# Patient Record
Sex: MALE | Race: WHITE | NOT HISPANIC OR LATINO | Employment: OTHER | URBAN - METROPOLITAN AREA
[De-identification: names, ages, dates, MRNs, and addresses within clinical notes are randomized per-mention and may not be internally consistent; named-entity substitution may affect disease eponyms.]

---

## 2018-06-28 ENCOUNTER — APPOINTMENT (EMERGENCY)
Dept: CT IMAGING | Facility: HOSPITAL | Age: 83
End: 2018-06-28
Payer: MEDICARE

## 2018-06-28 ENCOUNTER — HOSPITAL ENCOUNTER (EMERGENCY)
Facility: HOSPITAL | Age: 83
Discharge: HOME | End: 2018-06-28
Attending: EMERGENCY MEDICINE
Payer: MEDICARE

## 2018-06-28 VITALS
HEART RATE: 79 BPM | BODY MASS INDEX: 31.33 KG/M2 | OXYGEN SATURATION: 98 % | RESPIRATION RATE: 18 BRPM | DIASTOLIC BLOOD PRESSURE: 90 MMHG | SYSTOLIC BLOOD PRESSURE: 183 MMHG | WEIGHT: 182.54 LBS | TEMPERATURE: 97.5 F

## 2018-06-28 DIAGNOSIS — S01.91XA LACERATION OF HEAD: Primary | ICD-10-CM

## 2018-06-28 DIAGNOSIS — S09.90XA INJURY OF HEAD, INITIAL ENCOUNTER: ICD-10-CM

## 2018-06-28 DIAGNOSIS — R00.1 BRADYCARDIA: ICD-10-CM
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
## 2018-06-28 LAB
ANION GAP SERPL CALCULATED.3IONS-SCNC: 10 MMOL/L (ref 4–13)
BASOPHILS # BLD AUTO: 0.04 THOUSANDS/ΜL (ref 0–0.1)
BASOPHILS NFR BLD AUTO: 1 % (ref 0–1)
BUN SERPL-MCNC: 22 MG/DL (ref 5–25)
CALCIUM SERPL-MCNC: 9 MG/DL (ref 8.3–10.1)
CHLORIDE SERPL-SCNC: 102 MMOL/L (ref 100–108)
CO2 SERPL-SCNC: 24 MMOL/L (ref 21–32)
CREAT SERPL-MCNC: 1.73 MG/DL (ref 0.6–1.3)
EOSINOPHIL # BLD AUTO: 0.43 THOUSAND/ΜL (ref 0–0.61)
EOSINOPHIL NFR BLD AUTO: 8 % (ref 0–6)
ERYTHROCYTE [DISTWIDTH] IN BLOOD BY AUTOMATED COUNT: 13.4 % (ref 11.6–15.1)
GFR SERPL CREATININE-BSD FRML MDRD: 34 ML/MIN/1.73SQ M
GLUCOSE SERPL-MCNC: 120 MG/DL (ref 65–140)
HCT VFR BLD AUTO: 35.5 % (ref 36.5–49.3)
HGB BLD-MCNC: 12.1 G/DL (ref 12–17)
IMM GRANULOCYTES # BLD AUTO: 0.02 THOUSAND/UL (ref 0–0.2)
IMM GRANULOCYTES NFR BLD AUTO: 0 % (ref 0–2)
LYMPHOCYTES # BLD AUTO: 1.14 THOUSANDS/ΜL (ref 0.6–4.47)
LYMPHOCYTES NFR BLD AUTO: 21 % (ref 14–44)
MCH RBC QN AUTO: 32.6 PG (ref 26.8–34.3)
MCHC RBC AUTO-ENTMCNC: 34.1 G/DL (ref 31.4–37.4)
MCV RBC AUTO: 96 FL (ref 82–98)
MONOCYTES # BLD AUTO: 0.48 THOUSAND/ΜL (ref 0.17–1.22)
MONOCYTES NFR BLD AUTO: 9 % (ref 4–12)
NEUTROPHILS # BLD AUTO: 3.4 THOUSANDS/ΜL (ref 1.85–7.62)
NEUTS SEG NFR BLD AUTO: 61 % (ref 43–75)
NRBC BLD AUTO-RTO: 0 /100 WBCS
PLATELET # BLD AUTO: 197 THOUSANDS/UL (ref 149–390)
PMV BLD AUTO: 9.2 FL (ref 8.9–12.7)
POTASSIUM SERPL-SCNC: 4.8 MMOL/L (ref 3.5–5.3)
RBC # BLD AUTO: 3.71 MILLION/UL (ref 3.88–5.62)
SODIUM SERPL-SCNC: 136 MMOL/L (ref 136–145)
TROPONIN I SERPL-MCNC: <0.02 NG/ML
WBC # BLD AUTO: 5.51 THOUSAND/UL (ref 4.31–10.16)

## 2018-06-28 PROCEDURE — 85025 COMPLETE CBC W/AUTO DIFF WBC: CPT | Performed by: EMERGENCY MEDICINE

## 2018-06-28 PROCEDURE — 93005 ELECTROCARDIOGRAM TRACING: CPT

## 2018-06-28 PROCEDURE — 36415 COLL VENOUS BLD VENIPUNCTURE: CPT | Performed by: EMERGENCY MEDICINE

## 2018-06-28 PROCEDURE — 80048 BASIC METABOLIC PNL TOTAL CA: CPT | Performed by: EMERGENCY MEDICINE

## 2018-06-28 PROCEDURE — 70450 CT HEAD/BRAIN W/O DYE: CPT

## 2018-06-28 PROCEDURE — 99284 EMERGENCY DEPT VISIT MOD MDM: CPT

## 2018-06-28 PROCEDURE — 90715 TDAP VACCINE 7 YRS/> IM: CPT | Performed by: EMERGENCY MEDICINE

## 2018-06-28 PROCEDURE — 90471 IMMUNIZATION ADMIN: CPT

## 2018-06-28 PROCEDURE — 72125 CT NECK SPINE W/O DYE: CPT

## 2018-06-28 PROCEDURE — 84484 ASSAY OF TROPONIN QUANT: CPT | Performed by: EMERGENCY MEDICINE

## 2018-06-28 RX ORDER — LISINOPRIL 20 MG/1
10 TABLET ORAL DAILY
COMMUNITY
End: 2018-07-05

## 2018-06-28 RX ORDER — PANTOPRAZOLE SODIUM 40 MG/1
40 TABLET, DELAYED RELEASE ORAL DAILY
COMMUNITY
End: 2022-07-26 | Stop reason: SDUPTHER

## 2018-06-28 RX ORDER — SIMVASTATIN 40 MG
40 TABLET ORAL DAILY
COMMUNITY
End: 2022-07-26 | Stop reason: SDUPTHER

## 2018-06-28 RX ORDER — LIDOCAINE HYDROCHLORIDE AND EPINEPHRINE 10; 10 MG/ML; UG/ML
5 INJECTION, SOLUTION INFILTRATION; PERINEURAL ONCE
Status: COMPLETED | OUTPATIENT
Start: 2018-06-28 | End: 2018-06-28

## 2018-06-28 RX ORDER — GINSENG 100 MG
1 CAPSULE ORAL ONCE
Status: COMPLETED | OUTPATIENT
Start: 2018-06-28 | End: 2018-06-28

## 2018-06-28 RX ORDER — TAMSULOSIN HYDROCHLORIDE 0.4 MG/1
0.4 CAPSULE ORAL DAILY
COMMUNITY

## 2018-06-28 RX ADMIN — Medication 1 APPLICATION: at 04:53

## 2018-06-28 RX ADMIN — TETANUS TOXOID, REDUCED DIPHTHERIA TOXOID AND ACELLULAR PERTUSSIS VACCINE, ADSORBED 0.5 ML: 5; 2.5; 8; 8; 2.5 SUSPENSION INTRAMUSCULAR at 06:27

## 2018-06-28 RX ADMIN — LIDOCAINE HYDROCHLORIDE,EPINEPHRINE BITARTRATE 5 ML: 10; .01 INJECTION, SOLUTION INFILTRATION; PERINEURAL at 06:27

## 2018-06-28 RX ADMIN — BACITRACIN ZINC 1 LARGE APPLICATION: 500 OINTMENT TOPICAL at 06:28

## 2018-06-28 NOTE — ED NOTES
Patient roomed but unable to move in the computer due to registration     Betito Masterson RN  06/28/18 4990

## 2018-06-28 NOTE — ED PROVIDER NOTES
History  Chief Complaint   Patient presents with    Head Laceration     states "fell in bathroom and hit head on the sink   denies LOC takes 325 ASA daily   states "tripped trying to find the light switch while using the walker"     History of Present Illness   80 y o  male presents to the ED after fall at home  Patient affirms striking his head and denies any loss of consciousness  Patient states the fall occurred when going to the bathroom, he tripped on his walker when turning around to blank on the light  Patient currently complains of nothing other than bleeding from a laceration on his head  ROS: Patient denies any headache, abdominal pain, chest pain, extremity pain, fever/chills, nausea/vomiting, visual changes, diarrhea, dyspnea, cough, dysuria, hematuria  All other systems reviewed and are negative  PHYSICAL EXAM:   Primary Exam   A: Patent   B: Bilateral equal breath sounds   C: Pulses intact in all extremities, no active bleeding   D: No signs of gross motor or cognitive neurologic impairment     Secondary Exam   Constitutional: No acute distress  HENT: Normocephalic with right frontal laceration  Normal pharyngeal exam  No hemotympanum, raccoon eyes or Frazier sign  Eyes: No hyphema  EOMI  PERRL  Neck: No midline tenderness, supple  CV: Regular rate and rhythm, no murmur  Peripheral pulses intact  Respiratory: No traumatic findings  Lungs clear to auscultation bilaterally  Chest nontender  Abdomen: No traumatic findings  Soft, Non-tender, non-distended  Back: No vertebral tenderness, step-offs or crepitus  Skin: Normal color, warm and dry   Extremities: Non-tender, no deformities  Neuro: Awake, alert, no gross sensory or motor deficits     Medical Decision Making   66-year-old male presenting status post fall at home  Patient insists his fall is mechanical   Patient is on aspirin, no other anticoagulation or antiplatelet medications    Considering patient's age in history, will obtain CT imaging of patient's head as I cannot rule out intracranial pathology considering his obvious head trauma and his age  Considering patient's mechanism, will evaluate patient's cervical spine with CT imaging the patient has normal neurologic exam and does not have midline tenderness  As recent evidence is demonstrated nexus criteria is not a specific for elderly patients, CT imaging is appropriate  While in the room during initial assessment, patient had significant bradycardia into the upper 30s  Patient was not symptomatic during this episode  Patient was not on cardiac monitoring the time however was established shortly thereafter however patient had return to his normal baseline rate  As such in considering patient's history of prior CABG, will evaluate with cardiac evaluation  I suggested observation in the hospital considering patient was at home alone and I cannot rule out potential bradycardia was underlying cause of his potential fall, which patient has persistently declined  I discussed risks and benefits of this with the patient and will continue to monitor patient on the emergency room  Patient appears clear and competent of making medical decisions  Patient has skin flap from fall  Mild oozing from the area  Will place LET on wound, update patient's tetanus and surgically close  I have discussed that the edges appear devitalized and will likely lose some portion of this tissue  I cannot debride the edges sufficiently to allow appropiate wound closure so will use flap as biologic dressing for eventual healing by secondary intention  I have explained this to the patient, discussed continued follow up for suture removal and the management of the scar  Reassessment: no events while patient on cardiac monitoring  Again discussed my concerns with the patient  Workup otherwise unremarkable    Patient continue to decline admission to the hospital for continued cardiac monitoring  Insistent that fall was mechanical, no lightheadedness or syncope  Patient appears clear and competent of making medical decisions  Describes fall in detail  Offered alternatives and patient agreeable to return to the ER with any symptoms that we discussed of bradycardia  Ambulated without difficulty  Again went over wound care and follow up regarding laceration including suture removal   Discussed follow up regarding falls  Prior to Admission Medications   Prescriptions Last Dose Informant Patient Reported? Taking?   aspirin (ECOTRIN) 325 mg EC tablet   Yes Yes   Sig: Take 1 tablet by mouth daily   lisinopril (ZESTRIL) 20 mg tablet   Yes Yes   Sig: Take 10 mg by mouth daily   metoprolol tartrate (LOPRESSOR) 25 mg tablet  Self Yes Yes   Sig: Take 25 mg by mouth daily   pantoprazole (PROTONIX) 40 mg tablet   Yes Yes   Sig: Take 40 mg by mouth daily   simvastatin (ZOCOR) 40 mg tablet   Yes Yes   Sig: Take 40 mg by mouth daily   tamsulosin (FLOMAX) 0 4 mg   Yes Yes   Sig: Take 0 4 mg by mouth daily      Facility-Administered Medications: None       Past Medical History:   Diagnosis Date    Hyperlipidemia     Hypertension        Past Surgical History:   Procedure Laterality Date    BACK SURGERY      CARDIAC SURGERY      COLON SURGERY         History reviewed  No pertinent family history  I have reviewed and agree with the history as documented      Social History   Substance Use Topics    Smoking status: Current Some Day Smoker     Types: Cigars    Smokeless tobacco: Never Used    Alcohol use 6 0 oz/week     6 Glasses of wine, 4 Standard drinks or equivalent per week        Review of Systems    Physical Exam  Physical Exam    Vital Signs  ED Triage Vitals [06/28/18 0409]   Temperature Pulse Respirations Blood Pressure SpO2   97 5 °F (36 4 °C) 79 18 (!) 183/90 98 %      Temp Source Heart Rate Source Patient Position - Orthostatic VS BP Location FiO2 (%)   Oral Monitor Sitting Right arm --      Pain Score       4           Vitals:    06/28/18 0409   BP: (!) 183/90   Pulse: 79   Patient Position - Orthostatic VS: Sitting       Visual Acuity      ED Medications  Medications   LET gel 1 application (1 application Topical Given 6/28/18 0453)   lidocaine-epinephrine (XYLOCAINE/EPINEPHRINE) 1 %-1:100,000 injection 5 mL (5 mL Infiltration Given 6/28/18 1104)   tetanus-diphtheria-acellular pertussis (BOOSTRIX) IM injection 0 5 mL (0 5 mL Intramuscular Given 6/28/18 4047)   bacitracin topical ointment 1 large application (1 large application Topical Given 6/28/18 0628)       Diagnostic Studies  Results Reviewed     Procedure Component Value Units Date/Time    Troponin I [[id number]]  (Normal) Collected:  06/28/18 0453    Lab Status:  Final result Specimen:  Blood from Arm, Right Updated:  06/28/18 0518     Troponin I <0 02 ng/mL     Basic metabolic panel [[id number]]  (Abnormal) Collected:  06/28/18 0453    Lab Status:  Final result Specimen:  Blood from Arm, Right Updated:  06/28/18 0510     Sodium 136 mmol/L      Potassium 4 8 mmol/L      Chloride 102 mmol/L      CO2 24 mmol/L      Anion Gap 10 mmol/L      BUN 22 mg/dL      Creatinine 1 73 (H) mg/dL      Glucose 120 mg/dL      Calcium 9 0 mg/dL      eGFR 34 ml/min/1 73sq m     Narrative:         National Kidney Disease Education Program recommendations are as follows:  GFR calculation is accurate only with a steady state creatinine  Chronic Kidney disease less than 60 ml/min/1 73 sq  meters  Kidney failure less than 15 ml/min/1 73 sq  meters      CBC and differential [[id number]]  (Abnormal) Collected:  06/28/18 0453    Lab Status:  Final result Specimen:  Blood from Arm, Right Updated:  06/28/18 0459     WBC 5 51 Thousand/uL      RBC 3 71 (L) Million/uL      Hemoglobin 12 1 g/dL      Hematocrit 35 5 (L) %      MCV 96 fL      MCH 32 6 pg      MCHC 34 1 g/dL      RDW 13 4 %      MPV 9 2 fL      Platelets 631 Thousands/uL      nRBC 0 /100 WBCs Neutrophils Relative 61 %      Immat GRANS % 0 %      Lymphocytes Relative 21 %      Monocytes Relative 9 %      Eosinophils Relative 8 (H) %      Basophils Relative 1 %      Neutrophils Absolute 3 40 Thousands/µL      Immature Grans Absolute 0 02 Thousand/uL      Lymphocytes Absolute 1 14 Thousands/µL      Monocytes Absolute 0 48 Thousand/µL      Eosinophils Absolute 0 43 Thousand/µL      Basophils Absolute 0 04 Thousands/µL                  CT spine cervical without contrast   Final Result by Micki Zafar MD (06/28 5721)      No cervical spine fracture  Straightening of the normal cervical lordosis  Mild to moderate multilevel cervical degenerative changes and grade 1 anterolisthesis C3 on C4  Workstation performed: [id number]         CT head without contrast   Final Result by Micki Zafar MD (06/28 9725)      No acute intracranial process  No skull fracture  Chronic microangiopathy  Workstation performed: [id number]                    Procedures  Lac Repair  Date/Time: 7/9/2018 5:56 AM  Performed by: Camila Chaudhry  Authorized by: Camila Chaudhry   Consent: Verbal consent obtained  Risks and benefits: risks, benefits and alternatives were discussed  Consent given by: patient  Patient understanding: patient states understanding of the procedure being performed  Patient consent: the patient's understanding of the procedure matches consent given  Required items: required blood products, implants, devices, and special equipment available  Patient identity confirmed: verbally with patient  Time out: Immediately prior to procedure a "time out" was called to verify the correct patient, procedure, equipment, support staff and site/side marked as required    Body area: head/neck  Location details: scalp  Laceration length: 3 cm  Anesthesia: local infiltration    Anesthesia:  Local Anesthetic: lidocaine 1% with epinephrine and LET (lido,epi,tetracaine)  Anesthetic total: 3 mL    Wound Dehiscence:  Superficial Wound Dehiscence: simple closure      Procedure Details:  Preparation: Patient was prepped and draped in the usual sterile fashion  Irrigation solution: saline  Amount of cleaning: standard  Skin closure: 5-0 nylon  Number of sutures: 4  Technique: simple  Approximation: loose  Approximation difficulty: simple  Dressing: antibiotic ointment  Patient tolerance: Patient tolerated the procedure well with no immediate complications             Phone Contacts  ED Phone Contact    ED Course  ED Course as of Jun 28 1623   Thu Jun 28, 2018   0515 EKG demonstrates sinus rhythm with left bundle branch block  There is no old to compare against   Negative Sgarbossa's  Patient not having active chest pain  MDM  CritCare Time    Disposition  Final diagnoses:   Laceration of head   Injury of head, initial encounter   Fall, initial encounter   Bradycardia     Time reflects when diagnosis was documented in both MDM as applicable and the Disposition within this note     Time User Action Codes Description Comment    6/28/2018  6:29 AM Eugene Lemon Tricia Julian Laceration of head     6/28/2018  6:29 AM Eugene Alvarez Add [U80 43MK] Injury of head, initial encounter     6/28/2018  6:30 AM Eugene Alvarez Add [F63  KXOY] Fall, initial encounter     6/28/2018  6:30 AM Eugene Alvarez Add [R00 1] Bradycardia       ED Disposition     ED Disposition Condition Comment    Discharge  Karley Heritage discharge to home/self care      Condition at discharge: Stable        Follow-up Information     Follow up With Specialties Details Why Contact Info Additional [facility] Emergency Department Emergency Medicine In 1 week For suture removal or follow with PCP for removal  [address] [place] [id number]  [phone] [place] ED, [address] [place], [place],  Arjun Game[facility], DO Internal Medicine  Alternative follow-up for suture removal  [address] [facility] 7344 Emergency Department Emergency Medicine Go to If symptoms worsen or any lightheadedness or other symptoms  [facility] 7481 ED, [address] [place], [place], [address]          Discharge Medication List as of 6/28/2018  6:31 AM      CONTINUE these medications which have NOT CHANGED    Details   aspirin (ECOTRIN) 325 mg EC tablet Take 1 tablet by mouth daily, Historical Med      lisinopril (ZESTRIL) 20 mg tablet Take 10 mg by mouth daily, Historical Med      metoprolol tartrate (LOPRESSOR) 25 mg tablet Take 25 mg by mouth daily, Historical Med      pantoprazole (PROTONIX) 40 mg tablet Take 40 mg by mouth daily, Historical Med      simvastatin (ZOCOR) 40 mg tablet Take 40 mg by mouth daily, Historical Med      tamsulosin (FLOMAX) 0 4 mg Take 0 4 mg by mouth daily, Historical Med           No discharge procedures on file      ED Provider  Electronically Signed by           Keke Devlin MD  06/28/18 [address] [facility] Nesha Arellano MD  07/09/18 1484

## 2018-06-28 NOTE — DISCHARGE INSTRUCTIONS
Head Injury   WHAT YOU NEED TO KNOW:   A head injury is most often caused by a blow to the head  This may occur from a fall, bicycle injury, sports injury, being struck in the head, or a motor vehicle accident  DISCHARGE INSTRUCTIONS:   Call 911 or have someone else call for any of the following:   · You cannot be woken  · You have a seizure  · You stop responding to others or you faint  · You have blurry or double vision  · Your speech becomes slurred or confused  · You have arm or leg weakness, loss of feeling, or new problems with coordination  · Your pupils are larger than usual or one pupil is a different size than the other  · You have blood or clear fluid coming out of your ears or nose  Return to the emergency department if:   · You have repeated or forceful vomiting  · You feel confused  · Your headache gets worse or becomes severe  · You or someone caring for you notices that you are harder to wake than usual   Contact your healthcare provider if:   · Your symptoms last longer than 6 weeks after the injury  · You have questions or concerns about your condition or care  Medicines:   · Acetaminophen  decreases pain  Acetaminophen is available without a doctor's order  Ask how much to take and how often to take it  Follow directions  Acetaminophen can cause liver damage if not taken correctly  · Take your medicine as directed  Contact your healthcare provider if you think your medicine is not helping or if you have side effects  Tell him or her if you are allergic to any medicine  Keep a list of the medicines, vitamins, and herbs you take  Include the amounts, and when and why you take them  Bring the list or the pill bottles to follow-up visits  Carry your medicine list with you in case of an emergency  Self-care:   · Rest  or do quiet activities for 24 to 48 hours  Limit your time watching TV, using the computer, or doing tasks that require a lot of thinking  Slowly return to your normal activities as directed  Do not play sports or do activities that may cause you to get hit in the head  Ask your healthcare provider when you can return to sports  · Apply ice  on your head for 15 to 20 minutes every hour or as directed  Use an ice pack, or put crushed ice in a plastic bag  Cover it with a towel before you apply it to your skin  Ice helps prevent tissue damage and decreases swelling and pain  · Have someone stay with you for 24 hours  or as directed  This person can monitor you for complications and call [phone]  When you are awake the person should ask you a few questions to see if you are thinking clearly  An example would be to ask your name or your address  Prevent another head injury:   · Wear a helmet that fits properly  Do this when you play sports, or ride a bike, scooter, or skateboard  Helmets help decrease your risk of a serious head injury  Talk to your healthcare provider about other ways you can protect yourself if you play sports  · Wear your seat belt every time you are in a car  This helps to decrease your risk for a head injury if you are in a car accident  Follow up with your healthcare provider as directed:  Write down your questions so you remember to ask them during your visits  © 2017 [address] [employer] Information is for End User's use only and may not be sold, redistributed or otherwise used for commercial purposes  All illustrations and images included in CareNotes® are the copyrighted property of [employer] M [employer]  or [employer]  The above information is an  only  It is not intended as medical advice for individual conditions or treatments  Talk to your doctor, nurse or pharmacist before following any medical regimen to see if it is safe and effective for you  Laceration   WHAT YOU NEED TO KNOW:   A laceration is an injury to the skin and the soft tissue underneath it   Lacerations happen when you are cut or hit by something  They can happen anywhere on the body  DISCHARGE INSTRUCTIONS:   Return to the emergency department if:   · You have heavy bleeding or bleeding that does not stop after 10 minutes of holding firm, direct pressure over the wound  · Your wound opens up  Contact your healthcare provider if:   · You have a fever or chills  · Your laceration is red, warm, or swollen  · You have red streaks on your skin coming from your wound  · You have white or yellow drainage from the wound that smells bad  · You have pain that gets worse, even after treatment  · You have questions or concerns about your condition or care  Medicines:   · Prescription pain medicine  may be given  Ask how to take this medicine safely  · Antibiotics  help treat or prevent a bacterial infection  · Take your medicine as directed  Contact your healthcare provider if you think your medicine is not helping or if you have side effects  Tell him or her if you are allergic to any medicine  Keep a list of the medicines, vitamins, and herbs you take  Include the amounts, and when and why you take them  Bring the list or the pill bottles to follow-up visits  Carry your medicine list with you in case of an emergency  Care for your wound as directed:   · Do not get your wound wet  until your healthcare provider says it is okay  Do not soak your wound in water  Do not go swimming until your healthcare provider says it is okay  Carefully wash the wound with soap and water  Gently pat the area dry or allow it to air dry  · Change your bandages  when they get wet, dirty, or after washing  Apply new, clean bandages as directed  Do not apply elastic bandages or tape too tight  Do not put powders or lotions over your incision  · Apply antibiotic ointment as directed  Your healthcare provider may give you antibiotic ointment to put over your wound if you have stitches   If you have strips of tape over your incision, let them dry up and fall off on their own  If they do not fall off within 14 days, gently remove them  If you have glue over your wound, do not remove or pick at it  If your glue comes off, do not replace it with glue that you have at home  · Check your wound every day for signs of infection such as swelling, redness, or pus  Self-care:   · Apply ice  on your wound for 15 to 20 minutes every hour or as directed  Use an ice pack, or put crushed ice in a plastic bag  Cover it with a towel  Ice helps prevent tissue damage and decreases swelling and pain  · Use a splint as directed  A splint will decrease movement and stress on your wound  It may help it heal faster  A splint may be used for lacerations over joints or areas of your body that bend  Ask your healthcare provider how to apply and remove a splint  · Decrease scarring of your wound  by applying ointments as directed  Do not apply ointments until your healthcare provider says it is okay  You may need to wait until your wound is healed  Ask which ointment to buy and how often to use it  After your wound is healed, use sunscreen over the area when you are out in the sun  You should do this for at least 6 months to 1 year after your injury  Follow up with your healthcare provider as directed: You may need to follow up in 24 to 48 hours to have your wound checked for infection  You will need to return in 3 to 14 days if you have stitches or staples so they can be removed  Care for your wound as directed to prevent infection and help it heal  Write down your questions so you remember to ask them during your visits  © 2017 [employer] Information is for End User's use only and may not be sold, redistributed or otherwise used for commercial purposes  All illustrations and images included in CareNotes® are the copyrighted property of [employer]  or [employer]  The above information is an  only  It is not intended as medical advice for individual conditions or treatments  Talk to your doctor, nurse or pharmacist before following any medical regimen to see if it is safe and effective for you

## 2018-06-29 LAB
ATRIAL RATE: 60 BPM
P AXIS: -35 DEGREES
PR INTERVAL: 174 MS
QRS AXIS: -4 DEGREES
QRSD INTERVAL: 162 MS
QT INTERVAL: 440 MS
QTC INTERVAL: 464 MS
T WAVE AXIS: 101 DEGREES
VENTRICULAR RATE: 67 BPM

## 2018-06-29 PROCEDURE — 93010 ELECTROCARDIOGRAM REPORT: CPT | Performed by: INTERNAL MEDICINE

## 2018-07-03 RX ORDER — FLUOROURACIL 50 MG/G
CREAM TOPICAL 2 TIMES DAILY
COMMUNITY
Start: 2016-12-12 | End: 2020-01-06 | Stop reason: CLARIF

## 2018-07-03 RX ORDER — RIBOFLAVIN (VITAMIN B2) 100 MG
TABLET ORAL AS NEEDED
COMMUNITY
End: 2020-01-06 | Stop reason: CLARIF

## 2018-07-05 ENCOUNTER — OFFICE VISIT (OUTPATIENT)
Dept: INTERNAL MEDICINE CLINIC | Facility: CLINIC | Age: 83
End: 2018-07-05
Payer: MEDICARE

## 2018-07-05 VITALS
DIASTOLIC BLOOD PRESSURE: 80 MMHG | OXYGEN SATURATION: 96 % | BODY MASS INDEX: 31 KG/M2 | HEART RATE: 76 BPM | HEIGHT: 64 IN | WEIGHT: 181.6 LBS | SYSTOLIC BLOOD PRESSURE: 130 MMHG

## 2018-07-05 DIAGNOSIS — S01.01XD LACERATION OF SCALP, SUBSEQUENT ENCOUNTER: Primary | ICD-10-CM

## 2018-07-05 DIAGNOSIS — Z13.31 DEPRESSION SCREENING NEGATIVE: ICD-10-CM

## 2018-07-05 PROCEDURE — 99213 OFFICE O/P EST LOW 20 MIN: CPT | Performed by: INTERNAL MEDICINE

## 2018-07-05 RX ORDER — LISINOPRIL 10 MG/1
10 TABLET ORAL DAILY
COMMUNITY
Start: 2018-05-30 | End: 2021-06-24 | Stop reason: SINTOL

## 2018-07-05 NOTE — PROGRESS NOTES
INTERNAL MEDICINE FOLLOW-UP OFFICE VISIT  [facility]    NAME: Oneta Boas  AGE: 80 y o  SEX: male  : 1927     DATE: 2018     Assessment and Plan:     1  Laceration of scalp, subsequent encounter    Wound is healing well  Continue use of antibiotic ointment  Sutures were removed without complication in the office today  Discussed with patient to follow-up if develops any new or worsening symptoms  Chief Complaint:     Chief Complaint   Patient presents with    Suture / Staple Removal     right forehead      History of Present Illness:     Patient presents for ER follow-up for suture removal  Sustained right scalp laceration after mechanical fall  He had 4 sutures placed and was ultimately discharged from the ER and told to follow-up for suture removal  He still follows mainly with PCP in [address] [place] [address]  He may eventually switch to our practice fully  No LOC  No headaches, nausea, vomiting, difficulty sleeping  Feels embarrassed that he fell  The following portions of the patient's history were reviewed and updated as appropriate: allergies, current medications, past family history, past medical history, past social history, past surgical history and problem list      Review of Systems:     Review of Systems   Constitutional: Negative for diaphoresis, fatigue and fever  Respiratory: Negative for shortness of breath  Cardiovascular: Negative for chest pain, palpitations and leg swelling  Gastrointestinal: Negative for abdominal distention, abdominal pain, constipation, diarrhea and nausea  Skin: Positive for wound  Negative for rash  Neurological: Negative for dizziness, seizures, weakness, light-headedness and headaches  Psychiatric/Behavioral: Negative for agitation, sleep disturbance and suicidal ideas  The patient is not nervous/anxious         Problem List:     Patient Active Problem List   Diagnosis    Hypertension    Hyperlipidemia  CAD (coronary artery disease)    Benign prostatic hyperplasia    Actinic keratosis      Objective:     /80   Pulse 76   Ht 5' 3 75" (1 619 m)   Wt 82 4 kg (181 lb 9 6 oz)   SpO2 96%   BMI 31 42 kg/m²       Physical Exam   Constitutional: He appears well-developed and well-nourished  No distress  Cardiovascular: Normal rate and regular rhythm  Pulmonary/Chest: Effort normal and breath sounds normal    Abdominal: Soft  Bowel sounds are normal  There is no tenderness  Skin: Skin is warm and dry  No rash noted  He is not diaphoretic  No erythema  Right scalp healing wound without any surrounding erythema or purulent drainage     Suture removal  Date/Time: 7/5/2018 2:18 PM  Performed by: Domenica Gibson  Authorized by: Domenica Gibson     Consent:     Consent obtained:  Verbal    Consent given by:  Patient    Risks discussed:  Pain and wound separation    Alternatives discussed:  No treatment  Universal protocol:     Procedure explained and questions answered to patient or proxy's satisfaction: yes      Patient identity confirmed:  Verbally with patient  Location:     Laterality:  Right    Location: Scalp  Procedure details: Tools used:  Suture removal kit    Wound appearance:  No sign(s) of infection, good wound healing and clean    Number of sutures removed:  4  Post-procedure details:     Post-removal:  Antibiotic ointment applied    Patient tolerance of procedure: Tolerated well, no immediate complications      Pertinent Laboratory/Diagnostic Studies:    Laboratory Results: I have personally reviewed the pertinent laboratory results/reports     Radiology/Other Diagnostic Testing Results: I have personally reviewed pertinent reports  PHQ-9  Negative for depression with PHQ2 score of 0  Fall Risk  The patient has a history of falls  A risk assessment for falls was completed  A plan of care for falls was documented       Current Medications:     Outpatient Medications Prior to Visit   Medication Sig Dispense Refill    aspirin (ECOTRIN) 325 mg EC tablet Take 1 tablet by mouth daily      metoprolol tartrate (LOPRESSOR) 25 mg tablet Take 25 mg by mouth daily      pantoprazole (PROTONIX) 40 mg tablet Take 40 mg by mouth daily      simvastatin (ZOCOR) 40 mg tablet Take 40 mg by mouth daily      tamsulosin (FLOMAX) 0 4 mg Take 0 4 mg by mouth daily      lisinopril (ZESTRIL) 20 mg tablet Take 10 mg by mouth daily       No facility-administered medications prior to visit          Adelia Winston DO  [facility]

## 2018-07-05 NOTE — PATIENT INSTRUCTIONS
Fall Prevention   AMBULATORY CARE:   Fall prevention  includes ways to make your home and other areas safer  It also includes ways you can move more carefully to prevent a fall  Health conditions that cause changes in your blood pressure, vision, or muscle strength and coordination may increase your risk for falls  Medicines may also increase your risk for falls if they make you dizzy, weak, or sleepy  Call 911 or have someone else call if:   · You have fallen and are unconscious  · You have fallen and cannot move part of your body  Contact your healthcare provider if:   · You have fallen and have pain or a headache  · You have questions or concerns about your condition or care  Fall prevention tips:   · Stand or sit up slowly  This may help you keep your balance and prevent falls  · Use assistive devices as directed  Your healthcare provider may suggest that you use a cane or walker to help you keep your balance  You may need to have grab bars put in your bathroom near the toilet or in the shower  · Wear shoes that fit well and have soles that   Wear shoes both inside and outside  Use slippers with good   Do not wear shoes with high heels  · Wear a personal alarm  This is a device that allows you to call 911 if you fall and need help  Ask your healthcare provider for more information  · Stay active  Exercise can help strengthen your muscles and improve your balance  Your healthcare provider may recommend water aerobics or walking  He or she may also recommend physical therapy to improve your coordination  Never start an exercise program without talking to your healthcare provider first      · Manage your medical conditions  Keep all appointments with your healthcare providers  Visit your eye doctor as directed  Home safety tips:   · Add items to prevent falls in the bathroom  Put nonslip strips on your bath or shower floor to prevent you from slipping   Use a bath mat if you do not have carpet in the bathroom  This will prevent you from falling when you step out of the bath or shower  Use a shower seat so you do not need to stand while you shower  Sit on the toilet or a chair in your bathroom to dry yourself and put on clothing  This will prevent you from losing your balance from drying or dressing yourself while you are standing  · Keep paths clear  Remove books, shoes, and other objects from walkways and stairs  Place cords for telephones and lamps out of the way so that you do not need to walk over them  Tape them down if you cannot move them  Remove small rugs  If you cannot remove a rug, secure it with double-sided tape  This will prevent you from tripping  · Install bright lights in your home  Use night lights to help light paths to the bathroom or kitchen  Always turn on the light before you start walking  · Keep items you use often on shelves within reach  Do not use a step stool to help you reach an item  · Paint or place reflective tape on the edges of your stairs  This will help you see the stairs better  Follow up with your healthcare provider as directed:  Write down your questions so you remember to ask them during your visits  © 2017 [employer] Information is for End User's use only and may not be sold, redistributed or otherwise used for commercial purposes  All illustrations and images included in CareNotes® are the copyrighted property of [employer]  or [employer]  The above information is an  only  It is not intended as medical advice for individual conditions or treatments  Talk to your doctor, nurse or pharmacist before following any medical regimen to see if it is safe and effective for you

## 2018-10-18 RX ORDER — CIPROFLOXACIN 250 MG/1
250 TABLET, FILM COATED ORAL 2 TIMES DAILY
Refills: 0 | COMMUNITY
Start: 2018-09-28 | End: 2018-10-19 | Stop reason: CLARIF

## 2018-10-18 RX ORDER — NEOMYCIN SULFATE, POLYMYXIN B SULFATE AND HYDROCORTISONE 10; 3.5; 1 MG/ML; MG/ML; [USP'U]/ML
SUSPENSION/ DROPS AURICULAR (OTIC)
Refills: 2 | COMMUNITY
Start: 2018-09-28 | End: 2019-10-17 | Stop reason: CLARIF

## 2018-10-19 ENCOUNTER — OFFICE VISIT (OUTPATIENT)
Dept: INTERNAL MEDICINE CLINIC | Facility: CLINIC | Age: 83
End: 2018-10-19
Payer: MEDICARE

## 2018-10-19 VITALS
BODY MASS INDEX: 30.97 KG/M2 | WEIGHT: 181.4 LBS | DIASTOLIC BLOOD PRESSURE: 78 MMHG | HEIGHT: 64 IN | SYSTOLIC BLOOD PRESSURE: 138 MMHG | HEART RATE: 77 BPM

## 2018-10-19 DIAGNOSIS — M19.032 PRIMARY OSTEOARTHRITIS OF LEFT WRIST: Primary | ICD-10-CM

## 2018-10-19 DIAGNOSIS — Z23 ENCOUNTER FOR IMMUNIZATION: ICD-10-CM

## 2018-10-19 PROCEDURE — G0009 ADMIN PNEUMOCOCCAL VACCINE: HCPCS | Performed by: INTERNAL MEDICINE

## 2018-10-19 PROCEDURE — 90662 IIV NO PRSV INCREASED AG IM: CPT | Performed by: INTERNAL MEDICINE

## 2018-10-19 PROCEDURE — 99213 OFFICE O/P EST LOW 20 MIN: CPT | Performed by: INTERNAL MEDICINE

## 2018-10-19 PROCEDURE — 90670 PCV13 VACCINE IM: CPT | Performed by: INTERNAL MEDICINE

## 2018-10-19 PROCEDURE — G0008 ADMIN INFLUENZA VIRUS VAC: HCPCS | Performed by: INTERNAL MEDICINE

## 2018-10-19 RX ORDER — METOPROLOL SUCCINATE 25 MG
25 TABLET, EXTENDED RELEASE 24 HR ORAL DAILY
COMMUNITY
Start: 2018-09-01 | End: 2022-04-19 | Stop reason: SDUPTHER

## 2018-10-19 NOTE — PATIENT INSTRUCTIONS
Carpal Tunnel Surgery   WHAT YOU NEED TO KNOW:   What do I need to know about carpal tunnel surgery? Carpal tunnel surgery, or decompression, is used to take pressure off the median nerve in your wrist  The median nerve controls muscles and feeling in the hand  Surgery may be done through an opening on your palm  This is called open surgery  Your surgeon may instead put a scope and tools into 1 or 2 small incisions on your wrist or palm  This is called endoscopic surgery  How do I prepare for surgery? Your healthcare provider will tell you how to prepare for surgery  He may tell you not to eat or drink anything after midnight on the day of your surgery  He will tell you which medicines to take or not take on the day of surgery  Arrange to have someone drive you home after surgery  You may get antibiotics before surgery to prevent an infection  Tell your healthcare provider if you have ever had an allergic reaction to antibiotics  What will happen during surgery? · You may be given local or regional anesthesia to help prevent pain during surgery  Local anesthesia will make only your wrist numb  Regional anesthesia will make your wrist, hand, and arm numb  You may instead be given general anesthesia to keep you asleep and free from pain  You may need this anesthesia if your surgeon thinks surgery will take a long time or involve a large part of your wrist      · For open surgery, your surgeon will make an incision on the palm of your hand  The incision may extend to your wrist  A ligament will be cut to release the pressure on the nerve  This ligament is a band of tissue that connects joints in your wrist  Your surgeon may also remove scar tissue or anything else that may be pressing on the nerve  · For endoscopic surgery, your surgeon will make 1 or 2 incisions on your wrist or palm  He will insert the endoscope with the camera through an incision to help guide him during surgery   Tools may be put in your wrist to help protect the nerves  He will then cut the ligament that is pressing on the nerve  · The incision will be closed with stitches and covered with bandages  What will happen after surgery? A splint may be placed on your wrist to keep it from moving  You will be taken to a room where you will rest until you are fully awake and gain feeling in your arm  Your healthcare provider may ask you to move your fingers soon after your surgery  Do not try to get out of bed until your healthcare provider says it is okay  What are the risks of carpal tunnel surgery? You may bleed more than expected or get an infection  Your skin may bruise  A thick, painful scar may form where you had surgery  You may develop trigger finger (fingers locked in a bent position)  Surgery may cause long-term numbness or weakness in your fingers, hand, or wrist  Your symptoms may not go away, and you may need surgery again  CARE AGREEMENT:   You have the right to help plan your care  Learn about your health condition and how it may be treated  Discuss treatment options with your caregivers to decide what care you want to receive  You always have the right to refuse treatment  The above information is an  only  It is not intended as medical advice for individual conditions or treatments  Talk to your doctor, nurse or pharmacist before following any medical regimen to see if it is safe and effective for you  © 2017 [employer] Information is for End User's use only and may not be sold, redistributed or otherwise used for commercial purposes  All illustrations and images included in CareNotes® are the copyrighted property of [employer]  or [employer]

## 2018-10-19 NOTE — PROGRESS NOTES
INTERNAL MEDICINE FOLLOW-UP OFFICE VISIT  [facility]    NAME: Alissa Reyes  AGE: 80 y o  SEX: male  : 1927     DATE: 10/19/2018     Assessment and Plan:     1  Primary osteoarthritis of left wrist    Patient was reassured that this is likely arthritis pain he is experiencing  He states the pain is mild and that he can tolerate it  He will work on positioning himself better when he goes to sleep  No further testing indicated  2  Encounter for immunization  - PNEUMOCOCCAL CONJUGATE VACCINE 13-VALENT GREATER THAN 6 MONTHS  - influenza vaccine, [id number], high-dose, PF 0 5 mL, for patients 65 yr+ (FLUZONE HIGH-DOSE)    Return in about 4 months (around 2019) for AWV, Follow-up  Chief Complaint:     Chief Complaint   Patient presents with    Hand Pain     at night      History of Present Illness:     Gets intermittent pain in hands at times  Only bothers him at night though  He is a side sleeper  He will get pain more around digits 1-3  He denies any chest pain, shortness of breath, palpitations, diaphoresis  He denies any loss of  strength  Doesn't feel any numbness  More of a dull pain with some sharp moments  Has some arthritis topical ointment he uses at that takes it away within 30 minutes  The following portions of the patient's history were reviewed and updated as appropriate: allergies, current medications, past family history, past medical history, past social history, past surgical history and problem list      Review of Systems:     Review of Systems   Respiratory: Negative  Cardiovascular: Negative  Musculoskeletal: Positive for arthralgias  Neurological: Negative for weakness and numbness        Problem List:     Patient Active Problem List   Diagnosis    Hypertension    Hyperlipidemia    CAD (coronary artery disease)    Benign prostatic hyperplasia    Actinic keratosis      Objective:     /78 (BP Location: Left arm, Patient Position: Sitting, Cuff Size: Standard)   Pulse 77   Ht 5' 3 75" (1 619 m)   Wt 82 3 kg (181 lb 6 4 oz)   BMI 31 38 kg/m²     Physical Exam   Constitutional: He appears well-developed and well-nourished  No distress  Cardiovascular: Normal rate and regular rhythm  No murmur heard  Pulmonary/Chest: Effort normal and breath sounds normal  No respiratory distress  Musculoskeletal: He exhibits deformity (Arthritic changes of left hand; reduced ROM; negative Tinel sign; negative Phalen's )  Skin: He is not diaphoretic         Susie DO Issa  [facility]

## 2018-11-29 ENCOUNTER — TELEPHONE (OUTPATIENT)
Dept: INTERNAL MEDICINE CLINIC | Facility: CLINIC | Age: 83
End: 2018-11-29

## 2019-10-16 ENCOUNTER — OFFICE VISIT (OUTPATIENT)
Dept: DERMATOLOGY | Facility: CLINIC | Age: 84
End: 2019-10-16
Payer: MEDICARE

## 2019-10-16 DIAGNOSIS — L82.1 SEBORRHEIC KERATOSIS: ICD-10-CM

## 2019-10-16 DIAGNOSIS — L57.0 ACTINIC KERATOSIS: Primary | ICD-10-CM

## 2019-10-16 DIAGNOSIS — Z13.89 SCREENING FOR SKIN CONDITION: ICD-10-CM

## 2019-10-16 PROCEDURE — 99203 OFFICE O/P NEW LOW 30 MIN: CPT | Performed by: DERMATOLOGY

## 2019-10-16 NOTE — PROGRESS NOTES
[address]  [address] [facility] [address]  [phone]  [phone]     MRN: [id number] : 1927  Encounter: [id number]  Patient Information: Patrick Lowe  Chief complaint:  Concerned regarding rash on arms    History of present illness:  80-year-old male presents secondary to concerns with a rash on his arms that he states has only been present recently  Patient has listed in his chart use of Efudex which he received in  but he does not recall using that medication  Patient does not know if he saw a dermatologist previously  Patient last seen by Dr Ernesto Graves a year ago   Patient was seen by cardiologist last in May and has not been seen by any other Dr   Past Medical History:   Diagnosis Date    Esophageal reflux     Hyperlipidemia     Hypertension      Past Surgical History:   Procedure Laterality Date    BACK SURGERY      CARDIAC SURGERY      COLON SURGERY      CORONARY ARTERY BYPASS GRAFT      TONSILLECTOMY      Last Assessed: 2016     Social History   Social History     Substance and Sexual Activity   Alcohol Use Yes    Alcohol/week: 10 0 standard drinks    Types: 6 Glasses of wine, 4 Standard drinks or equivalent per week    Frequency: Monthly or less    Drinks per session: 1 or 2    Binge frequency: Never    Comment: occasionally     Social History     Substance and Sexual Activity   Drug Use No     Social History     Tobacco Use   Smoking Status Current Some Day Smoker    Packs/day: 0 00    Types: Cigars   Smokeless Tobacco Never Used     Family History   Problem Relation Age of Onset    Cancer Mother     Stroke Father      Meds/Allergies   Allergies   Allergen Reactions    Penicillins Rash     Rash on hands    Morphine Other (See Comments)     Low blood pressure       Meds:  Prior to Admission medications    Medication Sig Start Date End Date Taking?  Authorizing Provider   Ascorbic Acid (VITAMIN C) 100 MG tablet Take by mouth as needed     Yes Historical Provider, MD   aspirin (ECOTRIN) 325 mg EC tablet Take 1 tablet by mouth daily   Yes Historical Provider, MD   lisinopril (ZESTRIL) 10 mg tablet Take 10 mg by mouth daily 5/30/18  Yes Historical Provider, MD   pantoprazole (PROTONIX) 40 mg tablet Take 40 mg by mouth daily   Yes Historical Provider, MD   simvastatin (ZOCOR) 40 mg tablet Take 40 mg by mouth daily   Yes Historical Provider, MD   TOPROL XL 25 MG 24 hr tablet  9/1/18  Yes Historical Provider, MD   fluorouracil (EFUDEX) 5 % cream Apply topically 2 (two) times a day 12/12/16   Historical Provider, MD   neomycin-polymyxin-hydrocortisone (CORTISPORIN) 0 35%-10,000 units/mL-1% otic suspension PUT 5 DROPS IN LEFT EAR TWICE A DAY AS NEEDED 9/28/18   Historical Provider, MD   Polyethylene Glycol 3350 (MIRALAX PO) Take by mouth as needed      Historical Provider, MD   tamsulosin (FLOMAX) 0 4 mg Take 0 4 mg by mouth daily    Historical Provider, MD       Subjective:     Review of Systems:    General: negative for - chills, fatigue, fever,  weight gain or weight loss  Psychological: negative for - anxiety, behavioral disorder, concentration difficulties, decreased libido, depression, irritability, memory difficulties, mood swings, sleep disturbances or suicidal ideation  ENT: negative for - hearing difficulties , nasal congestion, nasal discharge, oral lesions, sinus pain, sneezing, sore throat  Allergy and Immunology: negative for - hives, insect bite sensitivity,  Hematological and Lymphatic: negative for - bleeding problems, blood clots,bruising, swollen lymph nodes  Endocrine: negative for - hair pattern changes, hot flashes, malaise/lethargy, mood swings, palpitations, polydipsia/polyuria, skin changes, temperature intolerance or unexpected weight change  Respiratory: negative for - cough, hemoptysis, orthopnea, shortness of breath, or wheezing  Cardiovascular: negative for - chest pain, dyspnea on exertion, edema,  Gastrointestinal: negative for - abdominal pain, nausea/vomiting  Genito-Urinary: negative for - dysuria, incontinence, irregular/heavy menses or urinary frequency/urgency  Musculoskeletal: negative for - gait disturbance, joint pain, joint stiffness, joint swelling, muscle pain, muscular weakness  Dermatological:  As in HPI  Neurological: negative for confusion, dizziness, headaches, impaired coordination/balance, memory loss, numbness/tingling, seizures, speech problems, tremors or weakness       Objective: There were no vitals taken for this visit  Physical Exam:    General Appearance:    Alert, cooperative, no distress   Head:    Normocephalic, without obvious abnormality, atraumatic           Skin:   A full skin exam was performed including scalp, head scalp, eyes, ears, nose, lips, neck, chest, axilla, abdomen, back, buttocks, bilateral upper extremities, bilateral lower extremities, hands, feet, fingers, toes, fingernails, and toenails wide areas of scaling erythema on the dorsum of the arms also on the scalp     Assessment:     1  Actinic keratosis     2  Seborrheic keratosis     3  Screening for skin condition           Plan:   Actinic keratosis quite extensive patient however really seems somewhat confused and would difficult to delineate if what he has treated with this process previously at present would hold off on any intervention until he gets further evaluated will treat with topical steroid for now just a control irritation and re-evaluate again in a month  Seborrheic Keratosis  Patient reasurred these are normal growths we acquire with age no treatment needed    Screening for Dermatologic Disorders: Nothing else of concern noted on complete exam follow up in 1 month      Meet Goss MD  10/16/2019,10:47 AM    Portions of the record may have been created with voice recognition software   Occasional wrong word or "sound a like" substitutions may have occurred due to the inherent limitations of voice recognition software   Read the chart carefully and recognize, using context, where substitutions have occurred

## 2019-10-16 NOTE — PATIENT INSTRUCTIONS
Actinic keratosis quite extensive patient however really seems somewhat confused and would difficult to delineate if what he has treated with this process previously at present would hold off on any intervention until he gets further evaluated will treat with topical steroid for now just a control irritation and re-evaluate again in a month  Seborrheic Keratosis  Patient reasurred these are normal growths we acquire with age no treatment needed    Screening for Dermatologic Disorders: Nothing else of concern noted on complete exam follow up in 1 month

## 2019-10-17 ENCOUNTER — OFFICE VISIT (OUTPATIENT)
Dept: INTERNAL MEDICINE CLINIC | Facility: CLINIC | Age: 84
End: 2019-10-17
Payer: MEDICARE

## 2019-10-17 VITALS
WEIGHT: 181.4 LBS | OXYGEN SATURATION: 98 % | DIASTOLIC BLOOD PRESSURE: 70 MMHG | HEART RATE: 82 BPM | BODY MASS INDEX: 30.97 KG/M2 | HEIGHT: 64 IN | SYSTOLIC BLOOD PRESSURE: 130 MMHG

## 2019-10-17 DIAGNOSIS — M19.042 PRIMARY OSTEOARTHRITIS OF BOTH HANDS: Primary | ICD-10-CM

## 2019-10-17 DIAGNOSIS — M19.041 PRIMARY OSTEOARTHRITIS OF BOTH HANDS: Primary | ICD-10-CM

## 2019-10-17 DIAGNOSIS — I25.10 CORONARY ARTERY DISEASE INVOLVING NATIVE CORONARY ARTERY OF NATIVE HEART WITHOUT ANGINA PECTORIS: ICD-10-CM

## 2019-10-17 DIAGNOSIS — I10 ESSENTIAL HYPERTENSION: ICD-10-CM

## 2019-10-17 DIAGNOSIS — G56.01 CARPAL TUNNEL SYNDROME OF RIGHT WRIST: ICD-10-CM

## 2019-10-17 DIAGNOSIS — E78.2 MIXED HYPERLIPIDEMIA: ICD-10-CM

## 2019-10-17 DIAGNOSIS — Z00.00 MEDICARE ANNUAL WELLNESS VISIT, SUBSEQUENT: ICD-10-CM

## 2019-10-17 PROCEDURE — 99214 OFFICE O/P EST MOD 30 MIN: CPT | Performed by: INTERNAL MEDICINE

## 2019-10-17 PROCEDURE — G0439 PPPS, SUBSEQ VISIT: HCPCS | Performed by: INTERNAL MEDICINE

## 2019-10-17 NOTE — PROGRESS NOTES
INTERNAL MEDICINE FOLLOW-UP OFFICE VISIT  [facility] - [facility]    NAME: Lisa Patton  AGE: 80 y o  SEX: male  : 1927     DATE: 10/17/2019     Assessment and Plan:     1  Primary osteoarthritis of both hands  2  Carpal tunnel syndrome of right wrist  3  Essential hypertension  4  Mixed hyperlipidemia  5  Coronary artery disease involving native coronary artery of native heart without angina pectoris    Patient's health has been stable  Has chronic arthritis of bilateral hands  Likely has some element of carpal tunnel in right wrist but he does not want any treatment at this time  Will check UTD lab work  Continue current medications as prescribed  He doesn't smoke too much anymore  No recent cardiac symptoms  Orders Placed This Encounter   Procedures    CBC    Comprehensive metabolic panel    Lipid panel       Chief Complaint:     Chief Complaint   Patient presents with    Follow-up     pt states he has very litttle feeling in his hands  History of Present Illness:     Patient presents for follow-up  Hasn't been seen in about a year  No real problems with his health  Saw Dr Mary Isabel yesterday for some skin problems and was prescribed medication  He has a lot of arthritis in his hands and he deals with it  Previously had carpal tunnel surgery on left wrist and now he thinks he has some degree in right wrist  Doesn't want to do anything about it  Notices numbness/tingling sensations randomly, but not enough that it bothers him  No recent hospitalizations or ER visit  No chest pain, SOB, abd pain, nausea, vomiting  Review of Systems:     Review of Systems   Constitutional: Negative for activity change, appetite change and fatigue  Respiratory: Negative for apnea, cough, chest tightness, shortness of breath and wheezing  Cardiovascular: Negative for chest pain, palpitations and leg swelling     Gastrointestinal: Negative for abdominal distention, abdominal pain, blood in stool, constipation, diarrhea, nausea and vomiting  Musculoskeletal: Positive for arthralgias and gait problem  Negative for back pain, joint swelling and myalgias  Skin: Negative for rash and wound  Neurological: Positive for numbness  Negative for dizziness, weakness, light-headedness and headaches  Psychiatric/Behavioral: Negative for behavioral problems, confusion, hallucinations, sleep disturbance and suicidal ideas  The patient is not nervous/anxious  Problem List:     Patient Active Problem List   Diagnosis    Hypertension    Hyperlipidemia    CAD (coronary artery disease)    Benign prostatic hyperplasia    Actinic keratosis        Objective:     /70 (BP Location: Left arm, Patient Position: Sitting, Cuff Size: Standard)   Pulse 82   Ht 5' 3 75" (1 619 m)   Wt 82 3 kg (181 lb 6 4 oz)   SpO2 98%   BMI 31 38 kg/m²     Physical Exam   Constitutional: He is oriented to person, place, and time  He appears well-developed and well-nourished  No distress  Eyes: Conjunctivae are normal  Right eye exhibits no discharge  Left eye exhibits no discharge  No scleral icterus  Neck: Neck supple  No JVD present  No thyromegaly present  Cardiovascular: Normal rate, regular rhythm and normal heart sounds  No murmur heard  Pulmonary/Chest: Effort normal and breath sounds normal  No respiratory distress  He has no wheezes  He has no rales  He exhibits no tenderness  Abdominal: Soft  Bowel sounds are normal  He exhibits no distension  There is no tenderness  Musculoskeletal: He exhibits deformity (chronic arthritis deformities of hands and loss of ROM of fingers and wrist)  He exhibits no edema  Lymphadenopathy:     He has no cervical adenopathy  Neurological: He is alert and oriented to person, place, and time  Skin: Skin is warm and dry  Rash noted  He is not diaphoretic  Psychiatric: He has a normal mood and affect   His behavior is normal  Stan Sharma DO  [facility]

## 2019-10-17 NOTE — PATIENT INSTRUCTIONS
Medicare Preventive Visit Patient Instructions  Thank you for completing your Welcome to Medicare Visit or Medicare Annual Wellness Visit today  Your next wellness visit will be due in one year (10/17/2020)  The screening/preventive services that you may require over the next 5-10 years are detailed below  Some tests may not apply to you based off risk factors and/or age  Screening tests ordered at today's visit but not completed yet may show as past due  Also, please note that scanned in results may not display below  Preventive Screenings:  Service Recommendations Previous Testing/Comments   Colorectal Cancer Screening  · Colonoscopy    · Fecal Occult Blood Test (FOBT)/Fecal Immunochemical Test (FIT)  · Fecal DNA/Cologuard Test  · Flexible Sigmoidoscopy Age: 54-65 years old   Colonoscopy: every 10 years (May be performed more frequently if at higher risk)  OR  FOBT/FIT: every 1 year  OR  Cologuard: every 3 years  OR  Sigmoidoscopy: every 5 years  Screening may be recommended earlier than age 48 if at higher risk for colorectal cancer  Also, an individualized decision between you and your healthcare provider will decide whether screening between the ages of 74-80 would be appropriate   Colonoscopy: 08/01/2009  FOBT/FIT: Not on file  Cologuard: Not on file  Sigmoidoscopy: Not on file    Screening Not Indicated     Prostate Cancer Screening Individualized decision between patient and health care provider in men between ages of 53-78   Medicare will cover every 12 months beginning on the day after your 50th birthday PSA: No results in last 5 years     Screening Not Indicated     Hepatitis C Screening Once for adults born between [birth year] and 1965  More frequently in patients at high risk for Hepatitis C Hep C Antibody: Not on file    Screening Not Indicated   Diabetes Screening 1-2 times per year if you're at risk for diabetes or have pre-diabetes Fasting glucose: No results in last 5 years   A1C: No results in last 5 years       Cholesterol Screening Once every 5 years if you don't have a lipid disorder  May order more often based on risk factors  Lipid panel: Not on file    Screening Not Indicated  History Lipid Disorder      Other Preventive Screenings Covered by Medicare:  1  Abdominal Aortic Aneurysm (AAA) Screening: covered once if your at risk  You're considered to be at risk if you have a family history of AAA or a male between the age of 73-68 who smoking at least 100 cigarettes in your lifetime  2  Lung Cancer Screening: covers low dose CT scan once per year if you meet all of the following conditions: (1) Age 50-69; (2) No signs or symptoms of lung cancer; (3) Current smoker or have quit smoking within the last 15 years; (4) You have a tobacco smoking history of at least 30 pack years (packs per day x number of years you smoked); (5) You get a written order from a healthcare provider  3  Glaucoma Screening: covered annually if you're considered high risk: (1) You have diabetes OR (2) Family history of glaucoma OR (3)  aged 48 and older OR (3)  American aged 72 and older  3  Osteoporosis Screening: covered every 2 years if you meet one of the following conditions: (1) Have a vertebral abnormality; (2) On glucocorticoid therapy for more than 3 months; (3) Have primary hyperparathyroidism; (4) On osteoporosis medications and need to assess response to drug therapy  5  HIV Screening: covered annually if you're between the age of 12-76  Also covered annually if you are younger than 13 and older than 72 with risk factors for HIV infection  For pregnant patients, it is covered up to 3 times per pregnancy      Immunizations:  Immunization Recommendations   Influenza Vaccine Annual influenza vaccination during flu season is recommended for all persons aged >= 6 months who do not have contraindications   Pneumococcal Vaccine (Prevnar and Pneumovax)  * Prevnar = PCV13  * Pneumovax = PPSV23 Adults 19-64 years old: 1-3 doses may be recommended based on certain risk factors  Adults 72 years old: Prevnar (PCV13) vaccine recommended followed by Pneumovax (PPSV23) vaccine  If already received PPSV23 since turning 65, then PCV13 recommended at least one year after PPSV23 dose  Hepatitis B Vaccine 3 dose series if at intermediate or high risk (ex: diabetes, end stage renal disease, liver disease)   Tetanus (Td) Vaccine - COST NOT COVERED BY MEDICARE PART B Following completion of primary series, a booster dose should be given every 10 years to maintain immunity against tetanus  Td may also be given as tetanus wound prophylaxis  Tdap Vaccine - COST NOT COVERED BY MEDICARE PART B Recommended at least once for all adults  For pregnant patients, recommended with each pregnancy  Shingles Vaccine (Shingrix) - COST NOT COVERED BY MEDICARE PART B  2 shot series recommended in those aged 48 and above     Health Maintenance Due:  There are no preventive care reminders to display for this patient  Immunizations Due:      Topic Date Due    HEPATITIS B VACCINES (1 of 3 - Risk 3-dose series) 11/25/1946    INFLUENZA VACCINE  07/01/2019     Advance Directives   What are advance directives? Advance directives are legal documents that state your wishes and plans for medical care  These plans are made ahead of time in case you lose your ability to make decisions for yourself  Advance directives can apply to any medical decision, such as the treatments you want, and if you want to donate organs  What are the types of advance directives? There are many types of advance directives, and each state has rules about how to use them  You may choose a combination of any of the following:  · Living will: This is a written record of the treatment you want  You can also choose which treatments you do not want, which to limit, and which to stop at a certain time  This includes surgery, medicine, IV fluid, and tube feedings     · Durable power of  for healthcare [facility]): This is a written record that states who you want to make healthcare choices for you when you are unable to make them for yourself  This person, called a proxy, is usually a family member or a friend  You may choose more than 1 proxy  · Do not resuscitate (DNR) order:  A DNR order is used in case your heart stops beating or you stop breathing  It is a request not to have certain forms of treatment, such as CPR  A DNR order may be included in other types of advance directives  · Medical directive: This covers the care that you want if you are in a coma, near death, or unable to make decisions for yourself  You can list the treatments you want for each condition  Treatment may include pain medicine, surgery, blood transfusions, dialysis, IV or tube feedings, and a ventilator (breathing machine)  · Values history: This document has questions about your views, beliefs, and how you feel and think about life  This information can help others choose the care that you would choose  Why are advance directives important? An advance directive helps you control your care  Although spoken wishes may be used, it is better to have your wishes written down  Spoken wishes can be misunderstood, or not followed  Treatments may be given even if you do not want them  An advance directive may make it easier for your family to make difficult choices about your care  Fall Prevention    Fall prevention  includes ways to make your home and other areas safer  It also includes ways you can move more carefully to prevent a fall  Health conditions that cause changes in your blood pressure, vision, or muscle strength and coordination may increase your risk for falls  Medicines may also increase your risk for falls if they make you dizzy, weak, or sleepy  Fall prevention tips:   · Stand or sit up slowly  · Use assistive devices as directed      · Wear shoes that fit well and have soles that      · Wear a personal alarm  · Stay active  · Manage your medical conditions  Home Safety Tips:  · Add items to prevent falls in the bathroom  · Keep paths clear  · Install bright lights in your home  · Keep items you use often on shelves within reach  · Paint or place reflective tape on the edges of your stairs  Cigarette Smoking and Your Health   Risks to your health if you smoke:  Nicotine and other chemicals found in tobacco damage every cell in your body  Even if you are a light smoker, you have an increased risk for cancer, heart disease, and lung disease  If you are pregnant or have diabetes, smoking increases your risk for complications  Benefits to your health if you stop smoking:   · You decrease respiratory symptoms such as coughing, wheezing, and shortness of breath  · You reduce your risk for cancers of the lung, mouth, throat, kidney, bladder, pancreas, stomach, and cervix  If you already have cancer, you increase the benefits of chemotherapy  You also reduce your risk for cancer returning or a second cancer from developing  · You reduce your risk for heart disease, blood clots, heart attack, and stroke  · You reduce your risk for lung infections, and diseases such as pneumonia, asthma, chronic bronchitis, and emphysema  · Your circulation improves  More oxygen can be delivered to your body  If you have diabetes, you lower your risk for complications, such as kidney, artery, and eye diseases  You also lower your risk for nerve damage  Nerve damage can lead to amputations, poor vision, and blindness  · You improve your body's ability to heal and to fight infections  For more information and support to stop smoking:   · [employer]  gov  Phone: 3- 390 - 187-6129  Web Address: www [employer]  Weight Management   Why it is important to manage your weight:  Being overweight increases your risk of health conditions such as heart disease, high blood pressure, type 2 diabetes, and certain types of cancer  It can also increase your risk for osteoarthritis, sleep apnea, and other respiratory problems  Aim for a slow, steady weight loss  Even a small amount of weight loss can lower your risk of health problems  How to lose weight safely:  A safe and healthy way to lose weight is to eat fewer calories and get regular exercise  You can lose up about 1 pound a week by decreasing the number of calories you eat by 500 calories each day  Healthy meal plan for weight management:  A healthy meal plan includes a variety of foods, contains fewer calories, and helps you stay healthy  A healthy meal plan includes the following:  · Eat whole-grain foods more often  A healthy meal plan should contain fiber  Fiber is the part of grains, fruits, and vegetables that is not broken down by your body  Whole-grain foods are healthy and provide extra fiber in your diet  Some examples of whole-grain foods are whole-wheat breads and pastas, oatmeal, brown rice, and bulgur  · Eat a variety of vegetables every day  Include dark, leafy greens such as spinach, kale, michale greens, and mustard greens  Eat yellow and orange vegetables such as carrots, sweet potatoes, and winter squash  · Eat a variety of fruits every day  Choose fresh or canned fruit (canned in its own juice or light syrup) instead of juice  Fruit juice has very little or no fiber  · Eat low-fat dairy foods  Drink fat-free (skim) milk or 1% milk  Eat fat-free yogurt and low-fat cottage cheese  Try low-fat cheeses such as mozzarella and other reduced-fat cheeses  · Choose meat and other protein foods that are low in fat  Choose beans or other legumes such as split peas or lentils  Choose fish, skinless poultry (chicken or turkey), or lean cuts of red meat (beef or pork)  Before you cook meat or poultry, cut off any visible fat  · Use less fat and oil  Try baking foods instead of frying them   Add less fat, such as margarine, sour cream, regular salad dressing and mayonnaise to foods  Eat fewer high-fat foods  Some examples of high-fat foods include french fries, doughnuts, ice cream, and cakes  · Eat fewer sweets  Limit foods and drinks that are high in sugar  This includes candy, cookies, regular soda, and sweetened drinks  Exercise:  Exercise at least 30 minutes per day on most days of the week  Some examples of exercise include walking, biking, dancing, and swimming  You can also fit in more physical activity by taking the stairs instead of the elevator or parking farther away from stores  Ask your healthcare provider about the best exercise plan for you  © Copyright [employer] 2018 Information is for End User's use only and may not be sold, redistributed or otherwise used for commercial purposes   All illustrations and images included in CareNotes® are the copyrighted property of [employer]  or [address] [place] [address]

## 2019-10-17 NOTE — PROGRESS NOTES
Assessment and Plan:     1  Medicare annual wellness visit, subsequent    BMI Counseling: Body mass index is 31 38 kg/m²  The BMI is above normal  Nutrition recommendations include decreasing portion sizes, encouraging healthy choices of fruits and vegetables, moderation in carbohydrate intake and increasing intake of lean protein  Falls Plan of Care: balance, strength, and gait training instructions were provided  Recommended assistive device to help with gait and balance  Medications that increase falls were reviewed  Vitamin D supplementation was recommended  Fall prevention instructions were printed on patient's after visit summary  Tobacco Cessation Counseling: Tobacco cessation counseling was provided  The patient is sincerely urged to quit consumption of tobacco  He is not ready to quit tobacco  Medication options not discussed  Preventive health issues were discussed with patient, and age appropriate screening tests were ordered as noted in patient's After Visit Summary  Personalized health advice and appropriate referrals for health education or preventive services given if needed, as noted in patient's After Visit Summary       History of Present Illness:     Patient presents for Medicare Annual Wellness visit    Patient Care Team:  Clint Flores DO as PCP - General  Nida Tobin MD (Dermatology)     Problem List:     Patient Active Problem List   Diagnosis    Hypertension    Hyperlipidemia    CAD (coronary artery disease)    Benign prostatic hyperplasia    Actinic keratosis      Past Medical and Surgical History:     Past Medical History:   Diagnosis Date    Esophageal reflux     Hyperlipidemia     Hypertension      Past Surgical History:   Procedure Laterality Date    BACK SURGERY      CARDIAC SURGERY      COLON SURGERY      CORONARY ARTERY BYPASS GRAFT      TONSILLECTOMY      Last Assessed: 12/5/2016      Family History:     Family History   Problem Relation Age of Onset  Cancer Mother     Stroke Father       Social History:     Social History     Socioeconomic History    Marital status:      Spouse name: None    Number of children: None    Years of education: None    Highest education level: None   Occupational History    None   Social Needs    Financial resource strain: None    Food insecurity:     Worry: None     Inability: None    Transportation needs:     Medical: None     Non-medical: None   Tobacco Use    Smoking status: Current Some Day Smoker     Packs/day: 0 00     Types: Cigars    Smokeless tobacco: Never Used   Substance and Sexual Activity    Alcohol use:  Yes     Alcohol/week: 10 0 standard drinks     Types: 6 Glasses of wine, 4 Standard drinks or equivalent per week     Frequency: Monthly or less     Drinks per session: 1 or 2     Binge frequency: Never     Comment: occasionally    Drug use: No    Sexual activity: Not Currently   Lifestyle    Physical activity:     Days per week: 4 days     Minutes per session: 60 min    Stress: Not at all   Relationships    Social connections:     Talks on phone: None     Gets together: None     Attends [employer] service: None     Active member of club or organization: None     Attends meetings of clubs or organizations: None     Relationship status: None    Intimate partner violence:     Fear of current or ex partner: None     Emotionally abused: None     Physically abused: None     Forced sexual activity: None   Other Topics Concern    None   Social History Narrative    None       Medications and Allergies:     Current Outpatient Medications   Medication Sig Dispense Refill    aspirin (ECOTRIN) 325 mg EC tablet Take 1 tablet by mouth daily      lisinopril (ZESTRIL) 10 mg tablet Take 10 mg by mouth daily      pantoprazole (PROTONIX) 40 mg tablet Take 40 mg by mouth daily      simvastatin (ZOCOR) 40 mg tablet Take 40 mg by mouth daily      tamsulosin (FLOMAX) 0 4 mg Take 0 4 mg by mouth daily      TOPROL XL 25 MG 24 hr tablet       triamcinolone (KENALOG) 0 1 % ointment Apply topically 2 (two) times a day To arms till rash clears 80 g 1    Ascorbic Acid (VITAMIN C) 100 MG tablet Take by mouth as needed        fluorouracil (EFUDEX) 5 % cream Apply topically 2 (two) times a day      neomycin-polymyxin-hydrocortisone (CORTISPORIN) 0 35%-10,000 units/mL-1% otic suspension PUT 5 DROPS IN LEFT EAR TWICE A DAY AS NEEDED  2    Polyethylene Glycol 3350 (MIRALAX PO) Take by mouth as needed         No current facility-administered medications for this visit  Allergies   Allergen Reactions    Penicillins Rash     Rash on hands    Morphine Other (See Comments)     Low blood pressure      Immunizations:     Immunization History   Administered Date(s) Administered    H1N1, All Formulations 01/08/2010    Influenza, high dose seasonal 0 5 mL 10/19/2018    Pneumococcal Conjugate 13-Valent 10/19/2018    Tdap 06/28/2018      Health Maintenance: There are no preventive care reminders to display for this patient  Topic Date Due    HEPATITIS B VACCINES (1 of 3 - Risk 3-dose series) 11/25/1946    INFLUENZA VACCINE  07/01/2019      Medicare Health Risk Assessment:     /78 (BP Location: Left arm, Patient Position: Sitting, Cuff Size: Standard)   Pulse 82   Ht 5' 3 75" (1 619 m)   Wt 82 3 kg (181 lb 6 4 oz)   SpO2 98%   BMI 31 38 kg/m²      Ulysses Avina is here for his Subsequent Wellness visit  Health Risk Assessment:   Patient rates overall health as good  Patient feels that their physical health rating is same  Eyesight was rated as same  Hearing was rated as same  Patient feels that their emotional and mental health rating is same  Pain experienced in the last 7 days has been some  Patient's pain rating has been 1/10  Patient states that he has experienced no weight loss or gain in last 6 months  Depression Screening:   PHQ-2 Score: 0      Fall Risk Screening:    In the past year, patient has experienced: history of falling in past year    Number of falls: 1  Injured during fall?: Yes    Feels unsteady when standing or walking?: No    Worried about falling?: Yes      Home Safety:  Patient does not have trouble with stairs inside or outside of their home  Patient has working smoke alarms and has working carbon monoxide detector  Home safety hazards include: none  Nutrition:   Current diet is Regular  Medications:   Patient is currently taking over-the-counter supplements  OTC medications include: aspirin, vitamin C, miralax  Patient is able to manage medications  Activities of Daily Living (ADLs)/Instrumental Activities of Daily Living (IADLs):   Walk and transfer into and out of bed and chair?: Yes  Dress and groom yourself?: Yes    Bathe or shower yourself?: Yes    Feed yourself? Yes  Do your laundry/housekeeping?: Yes  Manage your money, pay your bills and track your expenses?: Yes  Make your own meals?: Yes    Do your own shopping?: Yes    Durable Medical Equipment Suppliers  None    Previous Hospitalizations:   Any hospitalizations or ED visits within the last 12 months?: No      Advance Care Planning:   Living will: Yes    Durable POA for healthcare:  Yes    Advanced directive: Yes    Five wishes given: No      Cognitive Screening:   Provider or family/friend/caregiver concerned regarding cognition?: No    PREVENTIVE SCREENINGS      Cardiovascular Screening:    General: Screening Not Indicated and History Lipid Disorder    Due for: Lipid Panel      Diabetes Screening:       Due for: Blood Glucose      Colorectal Cancer Screening:     General: Screening Not Indicated      Prostate Cancer Screening:    General: Screening Not Indicated      Osteoporosis Screening:    General: Screening Not Indicated      Abdominal Aortic Aneurysm (AAA) Screening:    Risk factors include: tobacco use        General: Screening Not Indicated      Lung Cancer Screening:     General: Screening Not Indicated Hepatitis C Screening:    General: Screening Not Indicated    Other Counseling Topics:   Car/seat belt/driving safety, skin self-exam, sunscreen and calcium and vitamin D intake and regular weightbearing exercise         Peggy Schwartz DO

## 2019-12-11 ENCOUNTER — OFFICE VISIT (OUTPATIENT)
Dept: DERMATOLOGY | Facility: CLINIC | Age: 84
End: 2019-12-11
Payer: MEDICARE

## 2019-12-11 DIAGNOSIS — L57.0 ACTINIC KERATOSIS: Primary | ICD-10-CM

## 2019-12-11 PROCEDURE — 99212 OFFICE O/P EST SF 10 MIN: CPT | Performed by: DERMATOLOGY

## 2019-12-11 PROCEDURE — 17000 DESTRUCT PREMALG LESION: CPT | Performed by: DERMATOLOGY

## 2019-12-11 PROCEDURE — 17003 DESTRUCT PREMALG LES 2-14: CPT | Performed by: DERMATOLOGY

## 2019-12-11 NOTE — PATIENT INSTRUCTIONS
Actinic Keratosis:  Patient advised lesions are precancers  These should resolve with cryosurgery if not to let us know sun block recommended  Irritant reaction has resolved no further treatment for with the topical steroid at this point  Treatment with Cryotherapy    The doctor has treated your skin with nitrogen, which is 320 degrees Fahrenheit below zero  He has given the treated area "frostbite "    Stinging should subside within a few hours  You can take Tylenol for pain, if needed  Over the next few days, it is normal if the area becomes reddened, a blood blister, or swollen with fluid  If the lesion treated was near the eye - you could get a swollen eye over the next few days  Do not panic! This is all temporary, and will resolve with time  There is no special treatment - just keep the area clean  Makeup and BandAids can be used, if preferred  When the area starts to dry up and peel off, using Vaseline can help healing  It usually takes up to a month for it to heal   Some lesions are recurrent and may require repeat treatments  If a lesion has not healed in one month, please don't hesitate to contact us  If you have any further questions that are not answered here, please call us  [phone]    Thank you for allowing us to care for you

## 2019-12-11 NOTE — PROGRESS NOTES
[facility] DERMATOLOGY  [address] [place] [id number]  [phone]  [phone]     MRN: [id number] : 1927  Encounter: [id number]  Patient Information: Marlene Organ    Subjective:     26-year-old male presents for follow-up secondary to the previous noted numerous actinic keratosis on his forearms and scalp patient stop using the topical therapy previously on has been using the cortisone ointment since last visit he notes marked improvement of the areas     Objective: There were no vitals taken for this visit  Physical Exam:    General Appearance:    Alert, cooperative, no distress   Skin:   Scaling erythematous keratotic areas noted below nothing else remarkable on exam the most of the inflammation noted previously has resolved  Physical Exam   Constitutional:       HENT:   Head:            Cryotherapy Procedure Note    Pre-operative Diagnosis: actinic keratosis    Plan:  1  Instructed to keep the area dry and clean thereafter  Apply petrolatum if area gets crusty  2  Recommended that the patient use acetaminophen  as needed for pain  Locations:  Dorsum of the arms and scalp    Indications: Destruction of actinic keratosis times 9    Patient informed of risks (permanent scarring, infection, light or dark discoloration, bleeding, infection, weakness, numbness and recurrence of the lesion) and benefits of the procedure and verbal informed consent obtained  The areas are treated with liquid nitrogen therapy, frozen until ice ball extended 2 mm beyond lesion, allowed to thaw, and treated again  The patient tolerated procedure well  The patient was instructed on post-op care, warned that there may be blister formation, redness and pain  Recommend OTC analgesia as needed for pain  Condition:  Stable    Complications:  none  Assessment:     1   Actinic keratosis           Plan:   Actinic Keratosis:  Patient advised lesions are precancers  These should resolve with cryosurgery if not to let us know sun block recommended  Irritant reaction has resolved no further treatment for with the topical steroid at this point      Prior to Admission medications    Medication Sig Start Date End Date Taking? Authorizing Provider   Ascorbic Acid (VITAMIN C) 100 MG tablet Take by mouth as needed     Yes Historical Provider, MD   aspirin (ECOTRIN) 325 mg EC tablet Take 1 tablet by mouth daily   Yes Historical Provider, MD   fluorouracil (EFUDEX) 5 % cream Apply topically 2 (two) times a day 12/12/16  Yes Historical Provider, MD   lisinopril (ZESTRIL) 10 mg tablet Take 10 mg by mouth daily 5/30/18  Yes Historical Provider, MD   pantoprazole (PROTONIX) 40 mg tablet Take 40 mg by mouth daily   Yes Historical Provider, MD   Polyethylene Glycol 3350 (MIRALAX PO) Take by mouth as needed     Yes Historical Provider, MD   simvastatin (ZOCOR) 40 mg tablet Take 40 mg by mouth daily   Yes Historical Provider, MD   tamsulosin (FLOMAX) 0 4 mg Take 0 4 mg by mouth daily   Yes Historical Provider, MD   TOPROL XL 25 MG 24 hr tablet  9/1/18  Yes Historical Provider, MD   triamcinolone (KENALOG) 0 1 % ointment Apply topically 2 (two) times a day To arms till rash clears 10/16/19  Yes Mauri Osman MD     Allergies   Allergen Reactions    Penicillins Rash     Rash on hands    Morphine Other (See Comments)     Low blood pressure       Mauri Osman MD  12/11/2019,1:02 PM    Portions of the record may have been created with voice recognition software   Occasional wrong word or "sound a like" substitutions may have occurred due to the inherent limitations of voice recognition software   Read the chart carefully and recognize, using context, where substitutions have occurred

## 2020-01-06 ENCOUNTER — OFFICE VISIT (OUTPATIENT)
Dept: INTERNAL MEDICINE CLINIC | Facility: CLINIC | Age: 85
End: 2020-01-06
Payer: MEDICARE

## 2020-01-06 VITALS
HEART RATE: 81 BPM | OXYGEN SATURATION: 95 % | DIASTOLIC BLOOD PRESSURE: 78 MMHG | BODY MASS INDEX: 30.1 KG/M2 | TEMPERATURE: 97.7 F | SYSTOLIC BLOOD PRESSURE: 118 MMHG | WEIGHT: 174 LBS

## 2020-01-06 DIAGNOSIS — K59.00 CONSTIPATION, UNSPECIFIED CONSTIPATION TYPE: Primary | ICD-10-CM

## 2020-01-06 PROBLEM — I13.0 HYPERTENSIVE HEART AND CHRONIC KIDNEY DISEASE WITH HEART FAILURE AND STAGE 1 THROUGH STAGE 4 CHRONIC KIDNEY DISEASE, OR CHRONIC KIDNEY DISEASE (HCC): Status: ACTIVE | Noted: 2020-01-06

## 2020-01-06 PROCEDURE — 99213 OFFICE O/P EST LOW 20 MIN: CPT | Performed by: INTERNAL MEDICINE

## 2020-01-06 RX ORDER — LACTULOSE 10 G/10G
10 SOLUTION ORAL 3 TIMES DAILY
Qty: 30 EACH | Refills: 0 | Status: SHIPPED | OUTPATIENT
Start: 2020-01-06 | End: 2020-10-15

## 2020-01-06 NOTE — PROGRESS NOTES
INTERNAL MEDICINE FOLLOW-UP OFFICE VISIT  [facility] [address] [place] [address]    NAME: Renato Jefferson  AGE: 80 y o  SEX: male  : 1927     DATE: 2020     Assessment and Plan:     1  Constipation, unspecified constipation type    Increase water intake  Physical activity encouraged  Discussed use of stool softeners, prune juice, miralax, and stimulant laxatives  Will prescribe lactulose as needed  - lactulose (CEPHULAC) 10 g packet; Take 1 packet (10 g total) by mouth 3 (three) times a day  Dispense: 30 each; Refill: 0    BMI Counseling: Body mass index is 30 1 kg/m²  The BMI is above normal  Nutrition recommendations include moderation in carbohydrate intake and increasing intake of lean protein  Exercise recommendations include exercising 3-5 times per week  Chief Complaint:     Chief Complaint   Patient presents with    Nausea     for a month    Constipation      History of Present Illness:     Patient presents with nausea and constipation for 1 month  Last good BM was 1 5 weeks ago  Has been passing flatus  Started taking stool softeners and fiber supplement  No increased abdominal distention or pain  No vomiting  No current nausea  No anorexia  Review of Systems:     Review of Systems   Constitutional: Negative  Respiratory: Negative  Cardiovascular: Negative  Gastrointestinal: Positive for constipation and nausea  Negative for abdominal distention, abdominal pain, anal bleeding and blood in stool        Problem List:     Patient Active Problem List   Diagnosis    Hypertension    Hyperlipidemia    CAD (coronary artery disease)    Benign prostatic hyperplasia    Actinic keratosis      Objective:     /78 (BP Location: Left arm, Patient Position: Sitting, Cuff Size: Standard)   Pulse 81   Temp 97 7 °F (36 5 °C) (Tympanic) Comment: NO NSAIDS  Wt 78 9 kg (174 lb) Comment: w/ shoes denied off  SpO2 95%   BMI 30 10 kg/m²     Physical Exam   Constitutional: He appears well-developed and well-nourished  No distress  Abdominal: Soft  He exhibits no distension and no mass  Bowel sounds are decreased  There is no tenderness  There is no guarding  Skin: He is not diaphoretic        Jr Delgado DO  [facility]

## 2020-01-06 NOTE — PATIENT INSTRUCTIONS
Constipation   WHAT YOU NEED TO KNOW:   Constipation is when you have hard, dry bowel movements, or you go longer than usual between bowel movements  DISCHARGE INSTRUCTIONS:   Return to the emergency department if:   · You have blood in your bowel movements  · You have a fever and abdominal pain with the constipation  Contact your healthcare provider if:   · Your constipation gets worse  · You start to vomit  · You have questions or concerns about your condition or care  Medicines:   · Medicine or a fiber supplement  may help make your bowel movement softer  A laxative may help relax and loosen your intestines to help you have a bowel movement  You may also be given medicine to increase fluid in your intestines  The fluid may help move bowel movements through your intestines  · Take your medicine as directed  Contact your healthcare provider if you think your medicine is not helping or if you have side effects  Tell him of her if you are allergic to any medicine  Keep a list of the medicines, vitamins, and herbs you take  Include the amounts, and when and why you take them  Bring the list or the pill bottles to follow-up visits  Carry your medicine list with you in case of an emergency  Manage your constipation:   · Drink liquids as directed  You may need to drink extra liquids to help soften and move your bowels  Ask how much liquid to drink each day and which liquids are best for you  · Eat high-fiber foods  This may help decrease constipation by adding bulk to your bowel movements  High-fiber foods include fruit, vegetables, whole-grain breads and cereals, and beans  Your healthcare provider or dietitian can help you create a high-fiber meal plan  · Exercise regularly  Regular physical activity can help stimulate your intestines  Ask which exercises are best for you  · Schedule a time each day to have a bowel movement    This may help train your body to have regular bowel movements  Bend forward while you are on the toilet to help move the bowel movement out  Sit on the toilet for at least 10 minutes, even if you do not have a bowel movement  Follow up with your healthcare provider as directed:  Write down your questions so you remember to ask them during your visits  © 2017 [employer] Information is for End User's use only and may not be sold, redistributed or otherwise used for commercial purposes  All illustrations and images included in CareNotes® are the copyrighted property of [employer]  or [employer]  The above information is an  only  It is not intended as medical advice for individual conditions or treatments  Talk to your doctor, nurse or pharmacist before following any medical regimen to see if it is safe and effective for you

## 2020-01-20 ENCOUNTER — OFFICE VISIT (OUTPATIENT)
Dept: INTERNAL MEDICINE CLINIC | Facility: CLINIC | Age: 85
End: 2020-01-20
Payer: MEDICARE

## 2020-01-20 VITALS
OXYGEN SATURATION: 96 % | WEIGHT: 172.6 LBS | DIASTOLIC BLOOD PRESSURE: 80 MMHG | HEART RATE: 88 BPM | HEIGHT: 64 IN | BODY MASS INDEX: 29.47 KG/M2 | TEMPERATURE: 97.6 F | SYSTOLIC BLOOD PRESSURE: 140 MMHG

## 2020-01-20 DIAGNOSIS — K59.00 CONSTIPATION, UNSPECIFIED CONSTIPATION TYPE: Primary | ICD-10-CM

## 2020-01-20 DIAGNOSIS — I10 ESSENTIAL HYPERTENSION: ICD-10-CM

## 2020-01-20 DIAGNOSIS — Z13.31 DEPRESSION SCREENING NEGATIVE: ICD-10-CM

## 2020-01-20 DIAGNOSIS — I13.0 HYPERTENSIVE HEART AND CHRONIC KIDNEY DISEASE WITH HEART FAILURE AND STAGE 1 THROUGH STAGE 4 CHRONIC KIDNEY DISEASE, OR CHRONIC KIDNEY DISEASE (HCC): ICD-10-CM

## 2020-01-20 DIAGNOSIS — E78.2 MIXED HYPERLIPIDEMIA: ICD-10-CM

## 2020-01-20 PROCEDURE — 99214 OFFICE O/P EST MOD 30 MIN: CPT | Performed by: PHYSICIAN ASSISTANT

## 2020-01-20 RX ORDER — LACTULOSE 10 G/10G
10 SOLUTION ORAL 3 TIMES DAILY
Qty: 30 EACH | Refills: 0 | Status: SHIPPED | OUTPATIENT
Start: 2020-01-20 | End: 2020-01-28 | Stop reason: SDUPTHER

## 2020-01-20 RX ORDER — LACTULOSE 10 G/10G
10 SOLUTION ORAL 3 TIMES DAILY
Qty: 30 EACH | Refills: 0 | Status: SHIPPED | OUTPATIENT
Start: 2020-01-20 | End: 2020-01-20 | Stop reason: SDUPTHER

## 2020-01-21 ENCOUNTER — APPOINTMENT (OUTPATIENT)
Dept: LAB | Facility: HOSPITAL | Age: 85
End: 2020-01-21
Payer: MEDICARE

## 2020-01-21 ENCOUNTER — HOSPITAL ENCOUNTER (OUTPATIENT)
Dept: RADIOLOGY | Facility: HOSPITAL | Age: 85
Discharge: HOME | End: 2020-01-21
Payer: MEDICARE

## 2020-01-21 ENCOUNTER — TELEPHONE (OUTPATIENT)
Dept: INTERNAL MEDICINE CLINIC | Facility: CLINIC | Age: 85
End: 2020-01-21

## 2020-01-21 DIAGNOSIS — Z13.31 DEPRESSION SCREENING NEGATIVE: ICD-10-CM

## 2020-01-21 DIAGNOSIS — I10 ESSENTIAL HYPERTENSION: ICD-10-CM

## 2020-01-21 DIAGNOSIS — K59.00 CONSTIPATION, UNSPECIFIED CONSTIPATION TYPE: ICD-10-CM

## 2020-01-21 DIAGNOSIS — I13.0 HYPERTENSIVE HEART AND CHRONIC KIDNEY DISEASE WITH HEART FAILURE AND STAGE 1 THROUGH STAGE 4 CHRONIC KIDNEY DISEASE, OR CHRONIC KIDNEY DISEASE (HCC): ICD-10-CM

## 2020-01-21 LAB
ANION GAP SERPL CALCULATED.3IONS-SCNC: 9 MMOL/L (ref 4–13)
BASOPHILS # BLD AUTO: 0.04 THOUSANDS/ΜL (ref 0–0.1)
BASOPHILS NFR BLD AUTO: 1 % (ref 0–1)
BILIRUB UR QL STRIP: NEGATIVE
BUN SERPL-MCNC: 20 MG/DL (ref 5–25)
CALCIUM SERPL-MCNC: 8.9 MG/DL (ref 8.3–10.1)
CHLORIDE SERPL-SCNC: 91 MMOL/L (ref 100–108)
CLARITY UR: CLEAR
CO2 SERPL-SCNC: 23 MMOL/L (ref 21–32)
COLOR UR: YELLOW
CREAT SERPL-MCNC: 1.63 MG/DL (ref 0.6–1.3)
EOSINOPHIL # BLD AUTO: 0.15 THOUSAND/ΜL (ref 0–0.61)
EOSINOPHIL NFR BLD AUTO: 3 % (ref 0–6)
ERYTHROCYTE [DISTWIDTH] IN BLOOD BY AUTOMATED COUNT: 13 % (ref 11.6–15.1)
GFR SERPL CREATININE-BSD FRML MDRD: 36 ML/MIN/1.73SQ M
GLUCOSE P FAST SERPL-MCNC: 109 MG/DL (ref 65–99)
GLUCOSE UR STRIP-MCNC: NEGATIVE MG/DL
HCT VFR BLD AUTO: 37.1 % (ref 36.5–49.3)
HGB BLD-MCNC: 12.6 G/DL (ref 12–17)
HGB UR QL STRIP.AUTO: NEGATIVE
IMM GRANULOCYTES # BLD AUTO: 0.02 THOUSAND/UL (ref 0–0.2)
IMM GRANULOCYTES NFR BLD AUTO: 0 % (ref 0–2)
KETONES UR STRIP-MCNC: NEGATIVE MG/DL
LEUKOCYTE ESTERASE UR QL STRIP: NEGATIVE
LYMPHOCYTES # BLD AUTO: 0.89 THOUSANDS/ΜL (ref 0.6–4.47)
LYMPHOCYTES NFR BLD AUTO: 15 % (ref 14–44)
MCH RBC QN AUTO: 32.9 PG (ref 26.8–34.3)
MCHC RBC AUTO-ENTMCNC: 34 G/DL (ref 31.4–37.4)
MCV RBC AUTO: 97 FL (ref 82–98)
MONOCYTES # BLD AUTO: 0.59 THOUSAND/ΜL (ref 0.17–1.22)
MONOCYTES NFR BLD AUTO: 10 % (ref 4–12)
NEUTROPHILS # BLD AUTO: 4.43 THOUSANDS/ΜL (ref 1.85–7.62)
NEUTS SEG NFR BLD AUTO: 71 % (ref 43–75)
NITRITE UR QL STRIP: NEGATIVE
NRBC BLD AUTO-RTO: 0 /100 WBCS
PH UR STRIP.AUTO: 5.5 [PH]
PLATELET # BLD AUTO: 243 THOUSANDS/UL (ref 149–390)
PMV BLD AUTO: 9.4 FL (ref 8.9–12.7)
POTASSIUM SERPL-SCNC: 4.8 MMOL/L (ref 3.5–5.3)
PROT UR STRIP-MCNC: NEGATIVE MG/DL
RBC # BLD AUTO: 3.83 MILLION/UL (ref 3.88–5.62)
SODIUM SERPL-SCNC: 123 MMOL/L (ref 136–145)
SP GR UR STRIP.AUTO: 1.01 (ref 1–1.03)
UROBILINOGEN UR QL STRIP.AUTO: 0.2 E.U./DL
WBC # BLD AUTO: 6.12 THOUSAND/UL (ref 4.31–10.16)

## 2020-01-21 PROCEDURE — 74022 RADEX COMPL AQT ABD SERIES: CPT

## 2020-01-21 PROCEDURE — 36415 COLL VENOUS BLD VENIPUNCTURE: CPT

## 2020-01-21 PROCEDURE — 85025 COMPLETE CBC W/AUTO DIFF WBC: CPT

## 2020-01-21 PROCEDURE — 80048 BASIC METABOLIC PNL TOTAL CA: CPT

## 2020-01-21 PROCEDURE — 81003 URINALYSIS AUTO W/O SCOPE: CPT | Performed by: PHYSICIAN ASSISTANT

## 2020-01-21 NOTE — TELEPHONE ENCOUNTER
----- Message from Tio Rothman PA-C sent at 1/21/2020 11:12 AM EST -----  I spoke to the patient I asked him to limit his fluids to 1 L per day will follow-up in a week I am attempting to past this a message on to his daughter

## 2020-01-21 NOTE — PROGRESS NOTES
Assessment/Plan: abdomen exam negative no tenderness he was reassured he will continue with the lactulose getting some labs he will follow-up with Dr Rebekah Loomis 10 and get a colonoscopy  Will see if abdomen x-ray indicates constipation       Diagnoses and all orders for this visit:    Constipation, unspecified constipation type  -     Discontinue: lactulose (CEPHULAC) 10 g packet; Take 1 packet (10 g total) by mouth 3 (three) times a day  -     lactulose (CEPHULAC) 10 g packet; Take 1 packet (10 g total) by mouth 3 (three) times a day  -     XR abdomen obstruction series; Future  -     CBC and differential; Future  -     Basic metabolic panel; Future  -     UA w Reflex to Microscopic w Reflex to Culture    Essential hypertension  -     XR abdomen obstruction series; Future  -     CBC and differential; Future  -     Basic metabolic panel; Future  -     UA w Reflex to Microscopic w Reflex to Culture    Depression screening negative  -     XR abdomen obstruction series; Future  -     CBC and differential; Future  -     Basic metabolic panel; Future  -     UA w Reflex to Microscopic w Reflex to Culture    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, or chronic kidney disease (HCC)  -     XR abdomen obstruction series; Future  -     CBC and differential; Future  -     Basic metabolic panel; Future  -     UA w Reflex to Microscopic w Reflex to Culture    Mixed hyperlipidemia        No problem-specific Assessment & Plan notes found for this encounter  Subjective:      Patient ID: Marky Campo is a 80 y o  male  Continues to complain of constipation  His memory is very poor  He had a scant solid bowel movement yesterday  Some liquid stool he is taking lactulose 3 times a day    He feels somewhat full but no abdominal pain no nausea vomiting he is eating but is not eating as well as he normally does he has had several: Surgeries in the past and he has had previous colonoscopies from his colorectal surgeon hypertension hyperlipidemia well controlled with meds      The following portions of the patient's history were reviewed and updated as appropriate:   He has a past medical history of Esophageal reflux, Hyperlipidemia, and Hypertension  ,  does not have any pertinent problems on file  ,   has a past surgical history that includes Back surgery; Colon surgery; Cardiac surgery; Coronary artery bypass graft; and Tonsillectomy  ,  family history includes Cancer in his mother; Stroke in his father  ,   reports that he has been smoking cigars  He has been smoking about 0 00 packs per day  He uses smokeless tobacco  He reports that he drinks about 10 0 standard drinks of alcohol per week  He reports that he does not use drugs  ,  is allergic to penicillins and morphine     Current Outpatient Medications   Medication Sig Dispense Refill    aspirin (ECOTRIN) 325 mg EC tablet Take 1 tablet by mouth daily      lactulose (CEPHULAC) 10 g packet Take 1 packet (10 g total) by mouth 3 (three) times a day 30 each 0    lisinopril (ZESTRIL) 10 mg tablet Take 10 mg by mouth daily      pantoprazole (PROTONIX) 40 mg tablet Take 40 mg by mouth daily      simvastatin (ZOCOR) 40 mg tablet Take 40 mg by mouth daily      tamsulosin (FLOMAX) 0 4 mg Take 0 4 mg by mouth daily      TOPROL XL 25 MG 24 hr tablet Take 25 mg by mouth daily       lactulose (CEPHULAC) 10 g packet Take 1 packet (10 g total) by mouth 3 (three) times a day 30 each 0    Polyethylene Glycol 3350 (MIRALAX PO) Take by mouth as needed         No current facility-administered medications for this visit  Review of Systems   Constitutional: Negative for activity change, appetite change, chills, diaphoresis, fatigue, fever and unexpected weight change     HENT: Negative for congestion, dental problem, drooling, ear discharge, ear pain, facial swelling, hearing loss, nosebleeds, postnasal drip, rhinorrhea, sinus pressure, sinus pain, sneezing, sore throat, tinnitus, trouble swallowing and voice change  Eyes: Negative for photophobia, pain, discharge, redness, itching and visual disturbance  Respiratory: Negative for apnea, cough, choking, chest tightness, shortness of breath, wheezing and stridor  Cardiovascular: Negative for chest pain, palpitations and leg swelling  Gastrointestinal: Positive for constipation  Negative for abdominal distention, abdominal pain, anal bleeding, blood in stool, diarrhea, nausea, rectal pain and vomiting  Endocrine: Negative for cold intolerance, heat intolerance, polydipsia, polyphagia and polyuria  Genitourinary: Negative for decreased urine volume, difficulty urinating, dysuria, enuresis, flank pain, frequency, genital sores, hematuria and urgency  Musculoskeletal: Negative for arthralgias, back pain, gait problem, joint swelling, myalgias, neck pain and neck stiffness  Skin: Negative for color change, pallor, rash and wound  Neurological: Negative for dizziness, tremors, seizures, syncope, facial asymmetry, speech difficulty, weakness, light-headedness, numbness and headaches  Hematological: Negative for adenopathy  Does not bruise/bleed easily  Psychiatric/Behavioral: Negative for agitation, behavioral problems, confusion, decreased concentration, dysphoric mood, hallucinations, self-injury, sleep disturbance and suicidal ideas  The patient is not nervous/anxious and is not hyperactive  Objective:  Vitals:    01/20/20 1635   BP: 140/80   BP Location: Left arm   Patient Position: Sitting   Cuff Size: Standard   Pulse: 88   Temp: 97 6 °F (36 4 °C)   TempSrc: Oral   SpO2: 96%   Weight: 78 3 kg (172 lb 9 6 oz)   Height: 5' 3 75" (1 619 m)     Body mass index is 29 86 kg/m²  Physical Exam   Constitutional: He is oriented to person, place, and time  He appears well-developed  obese   HENT:   Head: Normocephalic     Right Ear: External ear normal    Left Ear: External ear normal    Mouth/Throat: Oropharynx is clear and moist    Eyes: Pupils are equal, round, and reactive to light  Conjunctivae are normal    Neck: Normal range of motion  Neck supple  No JVD present  No thyromegaly present  Cardiovascular: Normal rate, regular rhythm, normal heart sounds and intact distal pulses  No murmur heard  Pulmonary/Chest: Effort normal and breath sounds normal    Abdominal: Soft  Bowel sounds are normal  He exhibits no distension and no mass  There is no tenderness  There is no rebound and no guarding  No hernia  Musculoskeletal: Normal range of motion  He exhibits no edema  Lymphadenopathy:     He has no cervical adenopathy  Neurological: He is alert and oriented to person, place, and time  He has normal reflexes  Skin: Skin is warm and dry  No erythema  Psychiatric: He has a normal mood and affect  His behavior is normal  Judgment normal  Cognition and memory are impaired  He exhibits abnormal recent memory  Vitals reviewed

## 2020-01-21 NOTE — TELEPHONE ENCOUNTER
----- Message from Esther Meier PA-C sent at 1/21/2020 11:12 AM EST -----   Hyponatremia I instructed him to limit his fluids please call him to be followed up next week for an appointment

## 2020-01-28 ENCOUNTER — OFFICE VISIT (OUTPATIENT)
Dept: INTERNAL MEDICINE CLINIC | Facility: CLINIC | Age: 85
End: 2020-01-28
Payer: MEDICARE

## 2020-01-28 VITALS
HEART RATE: 80 BPM | RESPIRATION RATE: 14 BRPM | SYSTOLIC BLOOD PRESSURE: 132 MMHG | DIASTOLIC BLOOD PRESSURE: 82 MMHG | WEIGHT: 169 LBS | HEIGHT: 64 IN | BODY MASS INDEX: 28.85 KG/M2

## 2020-01-28 DIAGNOSIS — K59.00 CONSTIPATION, UNSPECIFIED CONSTIPATION TYPE: Primary | ICD-10-CM

## 2020-01-28 DIAGNOSIS — M19.032 PRIMARY OSTEOARTHRITIS OF LEFT WRIST: ICD-10-CM

## 2020-01-28 DIAGNOSIS — I10 ESSENTIAL HYPERTENSION: ICD-10-CM

## 2020-01-28 DIAGNOSIS — Z13.31 DEPRESSION SCREENING NEGATIVE: ICD-10-CM

## 2020-01-28 PROCEDURE — 99214 OFFICE O/P EST MOD 30 MIN: CPT | Performed by: PHYSICIAN ASSISTANT

## 2020-01-28 NOTE — PROGRESS NOTES
Assessment/Plan: he had a large bowel movement 3 days ago followed by some liquid stool now getting back to normal   He feels well he will see Dr Campos possibly a colonoscopy  He will take the MiraLax once a day only       Diagnoses and all orders for this visit:    Constipation, unspecified constipation type    Essential hypertension    Depression screening negative    Primary osteoarthritis of left wrist        No problem-specific Assessment & Plan notes found for this encounter  Subjective:      Patient ID: Patrick Lowe is a 80 y o  male  Back for follow-up seen last week because of ongoing problem with constipation  He increased his lactulose and now his bowels have moved some liquid stools until 2 days ago  No abdominal pain or fullness is appetite is good no blood in the stools he feels well no chest pain shortness of breath hypertension well controlled with meds      The following portions of the patient's history were reviewed and updated as appropriate:   He has a past medical history of Esophageal reflux, Hyperlipidemia, and Hypertension  ,  does not have any pertinent problems on file  ,   has a past surgical history that includes Back surgery; Colon surgery; Cardiac surgery; Coronary artery bypass graft; and Tonsillectomy  ,  family history includes Cancer in his mother; Stroke in his father  ,   reports that he has quit smoking  His smoking use included cigars and cigarettes  He has a 20 00 pack-year smoking history  He uses smokeless tobacco  He reports that he drinks alcohol  He reports that he does not use drugs  ,  is allergic to penicillins and morphine     Current Outpatient Medications   Medication Sig Dispense Refill    aspirin (ECOTRIN) 325 mg EC tablet Take 1 tablet by mouth daily      lactulose (CEPHULAC) 10 g packet Take 1 packet (10 g total) by mouth 3 (three) times a day 30 each 0    lisinopril (ZESTRIL) 10 mg tablet Take 10 mg by mouth daily      pantoprazole (PROTONIX) 40 mg tablet Take 40 mg by mouth daily      Polyethylene Glycol 3350 (MIRALAX PO) Take by mouth as needed        simvastatin (ZOCOR) 40 mg tablet Take 40 mg by mouth daily      tamsulosin (FLOMAX) 0 4 mg Take 0 4 mg by mouth daily      TOPROL XL 25 MG 24 hr tablet Take 25 mg by mouth daily        No current facility-administered medications for this visit  Review of Systems   Constitutional: Negative for activity change, appetite change, chills, diaphoresis, fatigue, fever and unexpected weight change  HENT: Negative for congestion, dental problem, drooling, ear discharge, ear pain, facial swelling, hearing loss, nosebleeds, postnasal drip, rhinorrhea, sinus pressure, sinus pain, sneezing, sore throat, tinnitus, trouble swallowing and voice change  Eyes: Negative for photophobia, pain, discharge, redness, itching and visual disturbance  Respiratory: Negative for apnea, cough, choking, chest tightness, shortness of breath, wheezing and stridor  Cardiovascular: Negative for chest pain, palpitations and leg swelling  Gastrointestinal: Negative for abdominal distention, abdominal pain, anal bleeding, blood in stool, constipation, diarrhea, nausea, rectal pain and vomiting  Endocrine: Negative for cold intolerance, heat intolerance, polydipsia, polyphagia and polyuria  Genitourinary: Negative for decreased urine volume, difficulty urinating, dysuria, enuresis, flank pain, frequency, genital sores, hematuria and urgency  Musculoskeletal: Negative for arthralgias, back pain, gait problem, joint swelling, myalgias, neck pain and neck stiffness  Skin: Negative for color change, pallor, rash and wound  Neurological: Negative for dizziness, tremors, seizures, syncope, facial asymmetry, speech difficulty, weakness, light-headedness, numbness and headaches  Hematological: Negative for adenopathy  Does not bruise/bleed easily     Psychiatric/Behavioral: Negative for agitation, behavioral problems, confusion, decreased concentration, dysphoric mood, hallucinations, self-injury, sleep disturbance and suicidal ideas  The patient is not nervous/anxious and is not hyperactive  Objective:  Vitals:    01/28/20 1324   BP: 132/82   BP Location: Left arm   Patient Position: Sitting   Cuff Size: Standard   Pulse: 80   Resp: 14   Weight: 76 7 kg (169 lb)   Height: 5' 3 75" (1 619 m)     Body mass index is 29 24 kg/m²  Physical Exam   Constitutional: He is oriented to person, place, and time  He appears well-developed  HENT:   Head: Normocephalic  Right Ear: External ear normal    Left Ear: External ear normal    Mouth/Throat: Oropharynx is clear and moist    Eyes: Pupils are equal, round, and reactive to light  Conjunctivae are normal    Neck: Neck supple  No JVD present  No thyromegaly present  Cardiovascular: Normal rate, regular rhythm and intact distal pulses  Murmur heard  Pulmonary/Chest: Effort normal and breath sounds normal    Abdominal: Soft  Bowel sounds are normal  He exhibits no distension and no mass  There is no tenderness  There is no rebound and no guarding  No hernia  Musculoskeletal: Normal range of motion  He exhibits no edema  Lymphadenopathy:     He has no cervical adenopathy  Neurological: He is alert and oriented to person, place, and time  He has normal reflexes  Skin: Skin is warm and dry  Psychiatric: He has a normal mood and affect  His behavior is normal  Thought content normal  Cognition and memory are impaired

## 2020-05-06 ENCOUNTER — TELEPHONE (OUTPATIENT)
Dept: INTERNAL MEDICINE CLINIC | Facility: CLINIC | Age: 85
End: 2020-05-06

## 2020-05-07 ENCOUNTER — TELEMEDICINE (OUTPATIENT)
Dept: INTERNAL MEDICINE CLINIC | Facility: CLINIC | Age: 85
End: 2020-05-07
Payer: MEDICARE

## 2020-05-07 DIAGNOSIS — I10 ESSENTIAL HYPERTENSION: Primary | ICD-10-CM

## 2020-05-07 DIAGNOSIS — I25.10 CORONARY ARTERY DISEASE INVOLVING NATIVE CORONARY ARTERY OF NATIVE HEART WITHOUT ANGINA PECTORIS: ICD-10-CM

## 2020-05-07 DIAGNOSIS — E78.2 MIXED HYPERLIPIDEMIA: ICD-10-CM

## 2020-05-07 PROCEDURE — 99441 PR PHYS/QHP TELEPHONE EVALUATION 5-10 MIN: CPT | Performed by: INTERNAL MEDICINE

## 2020-06-24 ENCOUNTER — TELEPHONE (OUTPATIENT)
Dept: INTERNAL MEDICINE CLINIC | Facility: CLINIC | Age: 85
End: 2020-06-24

## 2020-06-24 DIAGNOSIS — M10.471 ACUTE GOUT DUE TO OTHER SECONDARY CAUSE INVOLVING TOE OF RIGHT FOOT: Primary | ICD-10-CM

## 2020-06-25 RX ORDER — PREDNISONE 10 MG/1
TABLET ORAL
Qty: 30 TABLET | Refills: 0 | Status: SHIPPED | OUTPATIENT
Start: 2020-06-25 | End: 2020-07-05

## 2020-06-26 ENCOUNTER — TELEPHONE (OUTPATIENT)
Dept: INTERNAL MEDICINE CLINIC | Facility: CLINIC | Age: 85
End: 2020-06-26

## 2020-06-26 ENCOUNTER — TRANSITIONAL CARE MANAGEMENT (OUTPATIENT)
Dept: INTERNAL MEDICINE CLINIC | Facility: CLINIC | Age: 85
End: 2020-06-26

## 2020-06-26 RX ORDER — LEVETIRACETAM 250 MG/1
250 TABLET ORAL 2 TIMES DAILY
COMMUNITY
Start: 2020-06-23 | End: 2020-07-02 | Stop reason: SDUPTHER

## 2020-07-01 ENCOUNTER — TELEPHONE (OUTPATIENT)
Dept: INTERNAL MEDICINE CLINIC | Facility: CLINIC | Age: 85
End: 2020-07-01

## 2020-07-02 ENCOUNTER — OFFICE VISIT (OUTPATIENT)
Dept: INTERNAL MEDICINE CLINIC | Facility: CLINIC | Age: 85
End: 2020-07-02
Payer: MEDICARE

## 2020-07-02 VITALS
WEIGHT: 153.4 LBS | DIASTOLIC BLOOD PRESSURE: 54 MMHG | HEIGHT: 64 IN | SYSTOLIC BLOOD PRESSURE: 108 MMHG | TEMPERATURE: 98 F | HEART RATE: 80 BPM | RESPIRATION RATE: 12 BRPM | BODY MASS INDEX: 26.19 KG/M2

## 2020-07-02 DIAGNOSIS — M10.072 ACUTE IDIOPATHIC GOUT INVOLVING TOE OF LEFT FOOT: ICD-10-CM

## 2020-07-02 DIAGNOSIS — R56.9 SEIZURE (HCC): Primary | ICD-10-CM

## 2020-07-02 DIAGNOSIS — I25.10 CORONARY ARTERY DISEASE INVOLVING NATIVE CORONARY ARTERY OF NATIVE HEART WITHOUT ANGINA PECTORIS: ICD-10-CM

## 2020-07-02 DIAGNOSIS — I10 ESSENTIAL HYPERTENSION: ICD-10-CM

## 2020-07-02 PROBLEM — M10.9 ACUTE GOUT INVOLVING TOE OF LEFT FOOT: Status: ACTIVE | Noted: 2020-07-02

## 2020-07-02 PROCEDURE — 99495 TRANSJ CARE MGMT MOD F2F 14D: CPT | Performed by: NURSE PRACTITIONER

## 2020-07-02 RX ORDER — THIAMINE MONONITRATE (VIT B1) 100 MG
100 TABLET ORAL DAILY
Qty: 30 TABLET | Refills: 1 | Status: SHIPPED | OUTPATIENT
Start: 2020-07-02 | End: 2020-09-14

## 2020-07-02 RX ORDER — THIAMINE MONONITRATE (VIT B1) 100 MG
100 TABLET ORAL DAILY
COMMUNITY
Start: 2020-06-23 | End: 2020-07-02 | Stop reason: SDUPTHER

## 2020-07-02 RX ORDER — LEVETIRACETAM 250 MG/1
250 TABLET ORAL 2 TIMES DAILY
Qty: 60 TABLET | Refills: 0 | Status: SHIPPED | OUTPATIENT
Start: 2020-07-02 | End: 2020-08-12

## 2020-07-02 NOTE — PROGRESS NOTES
INTERNAL MEDICINE TRANSITION OF CARE OFFICE VISIT  [facility] [address] [place] [address]    NAME: Teodoro Elder  AGE: 80 y o  SEX: male  : 1927     DATE: 2020     Assessment and Plan:     Problem List Items Addressed This Visit        Cardiovascular and Mediastinum    Hypertension       Blood pressure in the office today is 108/54  CAD (coronary artery disease)       Musculoskeletal and Integument    Acute gout involving toe of left foot       The patient was treated for gout while he was hospitalized  He currently is finishing up his dosing of prednisone  This was his 1st episode of gout  I did review with the patient dietary modifications and written information was provided to the patient  They were instructed to contact the office should he have a recurrence of this gout  The patient does take ibuprofen p r n  For arthritis pain and they were instructed that at the 1st sign of gout he should take ibuprofen to see if this relieves it the inflammation  I did explain to the patient and family member that there are maintenance medications as well as medications to give to a patient for an acute attack  Other    Seizure ([address] ) - Primary       The patient was recently hospitalized and had a witnessed seizure while in his hospital room  This was witnessed by his neurologist   The patient has never had seizures before  He was placed on Keppra  They have not yet followed up with the neurologist at [facility] as they contacted the office to make an appointment and were informed that they are closed due to the coronavirus outbreak  I did refer the patient to a [facility] neurologist for follow-up  The patient has not had any seizures since his discharge           Relevant Medications    levETIRAcetam (KEPPRA) 250 mg tablet    thiamine (VITAMIN B1) 100 mg tablet    Other Relevant Orders    Ambulatory referral to Neurology Transitional Care Management Review:     Jc Baker is a 80 y o  male here for TCM follow-up    During the TCM phone call patient stated:    TCM Call (since 6/1/2020)     Date and time call was made  6/26/2020  3:25 PM    Hospital care reviewed  Records reviewed    Patient was hospitialized at  [facility]    Date of Admission  06/09/20    Date of discharge  06/23/20    Diagnosis  Impaired mobility      Disposition  Home    Were the patients medications reviewed and updated  Yes <img src='C:FILES (X86)    Current Symptoms  None; Swelling      TCM Call (since 6/1/2020)     Clinical progress swelling  Improving    Post hospital issues  None    Should patient be enrolled in anticoag monitoring? No    Scheduled for follow up? Yes <img src='C:FILES (X86)    Did you obtain your prescribed medications  No    Why were you unable to obtain your medications  daugther helps    Do you need help managing your prescriptions or medications  Yes    Why type of assitance do you need  daugther helps    Is transportation to your appointment needed  Yes    Specify why  daugther drives    I have advised the patient to call PCP with any new or worsening symptoms  [facility]  Family members;  Alone    Support System  Family    The type of support provided  Emotional; Physical; Financial    Do you have social support  Yes, as much as I need    Are you recieving any outpatient services  Yes    What type of services  stan at home    Are you recieving home care services  Yes    Types of home care services  Nurse visit <img src='C:FILES (X86)    Are you using any community resources  No    Current waiver services  No    Have you fallen in the last 12 months  Yes    How many times  1x w/ injury    Interperter language line needed  No    Counseling  Family    Counseling topics  Importance of RX compliance           HPI:     Víctor   Returns to the office today for transitional care management visit  He was discharged from [facility] on 06/23  He did have a witnessed seizure and has since been placed on Keppra  This was his 1st seizure  I did refer them to a  [facility] neurologist for follow-up as the [facility] neurologist office is not open due to the coronavirus outbreak  He continues to follow with nephrology  He does have an upcoming appointment  The patient has 3 additional days of prednisone for his acute gout  This was his 1st episode of gout  His uric acid level was normal   The patient was instructed that he should follow-up in the office in 3 months with Dr Marquis Marx  at which time an annual wellness visit will be performed  The following portions of the patient's history were reviewed and updated as appropriate: allergies, current medications, past family history, past medical history, past social history, past surgical history and problem list      Review of Systems:     Review of Systems   Constitutional: Negative  Negative for fatigue  HENT: Negative  Negative for congestion, postnasal drip, rhinorrhea and trouble swallowing  Eyes: Negative  Negative for visual disturbance  Respiratory: Negative  Negative for choking and shortness of breath  Cardiovascular: Negative  Negative for chest pain  Gastrointestinal: Negative  Endocrine: Negative  Genitourinary: Negative  Musculoskeletal: Positive for gait problem (in wheelchair  Walks with assistive device)  Negative for arthralgias, back pain, myalgias and neck pain  Skin: Negative  Neurological: Negative for dizziness, seizures and headaches  Psychiatric/Behavioral: Negative           Problem List:     Patient Active Problem List   Diagnosis    Hypertension    Hyperlipidemia    CAD (coronary artery disease)    Benign prostatic hyperplasia    Actinic keratosis    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, or chronic kidney disease ([facility] [address] )    Seizure ([facility] )    Acute gout involving toe of left foot        Objective:     /54 (BP Location: Left arm, Patient Position: Sitting, Cuff Size: Standard)   Pulse 80   Temp 98 °F (36 7 °C) (Temporal)   Resp 12   Ht 5' 3 75" (1 619 m)   Wt 69 6 kg (153 lb 6 4 oz)   BMI 26 54 kg/m²     Physical Exam   Constitutional: He is oriented to person, place, and time  He appears well-developed and well-nourished  No distress  HENT:   Head: Normocephalic and atraumatic  Eyes: Pupils are equal, round, and reactive to light  EOM are normal    Neck: Normal range of motion  Cardiovascular: Normal rate, regular rhythm and normal heart sounds  No murmur heard  Pulmonary/Chest: Effort normal and breath sounds normal  No respiratory distress  He has no wheezes  Musculoskeletal: Normal range of motion  Neurological: He is alert and oriented to person, place, and time  Skin: Skin is warm and dry  Psychiatric: He has a normal mood and affect  His behavior is normal  Judgment and thought content normal        Laboratory Results: I have personally reviewed the pertinent laboratory results/reports     Radiology/Other Diagnostic Testing Results: I have personally reviewed pertinent reports  Xr Abdomen Obstruction Series    Result Date: 1/28/2020  OBSTRUCTION SERIES INDICATION:   K59 00: Constipation, unspecified I10: Essential (primary) hypertension Z13 31: Encounter for screening for depression I13 0: 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, or unspecified chronic kidney disease  COMPARISON:  None EXAM PERFORMED/VIEWS:  XR ABDOMEN OBSTRUCTION SERIES FINDINGS: There is a nonobstructive bowel gas pattern  No free air beneath the hemidiaphragms    Rounded calcification seen in the right upper quadrant, may represent a gallstone Bone island seen in the left proximal femur Sclerotic density in the left femoral neck suggestive bone island Degenerative changes seen in the lumbar spine Examination of the chest reveals a normal cardiomediastinal silhouette  Lungs are clear  Surgical wires are seen projecting in the right lower quadrant     Nonobstructive bowel gas pattern and moderate amount of fecal debris in the rectum A rounded 1 5 cm density seen in the right upper quadrant, suspect gallstone if if needed this can be definitely evaluated with ultrasound The study was marked in EPIC for significant notification  Workstation performed: [id number]        Current Medications:     Outpatient Medications Prior to Visit   Medication Sig Dispense Refill    aspirin (ECOTRIN) 325 mg EC tablet Take 1 tablet by mouth daily      lisinopril (ZESTRIL) 10 mg tablet Take 10 mg by mouth daily      pantoprazole (PROTONIX) 40 mg tablet Take 40 mg by mouth daily      predniSONE 10 mg tablet 4 tab x 4 days, 3 tabs x 3 days, 2 tabs x 2 days, 1 tab x 1 day then stop 30 tablet 0    simvastatin (ZOCOR) 40 mg tablet Take 40 mg by mouth daily      tamsulosin (FLOMAX) 0 4 mg Take 0 4 mg by mouth daily      THIAMINE HCL PO Take 100 mg by mouth daily      TOPROL XL 25 MG 24 hr tablet Take 25 mg by mouth daily       levETIRAcetam (KEPPRA) 250 mg tablet Take 250 mg by mouth 2 (two) times a day      thiamine (VITAMIN B1) 100 mg tablet Take 100 mg by mouth daily      lactulose (CEPHULAC) 10 g packet Take 1 packet (10 g total) by mouth 3 (three) times a day (Patient not taking: Reported on 7/2/2020) 30 each 0    Polyethylene Glycol 3350 (MIRALAX PO) Take by mouth as needed         No facility-administered medications prior to visit          JESSICA Swartz  [facility] [address] [place] [address]

## 2020-07-02 NOTE — ASSESSMENT & PLAN NOTE
The patient was treated for gout while he was hospitalized  He currently is finishing up his dosing of prednisone  This was his 1st episode of gout  I did review with the patient dietary modifications and written information was provided to the patient  They were instructed to contact the office should he have a recurrence of this gout  The patient does take ibuprofen p r n  For arthritis pain and they were instructed that at the 1st sign of gout he should take ibuprofen to see if this relieves it the inflammation  I did explain to the patient and family member that there are maintenance medications as well as medications to give to a patient for an acute attack

## 2020-07-02 NOTE — ASSESSMENT & PLAN NOTE
The patient was recently hospitalized and had a witnessed seizure while in his hospital room  This was witnessed by his neurologist   The patient has never had seizures before  He was placed on Keppra  They have not yet followed up with the neurologist at [facility] as they contacted the office to make an appointment and were informed that they are closed due to the coronavirus outbreak  I did refer the patient to a [facility] neurologist for follow-up  The patient has not had any seizures since his discharge

## 2020-07-28 ENCOUNTER — TELEPHONE (OUTPATIENT)
Dept: INTERNAL MEDICINE CLINIC | Facility: CLINIC | Age: 85
End: 2020-07-28

## 2020-08-05 ENCOUNTER — APPOINTMENT (OUTPATIENT)
Dept: LAB | Facility: HOSPITAL | Age: 85
End: 2020-08-05
Payer: MEDICARE

## 2020-08-05 ENCOUNTER — TRANSCRIBE ORDERS (OUTPATIENT)
Dept: ADMINISTRATIVE | Facility: HOSPITAL | Age: 85
End: 2020-08-05

## 2020-08-05 DIAGNOSIS — R94.4 ABNORMAL RENAL FUNCTION TEST: Primary | ICD-10-CM

## 2020-08-05 DIAGNOSIS — R94.4 ABNORMAL RENAL FUNCTION TEST: ICD-10-CM

## 2020-08-05 LAB
ANION GAP SERPL CALCULATED.3IONS-SCNC: 9 MMOL/L (ref 4–13)
BUN SERPL-MCNC: 26 MG/DL (ref 5–25)
CALCIUM SERPL-MCNC: 9.1 MG/DL (ref 8.3–10.1)
CHLORIDE SERPL-SCNC: 93 MMOL/L (ref 100–108)
CO2 SERPL-SCNC: 25 MMOL/L (ref 21–32)
CREAT SERPL-MCNC: 1.64 MG/DL (ref 0.6–1.3)
GFR SERPL CREATININE-BSD FRML MDRD: 36 ML/MIN/1.73SQ M
GLUCOSE P FAST SERPL-MCNC: 147 MG/DL (ref 65–99)
POTASSIUM SERPL-SCNC: 5.4 MMOL/L (ref 3.5–5.3)
SODIUM SERPL-SCNC: 127 MMOL/L (ref 136–145)

## 2020-08-05 PROCEDURE — 80048 BASIC METABOLIC PNL TOTAL CA: CPT

## 2020-08-05 PROCEDURE — 36415 COLL VENOUS BLD VENIPUNCTURE: CPT

## 2020-08-06 ENCOUNTER — TELEPHONE (OUTPATIENT)
Dept: NEUROLOGY | Facility: CLINIC | Age: 85
End: 2020-08-06

## 2020-08-10 ENCOUNTER — TELEPHONE (OUTPATIENT)
Dept: NEUROLOGY | Facility: CLINIC | Age: 85
End: 2020-08-10

## 2020-08-12 DIAGNOSIS — R56.9 SEIZURE (HCC): ICD-10-CM

## 2020-08-12 RX ORDER — LEVETIRACETAM 250 MG/1
TABLET ORAL
Qty: 60 TABLET | Refills: 5 | Status: SHIPPED | OUTPATIENT
Start: 2020-08-12 | End: 2020-08-14

## 2020-08-14 DIAGNOSIS — R56.9 SEIZURE (HCC): ICD-10-CM

## 2020-08-14 RX ORDER — LEVETIRACETAM 250 MG/1
TABLET ORAL
Qty: 60 TABLET | Refills: 5 | Status: SHIPPED | OUTPATIENT
Start: 2020-08-14 | End: 2020-09-28 | Stop reason: SDUPTHER

## 2020-08-21 ENCOUNTER — TRANSCRIBE ORDERS (OUTPATIENT)
Dept: ADMINISTRATIVE | Facility: HOSPITAL | Age: 85
End: 2020-08-21

## 2020-08-21 ENCOUNTER — APPOINTMENT (OUTPATIENT)
Dept: LAB | Facility: HOSPITAL | Age: 85
End: 2020-08-21
Payer: MEDICARE

## 2020-08-21 DIAGNOSIS — N18.30 STAGE 3 CHRONIC KIDNEY DISEASE (HCC): Primary | ICD-10-CM

## 2020-08-21 DIAGNOSIS — N18.30 STAGE 3 CHRONIC KIDNEY DISEASE (HCC): ICD-10-CM

## 2020-08-21 DIAGNOSIS — E87.1 HYPONATREMIA: ICD-10-CM

## 2020-08-21 LAB
ANION GAP SERPL CALCULATED.3IONS-SCNC: 9 MMOL/L (ref 4–13)
BUN SERPL-MCNC: 20 MG/DL (ref 5–25)
CALCIUM SERPL-MCNC: 9.2 MG/DL (ref 8.3–10.1)
CHLORIDE SERPL-SCNC: 99 MMOL/L (ref 100–108)
CO2 SERPL-SCNC: 26 MMOL/L (ref 21–32)
CREAT SERPL-MCNC: 1.46 MG/DL (ref 0.6–1.3)
GFR SERPL CREATININE-BSD FRML MDRD: 41 ML/MIN/1.73SQ M
GLUCOSE SERPL-MCNC: 142 MG/DL (ref 65–140)
POTASSIUM SERPL-SCNC: 5 MMOL/L (ref 3.5–5.3)
SODIUM SERPL-SCNC: 134 MMOL/L (ref 136–145)

## 2020-08-21 PROCEDURE — 36415 COLL VENOUS BLD VENIPUNCTURE: CPT

## 2020-08-21 PROCEDURE — 80048 BASIC METABOLIC PNL TOTAL CA: CPT

## 2020-09-04 ENCOUNTER — TELEPHONE (OUTPATIENT)
Dept: INTERNAL MEDICINE CLINIC | Facility: CLINIC | Age: 85
End: 2020-09-04

## 2020-09-12 DIAGNOSIS — R56.9 SEIZURE (HCC): ICD-10-CM

## 2020-09-14 RX ORDER — THIAMINE MONONITRATE (VIT B1) 100 MG
TABLET ORAL
Qty: 90 TABLET | Refills: 3 | Status: SHIPPED | OUTPATIENT
Start: 2020-09-14 | End: 2021-07-18

## 2020-09-28 RX ORDER — LEVETIRACETAM 250 MG/1
250 TABLET ORAL 2 TIMES DAILY
Qty: 180 TABLET | Refills: 3 | Status: SHIPPED | OUTPATIENT
Start: 2020-09-28 | End: 2021-09-08

## 2020-10-08 ENCOUNTER — TELEPHONE (OUTPATIENT)
Dept: NEUROLOGY | Facility: CLINIC | Age: 85
End: 2020-10-08

## 2020-10-13 ENCOUNTER — OFFICE VISIT (OUTPATIENT)
Dept: NEUROLOGY | Facility: CLINIC | Age: 85
End: 2020-10-13
Payer: MEDICARE

## 2020-10-13 VITALS
TEMPERATURE: 97.9 F | DIASTOLIC BLOOD PRESSURE: 60 MMHG | BODY MASS INDEX: 27.11 KG/M2 | WEIGHT: 153 LBS | HEART RATE: 85 BPM | SYSTOLIC BLOOD PRESSURE: 120 MMHG | HEIGHT: 63 IN

## 2020-10-13 DIAGNOSIS — R56.9 SEIZURE (HCC): ICD-10-CM

## 2020-10-13 PROCEDURE — 99204 OFFICE O/P NEW MOD 45 MIN: CPT | Performed by: PSYCHIATRY & NEUROLOGY

## 2020-10-15 ENCOUNTER — OFFICE VISIT (OUTPATIENT)
Dept: INTERNAL MEDICINE CLINIC | Facility: CLINIC | Age: 85
End: 2020-10-15
Payer: MEDICARE

## 2020-10-15 VITALS
HEART RATE: 80 BPM | DIASTOLIC BLOOD PRESSURE: 74 MMHG | TEMPERATURE: 97.3 F | SYSTOLIC BLOOD PRESSURE: 126 MMHG | WEIGHT: 155.4 LBS | BODY MASS INDEX: 27.53 KG/M2

## 2020-10-15 DIAGNOSIS — I10 ESSENTIAL HYPERTENSION: ICD-10-CM

## 2020-10-15 DIAGNOSIS — R56.9 SEIZURE (HCC): ICD-10-CM

## 2020-10-15 DIAGNOSIS — I13.0 HYPERTENSIVE HEART AND CHRONIC KIDNEY DISEASE WITH HEART FAILURE AND STAGE 1 THROUGH STAGE 4 CHRONIC KIDNEY DISEASE, OR CHRONIC KIDNEY DISEASE (HCC): ICD-10-CM

## 2020-10-15 DIAGNOSIS — Z00.00 MEDICARE ANNUAL WELLNESS VISIT, SUBSEQUENT: Primary | ICD-10-CM

## 2020-10-15 DIAGNOSIS — Z23 ENCOUNTER FOR IMMUNIZATION: ICD-10-CM

## 2020-10-15 PROCEDURE — 90662 IIV NO PRSV INCREASED AG IM: CPT | Performed by: INTERNAL MEDICINE

## 2020-10-15 PROCEDURE — G0009 ADMIN PNEUMOCOCCAL VACCINE: HCPCS | Performed by: INTERNAL MEDICINE

## 2020-10-15 PROCEDURE — G0438 PPPS, INITIAL VISIT: HCPCS | Performed by: INTERNAL MEDICINE

## 2020-10-15 PROCEDURE — G0008 ADMIN INFLUENZA VIRUS VAC: HCPCS | Performed by: INTERNAL MEDICINE

## 2020-10-15 PROCEDURE — 90732 PPSV23 VACC 2 YRS+ SUBQ/IM: CPT | Performed by: INTERNAL MEDICINE

## 2020-10-15 PROCEDURE — 99214 OFFICE O/P EST MOD 30 MIN: CPT | Performed by: INTERNAL MEDICINE

## 2020-10-15 RX ORDER — SODIUM CHLORIDE 1000 MG
1 TABLET, SOLUBLE MISCELLANEOUS 2 TIMES DAILY
COMMUNITY
Start: 2020-08-10 | End: 2020-10-15 | Stop reason: CLARIF

## 2020-12-11 ENCOUNTER — LAB (OUTPATIENT)
Dept: LAB | Facility: HOSPITAL | Age: 85
End: 2020-12-11
Payer: MEDICARE

## 2020-12-11 ENCOUNTER — TRANSCRIBE ORDERS (OUTPATIENT)
Dept: ADMINISTRATIVE | Facility: HOSPITAL | Age: 85
End: 2020-12-11

## 2020-12-11 DIAGNOSIS — IMO0002 HYPOTONIC DISORDER: ICD-10-CM

## 2020-12-11 DIAGNOSIS — N18.30 STAGE 3 CHRONIC KIDNEY DISEASE, UNSPECIFIED WHETHER STAGE 3A OR 3B CKD (HCC): ICD-10-CM

## 2020-12-11 DIAGNOSIS — IMO0002 HYPOTONIC DISORDER: Primary | ICD-10-CM

## 2020-12-11 LAB
ANION GAP SERPL CALCULATED.3IONS-SCNC: 11 MMOL/L (ref 4–13)
BUN SERPL-MCNC: 30 MG/DL (ref 5–25)
CALCIUM SERPL-MCNC: 9.3 MG/DL (ref 8.3–10.1)
CHLORIDE SERPL-SCNC: 102 MMOL/L (ref 100–108)
CO2 SERPL-SCNC: 23 MMOL/L (ref 21–32)
CREAT SERPL-MCNC: 1.69 MG/DL (ref 0.6–1.3)
GFR SERPL CREATININE-BSD FRML MDRD: 34 ML/MIN/1.73SQ M
GLUCOSE P FAST SERPL-MCNC: 133 MG/DL (ref 65–99)
POTASSIUM SERPL-SCNC: 5.4 MMOL/L (ref 3.5–5.3)
SODIUM SERPL-SCNC: 136 MMOL/L (ref 136–145)

## 2020-12-11 PROCEDURE — 80048 BASIC METABOLIC PNL TOTAL CA: CPT

## 2020-12-11 PROCEDURE — 36415 COLL VENOUS BLD VENIPUNCTURE: CPT

## 2021-02-17 ENCOUNTER — TRANSCRIBE ORDERS (OUTPATIENT)
Dept: ADMINISTRATIVE | Facility: HOSPITAL | Age: 86
End: 2021-02-17

## 2021-02-17 ENCOUNTER — LAB (OUTPATIENT)
Dept: LAB | Facility: HOSPITAL | Age: 86
End: 2021-02-17
Attending: INTERNAL MEDICINE
Payer: MEDICARE

## 2021-02-17 DIAGNOSIS — R94.4 NONSPECIFIC ABNORMAL RESULTS OF KIDNEY FUNCTION STUDY: ICD-10-CM

## 2021-02-17 DIAGNOSIS — I13.0 HYPERTENSIVE HEART AND CHRONIC KIDNEY DISEASE WITH HEART FAILURE AND STAGE 1 THROUGH STAGE 4 CHRONIC KIDNEY DISEASE, OR CHRONIC KIDNEY DISEASE (HCC): ICD-10-CM

## 2021-02-17 DIAGNOSIS — R94.4 NONSPECIFIC ABNORMAL RESULTS OF KIDNEY FUNCTION STUDY: Primary | ICD-10-CM

## 2021-02-17 LAB
ANION GAP SERPL CALCULATED.3IONS-SCNC: 10 MMOL/L (ref 4–13)
BUN SERPL-MCNC: 28 MG/DL (ref 5–25)
CALCIUM SERPL-MCNC: 8.9 MG/DL (ref 8.3–10.1)
CHLORIDE SERPL-SCNC: 103 MMOL/L (ref 100–108)
CO2 SERPL-SCNC: 25 MMOL/L (ref 21–32)
CREAT SERPL-MCNC: 1.76 MG/DL (ref 0.6–1.3)
ERYTHROCYTE [DISTWIDTH] IN BLOOD BY AUTOMATED COUNT: 13.2 % (ref 11.6–15.1)
GFR SERPL CREATININE-BSD FRML MDRD: 33 ML/MIN/1.73SQ M
GLUCOSE P FAST SERPL-MCNC: 79 MG/DL (ref 65–99)
HCT VFR BLD AUTO: 32.6 % (ref 36.5–49.3)
HGB BLD-MCNC: 10.7 G/DL (ref 12–17)
MCH RBC QN AUTO: 33.5 PG (ref 26.8–34.3)
MCHC RBC AUTO-ENTMCNC: 32.8 G/DL (ref 31.4–37.4)
MCV RBC AUTO: 102 FL (ref 82–98)
PLATELET # BLD AUTO: 202 THOUSANDS/UL (ref 149–390)
PMV BLD AUTO: 9.5 FL (ref 8.9–12.7)
POTASSIUM SERPL-SCNC: 5.5 MMOL/L (ref 3.5–5.3)
RBC # BLD AUTO: 3.19 MILLION/UL (ref 3.88–5.62)
SODIUM SERPL-SCNC: 138 MMOL/L (ref 136–145)
WBC # BLD AUTO: 7.88 THOUSAND/UL (ref 4.31–10.16)

## 2021-02-17 PROCEDURE — 85027 COMPLETE CBC AUTOMATED: CPT

## 2021-02-17 PROCEDURE — 80048 BASIC METABOLIC PNL TOTAL CA: CPT

## 2021-02-17 PROCEDURE — 36415 COLL VENOUS BLD VENIPUNCTURE: CPT

## 2021-04-16 ENCOUNTER — OFFICE VISIT (OUTPATIENT)
Dept: INTERNAL MEDICINE CLINIC | Facility: CLINIC | Age: 86
End: 2021-04-16
Payer: MEDICARE

## 2021-04-16 VITALS
OXYGEN SATURATION: 98 % | WEIGHT: 161 LBS | DIASTOLIC BLOOD PRESSURE: 76 MMHG | SYSTOLIC BLOOD PRESSURE: 124 MMHG | HEIGHT: 63 IN | HEART RATE: 74 BPM | BODY MASS INDEX: 28.53 KG/M2 | TEMPERATURE: 98.6 F

## 2021-04-16 DIAGNOSIS — D53.9 MACROCYTIC ANEMIA: ICD-10-CM

## 2021-04-16 DIAGNOSIS — E87.5 HYPERKALEMIA: ICD-10-CM

## 2021-04-16 DIAGNOSIS — E55.9 VITAMIN D DEFICIENCY: ICD-10-CM

## 2021-04-16 DIAGNOSIS — I13.0 HYPERTENSIVE HEART AND CHRONIC KIDNEY DISEASE WITH HEART FAILURE AND STAGE 1 THROUGH STAGE 4 CHRONIC KIDNEY DISEASE, OR CHRONIC KIDNEY DISEASE (HCC): Primary | ICD-10-CM

## 2021-04-16 DIAGNOSIS — R56.9 SEIZURE (HCC): ICD-10-CM

## 2021-04-16 PROCEDURE — 99214 OFFICE O/P EST MOD 30 MIN: CPT | Performed by: INTERNAL MEDICINE

## 2021-04-16 RX ORDER — SODIUM CHLORIDE 1000 MG
TABLET, SOLUBLE MISCELLANEOUS
COMMUNITY
End: 2021-12-10 | Stop reason: CLARIF

## 2021-04-16 NOTE — PATIENT INSTRUCTIONS
Chronic Kidney Disease   WHAT YOU NEED TO KNOW:   Chronic kidney disease (CKD) is the gradual and permanent loss of kidney function  It is also called chronic kidney failure, or chronic renal insufficiency  Normally, the kidneys remove fluid, chemicals, and waste from your blood  These wastes are turned into urine by your kidneys  CKD may worsen over time and lead to kidney failure  Your CKD team will help you and your family plan for your care at home  The team will help you create goals and find ways to meet your goals  Your care plan may change over time as your needs change  DISCHARGE INSTRUCTIONS:   Call your local emergency number (911 in the [address]) if:   · You have a seizure  · You have shortness of breath  Call your doctor or nephrologist if:   · You are confused and very drowsy  · You suddenly gain or lose more weight than your healthcare provider has told you is okay  · You have itchy skin or a rash  · You urinate more or less than you normally do  · You have blood in your urine  · You have nausea and are vomiting  · You have fatigue or muscle weakness  · You have hiccups that will not stop  · You have questions or concerns about your condition or care  Medicines:   · Medicines  may be given to decrease blood pressure and get rid of extra fluid  You may also receive medicine to manage health conditions that may occur with CKD, such as anemia, diabetes, and heart disease  · Take your medicine as directed  Contact your healthcare provider if you think your medicine is not helping or if you have side effects  Tell him or her if you are allergic to any medicine  Keep a list of the medicines, vitamins, and herbs you take  Include the amounts, and when and why you take them  Bring the list or the pill bottles to follow-up visits  Carry your medicine list with you in case of an emergency  What you can do to manage CKD: Management may include making some lifestyle changes  Tell your healthcare provider if you have any concerns about being able to make the changes  He or she can help you find solutions, including working with specialists  Ask for help creating a plan to break large goals into smaller steps  Your plan may include any of the following:  · Manage other health conditions  Your healthcare provider will work with you to make a care plan that meets your needs  You will be checked regularly for heart disease or other conditions that can make CKD worse, such as diabetes  Your blood pressure will be closely monitored  You will also get a target blood pressure and help making a plan to reach your target  This may include taking your blood pressure at home  · Maintain a healthy weight  Extra weight can strain your kidneys  Ask what a healthy weight is for you  Your provider can help you create a weight loss plan if you are overweight  · Create an exercise plan  Regular exercise can help you manage CKD, high blood pressure, and diabetes  Exercise also helps control weight  Your provider can help you create exercise goals and a plan to reach those goals  For example, your goal may be to exercise for 30 minutes in a day  Your plan can include breaking exercise into 10 minute sessions, 3 times during the day  · Create a healthy eating plan  Your provider may tell you to eat food low in sodium (salt), potassium, phosphorus, or protein  A dietitian can help you plan meals if needed  Ask how much liquid to drink each day and which liquids are best for you  · Limit alcohol as directed  Alcohol can cause fluid retention and can affect your kidneys  Ask how much alcohol is safe for you  A drink of alcohol is 12 ounces of beer, 5 ounces of wine, or 1½ ounces of liquor  · Do not smoke  Nicotine and other chemicals in cigarettes and cigars can cause kidney damage  Ask your provider for information if you currently smoke and need help to quit   E-cigarettes or smokeless tobacco still contain nicotine  Talk to your provider before you use these products  · Ask about over-the-counter medicines  Medicines such as NSAIDs and laxatives may harm your kidneys  Some cough and cold medicines can raise your blood pressure  Always ask if a medicine is safe before you take it  · Ask about vaccines you may need  Infections such as pneumonia, influenza, and hepatitis can be more harmful or more likely to occur in a person who has CKD  Vaccines lower your risk for infection  Follow up with your doctor as directed: You will need to return for tests to monitor your kidney and nerve function, and your parathyroid hormone level  Your medicines may be changed, based on certain test results  You may also be referred to a nephrologist (kidney specialist)  Write down your questions so you remember to ask them during your visits  © Copyright [address] Information is for End User's use only and may not be sold, redistributed or otherwise used for commercial purposes  All illustrations and images included in CareNotes® are the copyrighted property of A D A M , Inc  or [facility] Manuel Mireles   The above information is an  only  It is not intended as medical advice for individual conditions or treatments  Talk to your doctor, nurse or pharmacist before following any medical regimen to see if it is safe and effective for you

## 2021-04-16 NOTE — PROGRESS NOTES
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, or chronic kidney disease ([facility] )    Renal function remains stable  He is euvolemic on exam  No worsening cardiac symptoms  Will monitor closely and check labs in 2 months  - Basic metabolic panel; Future  - PTH, intact; Future  - Phosphorus; Future    2  Hyperkalemia    Need to watch closely  May have to discontinue lisinopril  Get appropriate hydration and avoid excess potassium containing foods  3  Macrocytic anemia    Will repeat levels in 2 months  Denies any blood loss  - Folate; Future  - Vitamin B12; Future  - Methylmalonic acid, serum; Future  - CBC and differential; Future    4  Seizure ([facility] )    Followed by neurology  Stable on keppra  5  Vitamin D deficiency  - Vitamin D 25 hydroxy; Future    BMI Counseling: Body mass index is 28 52 kg/m²  The BMI is above normal  Nutrition recommendations include encouraging healthy choices of fruits and vegetables, limiting drinks that contain sugar and moderation in carbohydrate intake  Return in about 4 months (around 2021) for Follow-up  Chief Complaint:     Chief Complaint   Patient presents with    Follow-up     labs        History of Present Illness:     Patient presents for follow-up  No big changes with his health  Was seeing outside nephrologist but very unhappy with office after a mix up and won't go there anymore  Recent renal function is stable but has had some problems with potassium in 5 4-5 5 range  He feels fine and has no complaints  Hgb also down a little and has high MCV  Taking vitamin B1 and sodium tablets as he has been prescribed       Review of Systems:     Review of Systems   Constitutional: Negative for activity change, appetite change and fatigue  Respiratory: Negative for apnea, cough, chest tightness, shortness of breath and wheezing  Cardiovascular: Negative for chest pain, palpitations and leg swelling  Gastrointestinal: Negative for abdominal distention, abdominal pain, blood in stool, constipation, diarrhea, nausea and vomiting  Neurological: Negative for dizziness, weakness, light-headedness, numbness and headaches  Psychiatric/Behavioral: Negative for behavioral problems, confusion, hallucinations, sleep disturbance and suicidal ideas  The patient is not nervous/anxious  Objective:     /76 (BP Location: Right arm, Patient Position: Sitting, Cuff Size: Standard)   Pulse 74   Temp 98 6 °F (37 °C)   Ht 5' 3" (1 6 m)   Wt 73 kg (161 lb)   SpO2 98%   BMI 28 52 kg/m²     Physical Exam  Vitals signs reviewed  Constitutional:       General: He is not in acute distress  Appearance: He is well-developed  He is not diaphoretic  Eyes:      General: No scleral icterus  Right eye: No discharge  Left eye: No discharge  Conjunctiva/sclera: Conjunctivae normal    Neck:      Musculoskeletal: Neck supple  Thyroid: No thyromegaly  Vascular: No JVD  Cardiovascular:      Rate and Rhythm: Normal rate and regular rhythm  Heart sounds: Normal heart sounds  No murmur  Pulmonary:      Effort: Pulmonary effort is normal  No respiratory distress  Breath sounds: Normal breath sounds  No wheezing or rales  Abdominal:      General: Bowel sounds are normal  There is no distension  Palpations: Abdomen is soft  Tenderness: There is no abdominal tenderness  Musculoskeletal:      Right lower leg: No edema  Left lower leg: No edema  Lymphadenopathy:      Cervical: No cervical adenopathy  Skin:     General: Skin is warm and dry  Neurological:      Mental Status: He is alert     Psychiatric:         Mood and Affect: Mood normal          Behavior: Behavior normal          Sherri Reasoner, [facility] [address]

## 2021-06-21 ENCOUNTER — APPOINTMENT (OUTPATIENT)
Dept: LAB | Facility: HOSPITAL | Age: 86
End: 2021-06-21
Attending: INTERNAL MEDICINE
Payer: MEDICARE

## 2021-06-21 DIAGNOSIS — D53.9 MACROCYTIC ANEMIA: ICD-10-CM

## 2021-06-21 DIAGNOSIS — E55.9 VITAMIN D DEFICIENCY: ICD-10-CM

## 2021-06-21 DIAGNOSIS — I13.0 HYPERTENSIVE HEART AND CHRONIC KIDNEY DISEASE WITH HEART FAILURE AND STAGE 1 THROUGH STAGE 4 CHRONIC KIDNEY DISEASE, OR CHRONIC KIDNEY DISEASE (HCC): ICD-10-CM

## 2021-06-21 LAB
25(OH)D3 SERPL-MCNC: 13.8 NG/ML (ref 30–100)
ANION GAP SERPL CALCULATED.3IONS-SCNC: 10 MMOL/L (ref 4–13)
BASOPHILS # BLD AUTO: 0.07 THOUSANDS/ΜL (ref 0–0.1)
BASOPHILS NFR BLD AUTO: 1 % (ref 0–1)
BUN SERPL-MCNC: 34 MG/DL (ref 5–25)
CALCIUM SERPL-MCNC: 8.9 MG/DL (ref 8.3–10.1)
CHLORIDE SERPL-SCNC: 101 MMOL/L (ref 100–108)
CO2 SERPL-SCNC: 23 MMOL/L (ref 21–32)
CREAT SERPL-MCNC: 1.91 MG/DL (ref 0.6–1.3)
EOSINOPHIL # BLD AUTO: 0.77 THOUSAND/ΜL (ref 0–0.61)
EOSINOPHIL NFR BLD AUTO: 12 % (ref 0–6)
ERYTHROCYTE [DISTWIDTH] IN BLOOD BY AUTOMATED COUNT: 13.7 % (ref 11.6–15.1)
FOLATE SERPL-MCNC: 14 NG/ML (ref 3.1–17.5)
GFR SERPL CREATININE-BSD FRML MDRD: 30 ML/MIN/1.73SQ M
GLUCOSE P FAST SERPL-MCNC: 103 MG/DL (ref 65–99)
HCT VFR BLD AUTO: 35.4 % (ref 36.5–49.3)
HGB BLD-MCNC: 11.6 G/DL (ref 12–17)
IMM GRANULOCYTES # BLD AUTO: 0.02 THOUSAND/UL (ref 0–0.2)
IMM GRANULOCYTES NFR BLD AUTO: 0 % (ref 0–2)
LYMPHOCYTES # BLD AUTO: 1.64 THOUSANDS/ΜL (ref 0.6–4.47)
LYMPHOCYTES NFR BLD AUTO: 26 % (ref 14–44)
MCH RBC QN AUTO: 33.2 PG (ref 26.8–34.3)
MCHC RBC AUTO-ENTMCNC: 32.8 G/DL (ref 31.4–37.4)
MCV RBC AUTO: 101 FL (ref 82–98)
MONOCYTES # BLD AUTO: 0.47 THOUSAND/ΜL (ref 0.17–1.22)
MONOCYTES NFR BLD AUTO: 7 % (ref 4–12)
NEUTROPHILS # BLD AUTO: 3.34 THOUSANDS/ΜL (ref 1.85–7.62)
NEUTS SEG NFR BLD AUTO: 54 % (ref 43–75)
NRBC BLD AUTO-RTO: 0 /100 WBCS
PHOSPHATE SERPL-MCNC: 4.2 MG/DL (ref 2.3–4.1)
PLATELET # BLD AUTO: 204 THOUSANDS/UL (ref 149–390)
PMV BLD AUTO: 9.2 FL (ref 8.9–12.7)
POTASSIUM SERPL-SCNC: 5.4 MMOL/L (ref 3.5–5.3)
PTH-INTACT SERPL-MCNC: 53.2 PG/ML (ref 18.4–80.1)
RBC # BLD AUTO: 3.49 MILLION/UL (ref 3.88–5.62)
SODIUM SERPL-SCNC: 134 MMOL/L (ref 136–145)
VIT B12 SERPL-MCNC: 395 PG/ML (ref 100–900)
WBC # BLD AUTO: 6.31 THOUSAND/UL (ref 4.31–10.16)

## 2021-06-21 PROCEDURE — 36415 COLL VENOUS BLD VENIPUNCTURE: CPT

## 2021-06-21 PROCEDURE — 83918 ORGANIC ACIDS TOTAL QUANT: CPT

## 2021-06-21 PROCEDURE — 83970 ASSAY OF PARATHORMONE: CPT

## 2021-06-21 PROCEDURE — 82607 VITAMIN B-12: CPT

## 2021-06-21 PROCEDURE — 82306 VITAMIN D 25 HYDROXY: CPT

## 2021-06-21 PROCEDURE — 84100 ASSAY OF PHOSPHORUS: CPT

## 2021-06-21 PROCEDURE — 80048 BASIC METABOLIC PNL TOTAL CA: CPT

## 2021-06-21 PROCEDURE — 85025 COMPLETE CBC W/AUTO DIFF WBC: CPT

## 2021-06-21 PROCEDURE — 82746 ASSAY OF FOLIC ACID SERUM: CPT

## 2021-06-24 ENCOUNTER — TELEPHONE (OUTPATIENT)
Dept: INTERNAL MEDICINE CLINIC | Facility: CLINIC | Age: 86
End: 2021-06-24

## 2021-06-24 LAB
METHYLMALONATE SERPL-SCNC: 393 NMOL/L (ref 0–378)
SL AMB DISCLAIMER: ABNORMAL

## 2021-06-24 NOTE — TELEPHONE ENCOUNTER
----- Message from giftee Fatuma, DO sent at 6/24/2021  7:19 AM EDT -----  #1 Mild vitamin B12 deficiency  Recommend OTC 500mcg of vitamin B12    #2 Potassium continues to remain slightly high  Lisinopril can cause this  Recommend stopping the lisinopril and will substitute with a medication called amlodipine    #3 Vitamin D levels are low  I will send in prescription to take weekly for 12 weeks   Once that Rx runs out, then he should take OTC vitamin D3 5000 units daily

## 2021-07-18 DIAGNOSIS — R56.9 SEIZURE (HCC): ICD-10-CM

## 2021-07-18 RX ORDER — LANOLIN ALCOHOL/MO/W.PET/CERES
CREAM (GRAM) TOPICAL
Qty: 90 TABLET | Refills: 3 | Status: SHIPPED | OUTPATIENT
Start: 2021-07-18 | End: 2022-03-07 | Stop reason: ALTCHOICE

## 2021-08-31 ENCOUNTER — OFFICE VISIT (OUTPATIENT)
Dept: INTERNAL MEDICINE CLINIC | Facility: CLINIC | Age: 86
End: 2021-08-31
Payer: MEDICARE

## 2021-08-31 VITALS
DIASTOLIC BLOOD PRESSURE: 72 MMHG | HEIGHT: 63 IN | HEART RATE: 78 BPM | WEIGHT: 166.2 LBS | SYSTOLIC BLOOD PRESSURE: 124 MMHG | OXYGEN SATURATION: 97 % | BODY MASS INDEX: 29.45 KG/M2 | TEMPERATURE: 98 F

## 2021-08-31 DIAGNOSIS — I10 ESSENTIAL HYPERTENSION: Primary | ICD-10-CM

## 2021-08-31 DIAGNOSIS — N18.32 STAGE 3B CHRONIC KIDNEY DISEASE (HCC): ICD-10-CM

## 2021-08-31 DIAGNOSIS — I25.10 CORONARY ARTERY DISEASE INVOLVING NATIVE CORONARY ARTERY OF NATIVE HEART WITHOUT ANGINA PECTORIS: ICD-10-CM

## 2021-08-31 PROCEDURE — 99214 OFFICE O/P EST MOD 30 MIN: CPT | Performed by: INTERNAL MEDICINE

## 2021-08-31 RX ORDER — AMLODIPINE BESYLATE 5 MG/1
5 TABLET ORAL DAILY
Qty: 90 TABLET | Refills: 3 | Status: SHIPPED | OUTPATIENT
Start: 2021-08-31 | End: 2022-08-03

## 2021-08-31 NOTE — PROGRESS NOTES
[place]    NAME: Bert Thomson  AGE: 80 y o  SEX: male  : 1927     DATE: 2021     Assessment and Plan:     1  Essential hypertension    BP is stable since switching to amlodipine  Will continue  - amLODIPine (NORVASC) 5 mg tablet; Take 1 tablet (5 mg total) by mouth daily  Dispense: 90 tablet; Refill: 3    2  Stage 3b chronic kidney disease ([facility] )    Continue f/u with nephrology  Stay hydrated  Stopped lisinopril due to hyperkalemia  Recheck BMP today  Avoid nephrotoxins     - Basic metabolic panel; Future  - CBC; Future    3  Coronary artery disease involving native coronary artery of native heart without angina pectoris    Stable w/o angina  Continue current medications as prescribed  - Lipid panel; Future        Return in about 3 months (around 2021) for Subsequent AWV  Chief Complaint:     Chief Complaint   Patient presents with    Follow-up     4 months      History of Present Illness:     Darius Avila presents for follow-up  Denies any changes with his health  Has been feeling well  Lisinopril was previously stopped due to hyperkalemia  Also found to have b12 and D deficiency  Was given prescriptions to start taking supplementation  No recent falls  No cardiac symptoms  No recent ER visits or hospitalizations  Review of Systems:     Review of Systems   Constitutional: Negative for activity change, appetite change and fatigue  Respiratory: Negative for apnea, cough, chest tightness, shortness of breath and wheezing  Cardiovascular: Negative for chest pain, palpitations and leg swelling  Gastrointestinal: Negative for abdominal distention, abdominal pain, blood in stool, constipation, diarrhea, nausea and vomiting  Musculoskeletal: Positive for gait problem  Neurological: Negative for dizziness, weakness, light-headedness, numbness and headaches     Psychiatric/Behavioral: Negative for behavioral problems, confusion, hallucinations, sleep disturbance and suicidal ideas  The patient is not nervous/anxious  Objective:     /72 (BP Location: Left arm, Patient Position: Sitting, Cuff Size: Standard)   Pulse 78   Temp 98 °F (36 7 °C) (Temporal) Comment: no nsaids  Ht 5' 3" (1 6 m)   Wt 75 4 kg (166 lb 3 2 oz)   SpO2 97%   BMI 29 44 kg/m²     Physical Exam  Constitutional:       General: He is not in acute distress  Appearance: He is not ill-appearing  Cardiovascular:      Rate and Rhythm: Normal rate and regular rhythm  Heart sounds: No murmur heard  Pulmonary:      Effort: Pulmonary effort is normal  No respiratory distress  Breath sounds: No wheezing  Abdominal:      General: Bowel sounds are normal  There is no distension  Tenderness: There is no abdominal tenderness  Musculoskeletal:      Right lower leg: No edema  Left lower leg: No edema  Neurological:      Mental Status: He is alert        Gait: Gait abnormal        Toyin Logan DO  MEDICAL [id number]

## 2021-08-31 NOTE — PATIENT INSTRUCTIONS
Chronic Hypertension   AMBULATORY CARE:   Hypertension  is high blood pressure  Your blood pressure is the force of your blood moving against the walls of your arteries  Hypertension causes your blood pressure to get so high that your heart has to work much harder than normal  This can damage your heart  Even if you have hypertension for years, lifestyle changes, medicines, or both can help bring your blood pressure to normal   Call your local emergency number (911 in the [address] [facility]) or have someone call if:   · You have chest pain  · You have any of the following signs of a heart attack:      ? Squeezing, pressure, or pain in your chest    ? You may  also have any of the following:     § Discomfort or pain in your back, neck, jaw, stomach, or arm    § Shortness of breath    § Nausea or vomiting    § Lightheadedness or a sudden cold sweat    · You become confused or have difficulty speaking  · You suddenly feel lightheaded or have trouble breathing  Seek care immediately if:   · You have a severe headache or vision loss  · You have weakness in an arm or leg  Call your doctor if:   · You feel faint, dizzy, confused, or drowsy  · You have been taking your blood pressure medicine but your pressure is higher than your provider says it should be  · You have questions or concerns about your condition or care  Treatment for chronic hypertension  may include medicine to lower your blood pressure and cholesterol levels  A low cholesterol level helps prevent heart disease and makes it easier to control your blood pressure  Heart disease can make your blood pressure harder to control  You may also need to make lifestyle changes  What you need to know about the stages of hypertension:       · Normal blood pressure is 119/79 or lower   Your healthcare provider may only check your blood pressure each year if it stays at a normal level  · Elevated blood pressure is 120/79 to 129/79    This is sometimes called prehypertension  Your healthcare provider may suggest lifestyle changes to help lower your blood pressure to a normal level  He or she may then check it again in 3 to 6 months  · Stage 1 hypertension is 130/80  to 139/89   Your provider may recommend lifestyle changes, medication, and checks every 3 to 6 months until your blood pressure is controlled  · Stage 2 hypertension is 140/90 or higher   Your provider will recommend lifestyle changes and have you take 2 kinds of hypertension medicines  You will also need to have your blood pressure checked monthly until it is controlled  Manage chronic hypertension:   · Check your blood pressure at home  Avoid smoking, caffeine, and exercise at least 30 minutes before checking your blood pressure  Sit and rest for 5 minutes before you take your blood pressure  Extend your arm and support it on a flat surface  Your arm should be at the same level as your heart  Follow the directions that came with your blood pressure monitor  Check your blood pressure 2 times, 1 minute apart, before you take your medicine in the morning  Also check your blood pressure before your evening meal  Keep a record of your readings and bring it to your follow-up visits  Ask your healthcare provider what your blood pressure should be  · Manage any other health conditions you have  Health conditions such as diabetes can increase your risk for hypertension  Follow your healthcare provider's instructions and take all your medicines as directed  Talk to your healthcare provider about any new health conditions you have recently developed  · Ask about all medicines  Certain medicines can increase your blood pressure  Examples include oral birth control pills, decongestants, herbal supplements, and NSAIDs, such as ibuprofen  Your healthcare provider can tell you which medicines are safe for you to take  This includes prescription and over-the-counter medicines      Lifestyle changes you can make to lower your blood pressure: Your provider may want you to make more lifestyle changes if you are having trouble controlling your blood pressure  This may feel difficult over time, especially if you think you are making good changes but your pressure is still high  It might help to focus on one new change at a time  For example, try to add 1 more day of exercise, or exercise for an extra 10 minutes on 2 days  Small changes can make a big difference  Your healthcare provider can also refer you to specialists such as a dietitian who can help you make small changes  Your family members may be included in helping you learn to create lifestyle changes, such as the following:  · Limit sodium (salt) as directed  Too much sodium can affect your fluid balance  Check labels to find low-sodium or no-salt-added foods  Some low-sodium foods use potassium salts for flavor  Too much potassium can also cause health problems  Your healthcare provider will tell you how much sodium and potassium are safe for you to have in a day  He or she may recommend that you limit sodium to 2,300 mg a day  · Follow the meal plan recommended by your healthcare provider  A dietitian or your provider can give you more information on low-sodium plans or the DASH (Dietary Approaches to Stop Hypertension) eating plan  The DASH plan is low in sodium, processed sugar, unhealthy fats, and total fat  It is high in potassium, calcium, and fiber  These can be found in vegetables, fruit, and whole-grain foods  · Be physically active throughout the day  Physical activity, such as exercise, can help control your blood pressure and your weight  Be physically active for at least 30 minutes per day, on most days of the week  Include aerobic activity, such as walking or riding a bicycle  Also include strength training at least 2 times each week  Your healthcare providers can help you create a physical activity plan  · Decrease stress  This may help lower your blood pressure  Learn ways to relax, such as deep breathing or listening to music  · Limit alcohol as directed  Alcohol can increase your blood pressure  A drink of alcohol is 12 ounces of beer, 5 ounces of wine, or 1½ ounces of liquor  · Do not smoke  Nicotine and other chemicals in cigarettes and cigars can increase your blood pressure and also cause lung damage  Ask your healthcare provider for information if you currently smoke and need help to quit  E-cigarettes or smokeless tobacco still contain nicotine  Talk to your healthcare provider before you use these products  Follow up with your doctor as directed: You will need to return to have your blood pressure checked and to have other lab tests done  Write down your questions so you remember to ask them during your visits  © Copyright [employer] 2021 Information is for End User's use only and may not be sold, redistributed or otherwise used for commercial purposes  All illustrations and images included in CareNotes® are the copyrighted property of A D A M , Inc  or [facility] Manuel Mireles   The above information is an  only  It is not intended as medical advice for individual conditions or treatments  Talk to your doctor, nurse or pharmacist before following any medical regimen to see if it is safe and effective for you

## 2021-09-07 DIAGNOSIS — R56.9 SEIZURE (HCC): ICD-10-CM

## 2021-09-08 RX ORDER — LEVETIRACETAM 250 MG/1
TABLET ORAL
Qty: 180 TABLET | Refills: 3 | Status: SHIPPED | OUTPATIENT
Start: 2021-09-08

## 2021-09-10 DIAGNOSIS — E55.9 VITAMIN D DEFICIENCY: ICD-10-CM

## 2021-09-10 RX ORDER — ERGOCALCIFEROL 1.25 MG/1
CAPSULE ORAL
Qty: 4 CAPSULE | Refills: 2 | Status: SHIPPED | OUTPATIENT
Start: 2021-09-10 | End: 2021-12-03

## 2021-09-29 ENCOUNTER — APPOINTMENT (OUTPATIENT)
Dept: LAB | Facility: HOSPITAL | Age: 86
End: 2021-09-29
Attending: INTERNAL MEDICINE
Payer: MEDICARE

## 2021-09-29 DIAGNOSIS — E78.2 MIXED HYPERLIPIDEMIA: ICD-10-CM

## 2021-09-29 DIAGNOSIS — I25.10 CORONARY ARTERY DISEASE INVOLVING NATIVE CORONARY ARTERY WITHOUT ANGINA PECTORIS, UNSPECIFIED WHETHER NATIVE OR TRANSPLANTED HEART: ICD-10-CM

## 2021-09-29 DIAGNOSIS — R73.03 BORDERLINE DIABETES MELLITUS: ICD-10-CM

## 2021-09-29 DIAGNOSIS — I25.10 CORONARY ARTERY DISEASE INVOLVING NATIVE CORONARY ARTERY OF NATIVE HEART WITHOUT ANGINA PECTORIS: ICD-10-CM

## 2021-09-29 DIAGNOSIS — Z95.1 HISTORY OF CORONARY ARTERY BYPASS GRAFT: ICD-10-CM

## 2021-09-29 DIAGNOSIS — N18.30 STAGE 3 CHRONIC KIDNEY DISEASE, UNSPECIFIED WHETHER STAGE 3A OR 3B CKD (HCC): ICD-10-CM

## 2021-09-29 DIAGNOSIS — N18.32 STAGE 3B CHRONIC KIDNEY DISEASE (HCC): ICD-10-CM

## 2021-09-29 LAB
ALBUMIN SERPL BCP-MCNC: 4.3 G/DL (ref 3.5–5)
ALP SERPL-CCNC: 67 U/L (ref 46–116)
ALT SERPL W P-5'-P-CCNC: 21 U/L (ref 12–78)
ANION GAP SERPL CALCULATED.3IONS-SCNC: 12 MMOL/L (ref 4–13)
AST SERPL W P-5'-P-CCNC: 20 U/L (ref 5–45)
BILIRUB SERPL-MCNC: 0.6 MG/DL (ref 0.2–1)
BUN SERPL-MCNC: 24 MG/DL (ref 5–25)
CALCIUM SERPL-MCNC: 9.4 MG/DL (ref 8.3–10.1)
CHLORIDE SERPL-SCNC: 98 MMOL/L (ref 100–108)
CHOLEST SERPL-MCNC: 171 MG/DL (ref 50–200)
CO2 SERPL-SCNC: 25 MMOL/L (ref 21–32)
CREAT SERPL-MCNC: 1.73 MG/DL (ref 0.6–1.3)
ERYTHROCYTE [DISTWIDTH] IN BLOOD BY AUTOMATED COUNT: 13.3 % (ref 11.6–15.1)
EST. AVERAGE GLUCOSE BLD GHB EST-MCNC: 131 MG/DL
GFR SERPL CREATININE-BSD FRML MDRD: 33 ML/MIN/1.73SQ M
GLUCOSE P FAST SERPL-MCNC: 107 MG/DL (ref 65–99)
HBA1C MFR BLD: 6.2 %
HCT VFR BLD AUTO: 35.5 % (ref 36.5–49.3)
HDLC SERPL-MCNC: 91 MG/DL
HGB BLD-MCNC: 11.7 G/DL (ref 12–17)
LDLC SERPL CALC-MCNC: 63 MG/DL (ref 0–100)
MCH RBC QN AUTO: 33.1 PG (ref 26.8–34.3)
MCHC RBC AUTO-ENTMCNC: 33 G/DL (ref 31.4–37.4)
MCV RBC AUTO: 101 FL (ref 82–98)
NONHDLC SERPL-MCNC: 80 MG/DL
PLATELET # BLD AUTO: 263 THOUSANDS/UL (ref 149–390)
PMV BLD AUTO: 9.4 FL (ref 8.9–12.7)
POTASSIUM SERPL-SCNC: 4.5 MMOL/L (ref 3.5–5.3)
PROT SERPL-MCNC: 8 G/DL (ref 6.4–8.2)
RBC # BLD AUTO: 3.53 MILLION/UL (ref 3.88–5.62)
SODIUM SERPL-SCNC: 135 MMOL/L (ref 136–145)
TRIGL SERPL-MCNC: 84 MG/DL
WBC # BLD AUTO: 4.84 THOUSAND/UL (ref 4.31–10.16)

## 2021-09-29 PROCEDURE — 80061 LIPID PANEL: CPT

## 2021-09-29 PROCEDURE — 85027 COMPLETE CBC AUTOMATED: CPT

## 2021-09-29 PROCEDURE — 80053 COMPREHEN METABOLIC PANEL: CPT

## 2021-09-29 PROCEDURE — 36415 COLL VENOUS BLD VENIPUNCTURE: CPT

## 2021-09-29 PROCEDURE — 83036 HEMOGLOBIN GLYCOSYLATED A1C: CPT

## 2021-09-30 ENCOUNTER — TELEPHONE (OUTPATIENT)
Dept: INTERNAL MEDICINE CLINIC | Facility: CLINIC | Age: 86
End: 2021-09-30

## 2021-09-30 NOTE — TELEPHONE ENCOUNTER
----- Message from Daysi Elliott DO sent at 9/30/2021  3:03 PM EDT -----  Labs look good and are stable from previous

## 2021-11-17 ENCOUNTER — APPOINTMENT (OUTPATIENT)
Dept: LAB | Facility: HOSPITAL | Age: 86
End: 2021-11-17
Payer: MEDICARE

## 2021-11-17 DIAGNOSIS — I25.118 ATHEROSCLEROSIS OF NATIVE CORONARY ARTERY WITH OTHER FORM OF ANGINA PECTORIS, UNSPECIFIED WHETHER NATIVE OR TRANSPLANTED HEART (HCC): ICD-10-CM

## 2021-11-17 DIAGNOSIS — E78.2 MIXED HYPERLIPIDEMIA: ICD-10-CM

## 2021-11-17 DIAGNOSIS — R97.20 ELEVATED PROSTATE SPECIFIC ANTIGEN (PSA): Primary | ICD-10-CM

## 2021-11-17 LAB
BASOPHILS # BLD AUTO: 0.05 THOUSANDS/ΜL (ref 0–0.1)
BASOPHILS NFR BLD AUTO: 1 % (ref 0–1)
CHOLEST SERPL-MCNC: 159 MG/DL (ref 50–200)
EOSINOPHIL # BLD AUTO: 0.68 THOUSAND/ΜL (ref 0–0.61)
EOSINOPHIL NFR BLD AUTO: 7 % (ref 0–6)
ERYTHROCYTE [DISTWIDTH] IN BLOOD BY AUTOMATED COUNT: 13.1 % (ref 11.6–15.1)
HCT VFR BLD AUTO: 34.8 % (ref 36.5–49.3)
HDLC SERPL-MCNC: 90 MG/DL
HGB BLD-MCNC: 11.5 G/DL (ref 12–17)
IMM GRANULOCYTES # BLD AUTO: 0.02 THOUSAND/UL (ref 0–0.2)
IMM GRANULOCYTES NFR BLD AUTO: 0 % (ref 0–2)
LDLC SERPL CALC-MCNC: 56 MG/DL (ref 0–100)
LYMPHOCYTES # BLD AUTO: 1.37 THOUSANDS/ΜL (ref 0.6–4.47)
LYMPHOCYTES NFR BLD AUTO: 15 % (ref 14–44)
MCH RBC QN AUTO: 33.5 PG (ref 26.8–34.3)
MCHC RBC AUTO-ENTMCNC: 33 G/DL (ref 31.4–37.4)
MCV RBC AUTO: 102 FL (ref 82–98)
MONOCYTES # BLD AUTO: 0.55 THOUSAND/ΜL (ref 0.17–1.22)
MONOCYTES NFR BLD AUTO: 6 % (ref 4–12)
NEUTROPHILS # BLD AUTO: 6.79 THOUSANDS/ΜL (ref 1.85–7.62)
NEUTS SEG NFR BLD AUTO: 71 % (ref 43–75)
NONHDLC SERPL-MCNC: 69 MG/DL
NRBC BLD AUTO-RTO: 0 /100 WBCS
PLATELET # BLD AUTO: 241 THOUSANDS/UL (ref 149–390)
PMV BLD AUTO: 9.3 FL (ref 8.9–12.7)
PSA SERPL-MCNC: 1 NG/ML (ref 0–4)
RBC # BLD AUTO: 3.43 MILLION/UL (ref 3.88–5.62)
TRIGL SERPL-MCNC: 66 MG/DL
WBC # BLD AUTO: 9.46 THOUSAND/UL (ref 4.31–10.16)

## 2021-11-17 PROCEDURE — 85025 COMPLETE CBC W/AUTO DIFF WBC: CPT

## 2021-11-17 PROCEDURE — 36415 COLL VENOUS BLD VENIPUNCTURE: CPT

## 2021-11-17 PROCEDURE — 84153 ASSAY OF PSA TOTAL: CPT

## 2021-11-17 PROCEDURE — 80061 LIPID PANEL: CPT

## 2021-12-10 ENCOUNTER — OFFICE VISIT (OUTPATIENT)
Dept: INTERNAL MEDICINE CLINIC | Facility: CLINIC | Age: 86
End: 2021-12-10
Payer: MEDICARE

## 2021-12-10 VITALS
WEIGHT: 177 LBS | OXYGEN SATURATION: 98 % | TEMPERATURE: 98.3 F | BODY MASS INDEX: 31.36 KG/M2 | SYSTOLIC BLOOD PRESSURE: 124 MMHG | DIASTOLIC BLOOD PRESSURE: 60 MMHG | RESPIRATION RATE: 16 BRPM | HEART RATE: 68 BPM | HEIGHT: 63 IN

## 2021-12-10 DIAGNOSIS — N18.32 STAGE 3B CHRONIC KIDNEY DISEASE (HCC): ICD-10-CM

## 2021-12-10 DIAGNOSIS — E78.2 MIXED HYPERLIPIDEMIA: ICD-10-CM

## 2021-12-10 DIAGNOSIS — I10 PRIMARY HYPERTENSION: Primary | ICD-10-CM

## 2021-12-10 DIAGNOSIS — Z00.00 MEDICARE ANNUAL WELLNESS VISIT, SUBSEQUENT: ICD-10-CM

## 2021-12-10 DIAGNOSIS — Z23 ENCOUNTER FOR IMMUNIZATION: ICD-10-CM

## 2021-12-10 PROCEDURE — 90662 IIV NO PRSV INCREASED AG IM: CPT | Performed by: INTERNAL MEDICINE

## 2021-12-10 PROCEDURE — 99214 OFFICE O/P EST MOD 30 MIN: CPT | Performed by: INTERNAL MEDICINE

## 2021-12-10 PROCEDURE — 1123F ACP DISCUSS/DSCN MKR DOCD: CPT | Performed by: INTERNAL MEDICINE

## 2021-12-10 PROCEDURE — G0008 ADMIN INFLUENZA VIRUS VAC: HCPCS | Performed by: INTERNAL MEDICINE

## 2021-12-10 PROCEDURE — G0444 DEPRESSION SCREEN ANNUAL: HCPCS | Performed by: INTERNAL MEDICINE

## 2021-12-10 PROCEDURE — G0439 PPPS, SUBSEQ VISIT: HCPCS | Performed by: INTERNAL MEDICINE

## 2022-01-02 ENCOUNTER — APPOINTMENT (EMERGENCY)
Dept: CT IMAGING | Facility: HOSPITAL | Age: 87
DRG: 291 | End: 2022-01-02
Payer: MEDICARE

## 2022-01-02 ENCOUNTER — HOSPITAL ENCOUNTER (INPATIENT)
Facility: HOSPITAL | Age: 87
LOS: 6 days | Discharge: HOME HEALTH | DRG: 291 | End: 2022-01-08
Attending: EMERGENCY MEDICINE | Admitting: STUDENT IN AN ORGANIZED HEALTH CARE EDUCATION/TRAINING PROGRAM
Payer: MEDICARE

## 2022-01-02 ENCOUNTER — APPOINTMENT (EMERGENCY)
Dept: RADIOLOGY | Facility: HOSPITAL | Age: 87
DRG: 291 | End: 2022-01-02
Payer: MEDICARE

## 2022-01-02 DIAGNOSIS — I50.9 CHF EXACERBATION (HCC): Primary | ICD-10-CM

## 2022-01-02 PROBLEM — I50.23 ACUTE ON CHRONIC SYSTOLIC HEART FAILURE (HCC): Status: ACTIVE | Noted: 2022-01-02

## 2022-01-02 PROBLEM — R65.10 SIRS (SYSTEMIC INFLAMMATORY RESPONSE SYNDROME) (HCC): Status: ACTIVE | Noted: 2022-01-02

## 2022-01-02 PROBLEM — J96.21 ACUTE ON CHRONIC RESPIRATORY FAILURE WITH HYPOXIA (HCC): Status: ACTIVE | Noted: 2022-01-02

## 2022-01-02 PROBLEM — I50.21 ACUTE SYSTOLIC HEART FAILURE (HCC): Status: ACTIVE | Noted: 2022-01-02

## 2022-01-02 LAB
2HR DELTA HS TROPONIN: 97 NG/L
4HR DELTA HS TROPONIN: 324 NG/L
ALBUMIN SERPL BCP-MCNC: 3.4 G/DL (ref 3.5–5)
ALP SERPL-CCNC: 75 U/L (ref 46–116)
ALT SERPL W P-5'-P-CCNC: 32 U/L (ref 12–78)
ANION GAP SERPL CALCULATED.3IONS-SCNC: 19 MMOL/L (ref 4–13)
APTT PPP: 33 SECONDS (ref 23–37)
AST SERPL W P-5'-P-CCNC: 22 U/L (ref 5–45)
ATRIAL RATE: 96 BPM
BASE EX.OXY STD BLDV CALC-SCNC: 76 % (ref 60–80)
BASE EXCESS BLDV CALC-SCNC: -8.6 MMOL/L
BASOPHILS # BLD AUTO: 0.06 THOUSANDS/ΜL (ref 0–0.1)
BASOPHILS NFR BLD AUTO: 0 % (ref 0–1)
BILIRUB SERPL-MCNC: 0.49 MG/DL (ref 0.2–1)
BUN SERPL-MCNC: 29 MG/DL (ref 5–25)
CALCIUM ALBUM COR SERPL-MCNC: 10.1 MG/DL (ref 8.3–10.1)
CALCIUM SERPL-MCNC: 9.6 MG/DL (ref 8.3–10.1)
CARDIAC TROPONIN I PNL SERPL HS: 184 NG/L
CARDIAC TROPONIN I PNL SERPL HS: 411 NG/L
CARDIAC TROPONIN I PNL SERPL HS: 87 NG/L
CHLORIDE SERPL-SCNC: 95 MMOL/L (ref 100–108)
CO2 SERPL-SCNC: 16 MMOL/L (ref 21–32)
CREAT SERPL-MCNC: 1.71 MG/DL (ref 0.6–1.3)
EOSINOPHIL # BLD AUTO: 0.23 THOUSAND/ΜL (ref 0–0.61)
EOSINOPHIL NFR BLD AUTO: 1 % (ref 0–6)
ERYTHROCYTE [DISTWIDTH] IN BLOOD BY AUTOMATED COUNT: 12.7 % (ref 11.6–15.1)
FLUAV RNA RESP QL NAA+PROBE: NEGATIVE
FLUBV RNA RESP QL NAA+PROBE: NEGATIVE
GFR SERPL CREATININE-BSD FRML MDRD: 33 ML/MIN/1.73SQ M
GLUCOSE SERPL-MCNC: 167 MG/DL (ref 65–140)
HCO3 BLDV-SCNC: 14 MMOL/L (ref 24–30)
HCT VFR BLD AUTO: 33.5 % (ref 36.5–49.3)
HGB BLD-MCNC: 11.1 G/DL (ref 12–17)
IMM GRANULOCYTES # BLD AUTO: 0.07 THOUSAND/UL (ref 0–0.2)
IMM GRANULOCYTES NFR BLD AUTO: 0 % (ref 0–2)
INR PPP: 1.1 (ref 0.84–1.19)
LYMPHOCYTES # BLD AUTO: 1 THOUSANDS/ΜL (ref 0.6–4.47)
LYMPHOCYTES NFR BLD AUTO: 6 % (ref 14–44)
MAGNESIUM SERPL-MCNC: 1.9 MG/DL (ref 1.6–2.6)
MCH RBC QN AUTO: 33.7 PG (ref 26.8–34.3)
MCHC RBC AUTO-ENTMCNC: 33.1 G/DL (ref 31.4–37.4)
MCV RBC AUTO: 102 FL (ref 82–98)
MONOCYTES # BLD AUTO: 0.82 THOUSAND/ΜL (ref 0.17–1.22)
MONOCYTES NFR BLD AUTO: 5 % (ref 4–12)
NEUTROPHILS # BLD AUTO: 14.74 THOUSANDS/ΜL (ref 1.85–7.62)
NEUTS SEG NFR BLD AUTO: 88 % (ref 43–75)
NRBC BLD AUTO-RTO: 0 /100 WBCS
NT-PROBNP SERPL-MCNC: 9301 PG/ML
O2 CT BLDV-SCNC: 12.4 ML/DL
PCO2 BLDV: 22 MM HG (ref 42–50)
PH BLDV: 7.42 [PH] (ref 7.3–7.4)
PLATELET # BLD AUTO: 395 THOUSANDS/UL (ref 149–390)
PMV BLD AUTO: 9.4 FL (ref 8.9–12.7)
PO2 BLDV: 41.8 MM HG (ref 35–45)
POTASSIUM SERPL-SCNC: 4.6 MMOL/L (ref 3.5–5.3)
PR INTERVAL: 180 MS
PROT SERPL-MCNC: 7.6 G/DL (ref 6.4–8.2)
PROTHROMBIN TIME: 13.8 SECONDS (ref 11.6–14.5)
QRS AXIS: 9 DEGREES
QRSD INTERVAL: 166 MS
QT INTERVAL: 424 MS
QTC INTERVAL: 535 MS
RBC # BLD AUTO: 3.29 MILLION/UL (ref 3.88–5.62)
RSV RNA RESP QL NAA+PROBE: NEGATIVE
SARS-COV-2 RNA RESP QL NAA+PROBE: NEGATIVE
SODIUM SERPL-SCNC: 130 MMOL/L (ref 136–145)
T WAVE AXIS: 106 DEGREES
VENTRICULAR RATE: 96 BPM
WBC # BLD AUTO: 16.92 THOUSAND/UL (ref 4.31–10.16)

## 2022-01-02 PROCEDURE — 83735 ASSAY OF MAGNESIUM: CPT | Performed by: STUDENT IN AN ORGANIZED HEALTH CARE EDUCATION/TRAINING PROGRAM

## 2022-01-02 PROCEDURE — 36415 COLL VENOUS BLD VENIPUNCTURE: CPT | Performed by: EMERGENCY MEDICINE

## 2022-01-02 PROCEDURE — 85730 THROMBOPLASTIN TIME PARTIAL: CPT | Performed by: EMERGENCY MEDICINE

## 2022-01-02 PROCEDURE — 94760 N-INVAS EAR/PLS OXIMETRY 1: CPT

## 2022-01-02 PROCEDURE — 93010 ELECTROCARDIOGRAM REPORT: CPT | Performed by: INTERNAL MEDICINE

## 2022-01-02 PROCEDURE — 99285 EMERGENCY DEPT VISIT HI MDM: CPT

## 2022-01-02 PROCEDURE — 84484 ASSAY OF TROPONIN QUANT: CPT | Performed by: EMERGENCY MEDICINE

## 2022-01-02 PROCEDURE — 0241U HB NFCT DS VIR RESP RNA 4 TRGT: CPT | Performed by: EMERGENCY MEDICINE

## 2022-01-02 PROCEDURE — 71275 CT ANGIOGRAPHY CHEST: CPT

## 2022-01-02 PROCEDURE — 85610 PROTHROMBIN TIME: CPT | Performed by: EMERGENCY MEDICINE

## 2022-01-02 PROCEDURE — 85025 COMPLETE CBC W/AUTO DIFF WBC: CPT | Performed by: EMERGENCY MEDICINE

## 2022-01-02 PROCEDURE — 82805 BLOOD GASES W/O2 SATURATION: CPT | Performed by: EMERGENCY MEDICINE

## 2022-01-02 PROCEDURE — 96374 THER/PROPH/DIAG INJ IV PUSH: CPT

## 2022-01-02 PROCEDURE — 71045 X-RAY EXAM CHEST 1 VIEW: CPT

## 2022-01-02 PROCEDURE — 99223 1ST HOSP IP/OBS HIGH 75: CPT | Performed by: STUDENT IN AN ORGANIZED HEALTH CARE EDUCATION/TRAINING PROGRAM

## 2022-01-02 PROCEDURE — 99291 CRITICAL CARE FIRST HOUR: CPT | Performed by: EMERGENCY MEDICINE

## 2022-01-02 PROCEDURE — 93005 ELECTROCARDIOGRAM TRACING: CPT

## 2022-01-02 PROCEDURE — 83880 ASSAY OF NATRIURETIC PEPTIDE: CPT | Performed by: EMERGENCY MEDICINE

## 2022-01-02 PROCEDURE — 94002 VENT MGMT INPAT INIT DAY: CPT

## 2022-01-02 PROCEDURE — 80053 COMPREHEN METABOLIC PANEL: CPT | Performed by: EMERGENCY MEDICINE

## 2022-01-02 RX ORDER — HEPARIN SODIUM 5000 [USP'U]/ML
5000 INJECTION, SOLUTION INTRAVENOUS; SUBCUTANEOUS EVERY 8 HOURS SCHEDULED
Status: DISCONTINUED | OUTPATIENT
Start: 2022-01-02 | End: 2022-01-08 | Stop reason: HOSPADM

## 2022-01-02 RX ORDER — SENNOSIDES 8.6 MG
1 TABLET ORAL DAILY
Status: DISCONTINUED | OUTPATIENT
Start: 2022-01-03 | End: 2022-01-08 | Stop reason: HOSPADM

## 2022-01-02 RX ORDER — LANOLIN ALCOHOL/MO/W.PET/CERES
100 CREAM (GRAM) TOPICAL DAILY
Status: DISCONTINUED | OUTPATIENT
Start: 2022-01-03 | End: 2022-01-08 | Stop reason: HOSPADM

## 2022-01-02 RX ORDER — FUROSEMIDE 10 MG/ML
40 INJECTION INTRAMUSCULAR; INTRAVENOUS
Status: DISCONTINUED | OUTPATIENT
Start: 2022-01-03 | End: 2022-01-05

## 2022-01-02 RX ORDER — CALCIUM CARBONATE 200(500)MG
1000 TABLET,CHEWABLE ORAL DAILY PRN
Status: DISCONTINUED | OUTPATIENT
Start: 2022-01-02 | End: 2022-01-08 | Stop reason: HOSPADM

## 2022-01-02 RX ORDER — ASPIRIN 81 MG/1
324 TABLET, CHEWABLE ORAL ONCE
Status: COMPLETED | OUTPATIENT
Start: 2022-01-02 | End: 2022-01-02

## 2022-01-02 RX ORDER — FUROSEMIDE 10 MG/ML
40 INJECTION INTRAMUSCULAR; INTRAVENOUS ONCE
Status: COMPLETED | OUTPATIENT
Start: 2022-01-02 | End: 2022-01-02

## 2022-01-02 RX ORDER — DOCUSATE SODIUM 100 MG/1
100 CAPSULE, LIQUID FILLED ORAL 2 TIMES DAILY
Status: DISCONTINUED | OUTPATIENT
Start: 2022-01-02 | End: 2022-01-08 | Stop reason: HOSPADM

## 2022-01-02 RX ORDER — METOPROLOL SUCCINATE 25 MG/1
25 TABLET, EXTENDED RELEASE ORAL DAILY
Status: DISCONTINUED | OUTPATIENT
Start: 2022-01-03 | End: 2022-01-08 | Stop reason: HOSPADM

## 2022-01-02 RX ORDER — LEVETIRACETAM 500 MG/1
250 TABLET ORAL 2 TIMES DAILY
Status: DISCONTINUED | OUTPATIENT
Start: 2022-01-02 | End: 2022-01-08 | Stop reason: HOSPADM

## 2022-01-02 RX ORDER — NICOTINE 21 MG/24HR
1 PATCH, TRANSDERMAL 24 HOURS TRANSDERMAL DAILY
Status: DISCONTINUED | OUTPATIENT
Start: 2022-01-03 | End: 2022-01-08 | Stop reason: HOSPADM

## 2022-01-02 RX ORDER — ACETAMINOPHEN 325 MG/1
650 TABLET ORAL EVERY 6 HOURS PRN
Status: DISCONTINUED | OUTPATIENT
Start: 2022-01-02 | End: 2022-01-08 | Stop reason: HOSPADM

## 2022-01-02 RX ORDER — ONDANSETRON 2 MG/ML
4 INJECTION INTRAMUSCULAR; INTRAVENOUS EVERY 6 HOURS PRN
Status: DISCONTINUED | OUTPATIENT
Start: 2022-01-02 | End: 2022-01-08 | Stop reason: HOSPADM

## 2022-01-02 RX ADMIN — ASPIRIN 324 MG: 81 TABLET, CHEWABLE ORAL at 15:01

## 2022-01-02 RX ADMIN — LEVETIRACETAM 250 MG: 250 TABLET, FILM COATED ORAL at 20:53

## 2022-01-02 RX ADMIN — HEPARIN SODIUM 5000 UNITS: 5000 INJECTION INTRAVENOUS; SUBCUTANEOUS at 20:54

## 2022-01-02 RX ADMIN — FUROSEMIDE 40 MG: 10 INJECTION, SOLUTION INTRAVENOUS at 15:40

## 2022-01-02 RX ADMIN — IOHEXOL 85 ML: 350 INJECTION, SOLUTION INTRAVENOUS at 15:10

## 2022-01-02 NOTE — ED NOTES
Patient returned from [address] scan     Amie Cooper RN  01/02/22 [id number]

## 2022-01-02 NOTE — RESPIRATORY THERAPY NOTE
01/02/22 1524   Respiratory Assessment   Resp Comments Pt's resp status has imrpoved  Pt transitioned to NC  Will cont to monitor       Oxygen Therapy/Pulse Ox   O2 Device Nasal cannula   Nasal Cannula O2 Flow Rate (L/min) 6 L/min   Calculated FIO2 (%) - Nasal Cannula 44   SpO2 95 %   SpO2 Activity At Rest   $ Pulse Oximetry Spot Check Charge Completed

## 2022-01-02 NOTE — ASSESSMENT & PLAN NOTE
Wt Readings from Last 3 Encounters:   12/10/21 80 3 kg (177 lb)   08/31/21 75 4 kg (166 lb 3 2 oz)   04/16/21 73 kg (161 lb)     · Elevated BNP, cardiomegaly, vascular congestion, hypoxia  · Start IV lasix, monitor lytes, I/O, weight  · Last ECHO in 2020 with EF 40-45%

## 2022-01-02 NOTE — ASSESSMENT & PLAN NOTE
Lab Results   Component Value Date    EGFR 33 01/02/2022    EGFR 33 09/29/2021    EGFR 30 06/21/2021    CREATININE 1 71 (H) 01/02/2022    CREATININE 1 73 (H) 09/29/2021    CREATININE 1 91 (H) 06/21/2021   · At baseline, monitor daily

## 2022-01-02 NOTE — H&P
[address] [place] [address] 11/25/1927, 80 y o  male MRN: [id number]  Unit/Bed#: ED 20 Encounter: [id number]  Primary Care Provider: Porfirio Urbano DO   Date and time admitted to hospital: 1/2/2022 12:51 PM    * Acute systolic heart failure ([facility] [address] )  Assessment & Plan  Wt Readings from Last 3 Encounters:   12/10/21 80 3 kg (177 lb)   08/31/21 75 4 kg (166 lb 3 2 oz)   04/16/21 73 kg (161 lb)     · Elevated BNP, cardiomegaly, vascular congestion, hypoxia  · Start IV lasix, monitor lytes, I/O, weight  · Last ECHO in 2020 with EF 40-45%      SIRS (systemic inflammatory response syndrome) (HCC)  Assessment & Plan  · Tachycardia, tachpynea, hypoxic respiratory failure  · In setting of decompensated heart failure  · Monitor     Acute on chronic respiratory failure with hypoxia (HCC)  Assessment & Plan  · Secondary to decompensated heart failure  · Continue IV lasix, monitor respiratory status    Stage 3b chronic kidney disease [facility])  Assessment & Plan  Lab Results   Component Value Date    EGFR 33 01/02/2022    EGFR 33 09/29/2021    EGFR 30 06/21/2021    CREATININE 1 71 (H) 01/02/2022    CREATININE 1 73 (H) 09/29/2021    CREATININE 1 91 (H) 06/21/2021   · At baseline, monitor daily    Seizure ([facility] [address] )  Assessment & Plan  · Continue home dose keppra     Benign prostatic hyperplasia  Assessment & Plan  · Continue home dose flomax    Hyperlipidemia  Assessment & Plan  · Continue home meds      VTE Pharmacologic Prophylaxis: VTE Score: 7 High Risk (Score >/= 5) - Pharmacological DVT Prophylaxis Ordered: heparin  Sequential Compression Devices Ordered  Code Status: Level 1 - Full Code   Discussion with family: Patient declined call to   Anticipated Length of Stay: Patient will be admitted on an inpatient basis with an anticipated length of stay of greater than 2 midnights secondary to acute hypoxic respiratory failure, decompensated heart failure      Total Time for Visit, including Counseling / Coordination of Care: 30 minutes Greater than 50% of this total time spent on direct patient counseling and coordination of care  Chief Complaint: shortness of breath     History of Present Illness:  Dana Quesada is a 80 y o  male with a PMH of htn, hld, chf who presents with worsening shortness of breath over the last several days associated with lower extremity swelling  In the ED, requiring supplemental oxygen and admitted to medicine for further management  Review of Systems:  Review of Systems   Constitutional: Negative for chills and fever  HENT: Negative for ear pain and sore throat  Eyes: Negative for pain and visual disturbance  Respiratory: Positive for cough and shortness of breath  Cardiovascular: Positive for leg swelling  Negative for chest pain and palpitations  Gastrointestinal: Negative for abdominal pain and vomiting  Genitourinary: Negative for dysuria and hematuria  Musculoskeletal: Negative for arthralgias and back pain  Skin: Negative for color change and rash  Neurological: Negative for seizures and syncope  All other systems reviewed and are negative  Past Medical and Surgical History:   Past Medical History:   Diagnosis Date    Esophageal reflux     Hyperlipidemia     Hypertension        Past Surgical History:   Procedure Laterality Date    BACK SURGERY      CARDIAC SURGERY      COLON SURGERY      CORONARY ARTERY BYPASS GRAFT      TONSILLECTOMY      Last Assessed: 12/5/2016       Meds/Allergies:  Prior to Admission medications    Medication Sig Start Date End Date Taking?  Authorizing Provider   amLODIPine (NORVASC) 5 mg tablet Take 1 tablet (5 mg total) by mouth daily 8/31/21   Ismael Riggs DO   aspirin (ECOTRIN) 325 mg EC tablet Take 1 tablet by mouth daily    Historical Provider, MD   cyanocobalamin (VITAMIN B-12) 500 MCG tablet Take 1 tablet (500 mcg total) by mouth daily 6/24/21   Ismael Riggs DO   ergocalciferol (VITAMIN D2) 50,000 units TAKE 1 CAPSULE BY MOUTH ONE TIME PER WEEK 12/3/21   Ismael Riggs DO   levETIRAcetam (KEPPRA) 250 mg tablet TAKE 1 TABLET TWICE A DAY 21   Ismael Riggs DO   pantoprazole (PROTONIX) 40 mg tablet Take 40 mg by mouth daily    Historical Provider, MD   Polyethylene Glycol 3350 (MIRALAX PO) Take by mouth as needed      Historical Provider, MD   simvastatin (ZOCOR) 40 mg tablet Take 40 mg by mouth daily    Historical Provider, MD   tamsulosin (FLOMAX) 0 4 mg Take 0 4 mg by mouth daily    Historical Provider, MD   thiamine 100 MG tablet TAKE 1 TABLET BY MOUTH EVERY DAY 21   Ismael Riggs DO   TOPROL XL 25 MG 24 hr tablet Take 25 mg by mouth daily  18   Historical Provider, MD     I have reviewed home medications with patient personally  Allergies: Allergies   Allergen Reactions    Penicillamine Rash    Penicillins Rash     Rash on hands    Morphine Other (See Comments)     Low blood pressure       Social History:  Marital Status:     Occupation: na  Patient Pre-hospital Living Situation: Home  Patient Pre-hospital Level of Mobility: unable to be assessed at time of evaluation  Patient Pre-hospital Diet Restrictions: heart healthy   Substance Use History:   Social History     Substance and Sexual Activity   Alcohol Use Yes    Comment: daily     Social History     Tobacco Use   Smoking Status Former Smoker    Packs/day: 0 25    Years: 80 00    Pack years: 20 00    Types: Cigars, Cigarettes    Quit date: 1950    Years since quittin 7   Smokeless Tobacco Current User   Tobacco Comment    occasional-cigar     Social History     Substance and Sexual Activity   Drug Use No       Family History:  Family History   Problem Relation Age of Onset    Cancer Mother     Stroke Father        Physical Exam:     Vitals:   Blood Pressure: 112/67 (22 1602)  Pulse: 92 (22 1602)  Respirations: 18 (22 1602)  SpO2: 96 % (22 1632)    Physical Exam  Vitals and nursing note reviewed  Constitutional:       Appearance: Normal appearance  HENT:      Head: Normocephalic and atraumatic  Right Ear: External ear normal       Left Ear: External ear normal       Nose: No congestion or rhinorrhea  Mouth/Throat:      Mouth: Mucous membranes are dry  Pharynx: Oropharynx is clear  Eyes:      General:         Right eye: No discharge  Left eye: No discharge  Cardiovascular:      Rate and Rhythm: Normal rate and regular rhythm  Pulmonary:      Effort: Pulmonary effort is normal  No respiratory distress  Abdominal:      General: Abdomen is flat  Palpations: Abdomen is soft  Musculoskeletal:      Cervical back: Normal range of motion and neck supple  Right lower leg: No edema  Left lower leg: No edema  Skin:     General: Skin is warm and dry  Neurological:      General: No focal deficit present  Mental Status: He is alert  Mental status is at baseline  Psychiatric:         Mood and Affect: Mood normal          Behavior: Behavior normal           Additional Data:     Lab Results:  Results from last 7 days   Lab Units 01/02/22  1329   WBC Thousand/uL 16 92*   HEMOGLOBIN g/dL 11 1*   HEMATOCRIT % 33 5*   PLATELETS Thousands/uL 395*   NEUTROS PCT % 88*   LYMPHS PCT % 6*   MONOS PCT % 5   EOS PCT % 1     Results from last 7 days   Lab Units 01/02/22  1329   SODIUM mmol/L 130*   POTASSIUM mmol/L 4 6   CHLORIDE mmol/L 95*   CO2 mmol/L 16*   BUN mg/dL 29*   CREATININE mg/dL 1 71*   ANION GAP mmol/L 19*   CALCIUM mg/dL 9 6   ALBUMIN g/dL 3 4*   TOTAL BILIRUBIN mg/dL 0 49   ALK PHOS U/L 75   ALT U/L 32   AST U/L 22   GLUCOSE RANDOM mg/dL 167*     Results from last 7 days   Lab Units 01/02/22  1426   INR  1 10                   Imaging: Reviewed radiology reports from this admission including: CTA chest  CTA ED chest PE study   Final Result by Dinora Tinajero DO (01/02 4227)      1   No evidence of pulmonary embolus to the segmental level  2  Borderline cardiomegaly with central vascular congestion, interstitial thickening and bilateral pleural effusions consistent with CHF  Bilateral groundglass opacities and patchy consolidative changes throughout both lungs could all potentially be    attributed to pulmonary edema, however, there is a slightly more peripheral than central distribution of these changes seen in the upper lobes without a clear perihilar predominance as would typically be seen with pulmonary edema  Possibility of    superimposed pneumonia including viral illness cannot be completely excluded  3  Mild COPD  Additional incidental findings as above  Workstation performed: [id number]         XR chest 1 view portable   Final Result by Arie Berrios MD (01/02 1447)      Findings are most suggestive of interstitial edema with small pleural effusions/CHF                  Workstation performed: [id number]             EKG and Other Studies Reviewed on Admission:   · EKG: No EKG obtained  ** Please Note: This note has been constructed using a voice recognition system   **

## 2022-01-02 NOTE — RESPIRATORY THERAPY NOTE
01/02/22 1345   Respiratory Assessment   Assessment Type Assess only   General Appearance Awake; Alert   Respiratory Pattern Labored; Tachypneic   Chest Assessment Chest expansion symmetrical   Bilateral Breath Sounds Diminished;Rales   Cough Non-productive   Resp Comments Pt in w/ resp distress    Pt placed on BiPAP at this time   O2 Device BiPAP   Non-Invasive Information   O2 Interface Device Face mask   Non-Invasive Ventilation Mode BiPAP   $ Continous NIV Initial   SpO2 98 %   $ Pulse Oximetry Spot Check Charge Completed   Non-Invasive Settings   IPAP (cm) 12 cm   EPAP (cm) 6 cm   Rate (Set) 8   FiO2 (%) 50   Pressure Support (cm H2O) 6   Rise Time 0 4  (secs)   Inspiratory Time (Set) 0 8   Humidification   (heater)   Temperature (Set) 31   Non-Invasive Readings   Total Rate 29   MV (Mech) 20   Peak Pressure (Obs) 12   Spontaneous Vt (mL) 720   Heater Temperature (Obs) 31   Skin Intervention Skin intact   Non-Invasive Alarms   Insp Pressure High (cm H20) 25   Insp Pressure Low (cm H20) 4   Low Insp Pressure Time (sec) 20 sec   MV Low (L/min) 4   High Resp Rate (BPM) 55 BPM   Low Resp Rate (BPM) 8 BPM   Maintenance   Water bag changed No

## 2022-01-02 NOTE — ASSESSMENT & PLAN NOTE
· Tachycardia, tachpynea, hypoxic respiratory failure  · In setting of decompensated heart failure  · Monitor

## 2022-01-02 NOTE — ED PROVIDER NOTES
History  Chief Complaint   Patient presents with    Chest Pain     brought in via chest pain for 1 week, 90% on RA,      Patient is a 63-year-old male past medical history of hypertension, hyperlipidemia, CKD, CAD, seizure disorder presenting with chest pain and shortness of breath  Patient notes shortness of breath worsening over the course of 1 week as well as left-sided intermittent chest pain which he states he does not have now  Notes cough but denies fevers and is vaccinated against COVID-19  Denies any leg pain or swelling  States he has been compliant with his medications  Prior to Admission Medications   Prescriptions Last Dose Informant Patient Reported? Taking?    Polyethylene Glycol 3350 (MIRALAX PO)  Child Yes No   Sig: Take by mouth as needed     TOPROL XL 25 MG 24 hr tablet  Child Yes No   Sig: Take 25 mg by mouth daily    amLODIPine (NORVASC) 5 mg tablet   No No   Sig: Take 1 tablet (5 mg total) by mouth daily   aspirin (ECOTRIN) 325 mg EC tablet  Child Yes No   Sig: Take 1 tablet by mouth daily   cyanocobalamin (VITAMIN B-12) 500 MCG tablet  Child No No   Sig: Take 1 tablet (500 mcg total) by mouth daily   ergocalciferol (VITAMIN D2) 50,000 units   No No   Sig: TAKE 1 CAPSULE BY MOUTH ONE TIME PER WEEK   levETIRAcetam (KEPPRA) 250 mg tablet   No No   Sig: TAKE 1 TABLET TWICE A DAY   pantoprazole (PROTONIX) 40 mg tablet  Child Yes No   Sig: Take 40 mg by mouth daily   simvastatin (ZOCOR) 40 mg tablet  Child Yes No   Sig: Take 40 mg by mouth daily   tamsulosin (FLOMAX) 0 4 mg  Child Yes No   Sig: Take 0 4 mg by mouth daily   thiamine 100 MG tablet  Child No No   Sig: TAKE 1 TABLET BY MOUTH EVERY DAY      Facility-Administered Medications: None       Past Medical History:   Diagnosis Date    Esophageal reflux     Hyperlipidemia     Hypertension        Past Surgical History:   Procedure Laterality Date    BACK SURGERY      CARDIAC SURGERY      COLON SURGERY      CORONARY ARTERY BYPASS GRAFT      TONSILLECTOMY      Last Assessed: 2016       Family History   Problem Relation Age of Onset    Cancer Mother     Stroke Father      I have reviewed and agree with the history as documented  E-Cigarette/Vaping    E-Cigarette Use Never User      E-Cigarette/Vaping Substances    Nicotine No     THC No     CBD No     Flavoring No     Other No     Unknown No      Social History     Tobacco Use    Smoking status: Former Smoker     Packs/day: 0 25     Years: 80 00     Pack years: 20 00     Types: Cigars, Cigarettes     Quit date: 1950     Years since quittin 7    Smokeless tobacco: Current User    Tobacco comment: occasional-cigar   Vaping Use    Vaping Use: Never used   Substance Use Topics    Alcohol use: Yes     Comment: daily    Drug use: No       Review of Systems   All other systems reviewed and are negative  Physical Exam  Physical Exam  Vitals reviewed  Constitutional:       General: He is not in acute distress  Appearance: Normal appearance  He is not ill-appearing  HENT:      Mouth/Throat:      Mouth: Mucous membranes are moist    Eyes:      Conjunctiva/sclera: Conjunctivae normal       Comments: Pale conjunctiva   Cardiovascular:      Rate and Rhythm: Normal rate and regular rhythm  Heart sounds: Normal heart sounds  Pulmonary:      Effort: Tachypnea, accessory muscle usage and respiratory distress present  Breath sounds: Decreased breath sounds and wheezing present  No rhonchi or rales  Comments: Patient has conversational dyspnea, abdominal breathing, accessory muscle use, diminished lung sounds bilaterally with faint expiratory wheezes in all lung fields  Abdominal:      General: Abdomen is flat  Palpations: Abdomen is soft  Tenderness: There is no abdominal tenderness  Musculoskeletal:         General: No swelling  Normal range of motion  Cervical back: Neck supple  Right lower leg: No tenderness  No edema  Left lower leg: No tenderness  No edema  Skin:     General: Skin is warm and dry  Neurological:      General: No focal deficit present  Mental Status: He is alert     Psychiatric:         Mood and Affect: Mood normal          Vital Signs  ED Triage Vitals   Temp Pulse Respirations Blood Pressure SpO2   -- 01/02/22 1310 01/02/22 1310 01/02/22 1310 01/02/22 1310    (!) 110 (!) 42 120/68 90 %      Temp src Heart Rate Source Patient Position - Orthostatic VS BP Location FiO2 (%)   -- 01/02/22 1310 01/02/22 1602 01/02/22 1310 --    Monitor Lying Right arm       Pain Score       01/02/22 1310       No Pain           Vitals:    01/02/22 1345 01/02/22 1430 01/02/22 1533 01/02/22 1602   BP: 118/65 113/59 121/83 112/67   Pulse: 70 77 103 92   Patient Position - Orthostatic VS:    Lying         Visual Acuity      ED Medications  Medications   levETIRAcetam (KEPPRA) tablet 250 mg (250 mg Oral Given 1/2/22 2053)   thiamine tablet 100 mg (has no administration in time range)   acetaminophen (TYLENOL) tablet 650 mg (has no administration in time range)   calcium carbonate (TUMS) chewable tablet 1,000 mg (has no administration in time range)   docusate sodium (COLACE) capsule 100 mg (100 mg Oral Not Given 1/2/22 2053)   senna (SENOKOT) tablet 8 6 mg (has no administration in time range)   ondansetron (ZOFRAN) injection 4 mg (has no administration in time range)   nicotine (NICODERM CQ) 14 mg/24hr TD 24 hr patch 1 patch (has no administration in time range)   heparin (porcine) subcutaneous injection 5,000 Units (5,000 Units Subcutaneous Given 1/2/22 2054)   furosemide (LASIX) injection 40 mg (has no administration in time range)   metoprolol succinate (TOPROL-XL) 24 hr tablet 25 mg (has no administration in time range)   furosemide (LASIX) injection 40 mg (40 mg Intravenous Given 1/2/22 1540)   aspirin chewable tablet 324 mg (324 mg Oral Given 1/2/22 1501)   iohexol (OMNIPAQUE) 350 MG/ML injection (SINGLE-DOSE) 85 mL (85 mL Intravenous Given 1/2/22 1510)       Diagnostic Studies  Results Reviewed     Procedure Component Value Units Date/Time    HS Troponin I 4hr [[id number]]  (Abnormal) Collected: 01/02/22 1825    Lab Status: Final result Specimen: Blood from Arm, Right Updated: 01/02/22 1927     hs TnI 4hr 411 ng/L      Delta 4hr hsTnI 324 ng/L     Magnesium [[id number]]  (Normal) Collected: 01/02/22 1329    Lab Status: Final result Specimen: Blood from Arm, Left Updated: 01/02/22 1754     Magnesium 1 9 mg/dL     HS Troponin I 2hr [[id number]]  (Abnormal) Collected: 01/02/22 1546    Lab Status: Final result Specimen: Blood from Arm, Right Updated: 01/02/22 1654     hs TnI 2hr 184 ng/L      Delta 2hr hsTnI 97 ng/L     HS Troponin 0hr (reflex protocol) [[id number]]  (Abnormal) Collected: 01/02/22 1329    Lab Status: Final result Specimen: Blood from Arm, Left Updated: 01/02/22 1453     hs TnI 0hr 87 ng/L     Protime-INR [[id number]]  (Normal) Collected: 01/02/22 1426    Lab Status: Final result Specimen: Blood from Arm, Left Updated: 01/02/22 1446     Protime 13 8 seconds      INR 1 10    APTT [[id number]]  (Normal) Collected: 01/02/22 1426    Lab Status: Final result Specimen: Blood from Arm, Left Updated: 01/02/22 1446     PTT 33 seconds     NT-BNP PRO [[id number]]  (Abnormal) Collected: 01/02/22 1329    Lab Status: Final result Specimen: Blood from Arm, Left Updated: 01/02/22 1428     NT-proBNP 9,301 pg/mL     COVID/FLU/RSV - 2 hour TAT [[id number]]  (Normal) Collected: 01/02/22 1324    Lab Status: Final result Specimen: Nares from Nasopharyngeal Swab Updated: 01/02/22 1426     SARS-CoV-2 Negative     INFLUENZA A PCR Negative     INFLUENZA B PCR Negative     RSV PCR Negative    Narrative:      FOR PEDIATRIC PATIENTS - copy/paste COVID Guidelines URL to browser: [email or web address]  [email or web address]     This test has been authorized by FDA under an EUA (Emergency Use Assay) for use by authorized laboratories  Clinical caution and judgement should be used with the interpretation of these results with consideration of the clinical impression and other laboratory testing  Testing reported as "Positive" or "Negative" has been proven to be accurate according to standard laboratory validation requirements  All testing is performed with control materials showing appropriate reactivity at standard intervals      Comprehensive metabolic panel [[id number]]  (Abnormal) Collected: 01/02/22 1329    Lab Status: Final result Specimen: Blood from Arm, Left Updated: 01/02/22 1421     Sodium 130 mmol/L      Potassium 4 6 mmol/L      Chloride 95 mmol/L      CO2 16 mmol/L      ANION GAP 19 mmol/L      BUN 29 mg/dL      Creatinine 1 71 mg/dL      Glucose 167 mg/dL      Calcium 9 6 mg/dL      Corrected Calcium 10 1 mg/dL      AST 22 U/L      ALT 32 U/L      Alkaline Phosphatase 75 U/L      Total Protein 7 6 g/dL      Albumin 3 4 g/dL      Total Bilirubin 0 49 mg/dL      eGFR 33 ml/min/1 73sq m     Narrative:      [facility] guidelines for Chronic Kidney Disease (CKD):     Stage 1 with normal or high GFR (GFR > 90 mL/min/1 73 square meters)    Stage 2 Mild CKD (GFR = 60-89 mL/min/1 73 square meters)    Stage 3A Moderate CKD (GFR = 45-59 mL/min/1 73 square meters)    Stage 3B Moderate CKD (GFR = 30-44 mL/min/1 73 square meters)    Stage 4 Severe CKD (GFR = 15-29 mL/min/1 73 square meters)    Stage 5 End Stage CKD (GFR <15 mL/min/1 73 square meters)  Note: GFR calculation is accurate only with a steady state creatinine    Blood gas, venous [[id number]]  (Abnormal) Collected: 01/02/22 1329    Lab Status: Final result Specimen: Blood from Arm, Left Updated: 01/02/22 1342     pH, David 7 422     pCO2, David 22 0 mm Hg      pO2, David 41 8 mm Hg      HCO3, David 14 0 mmol/L      Base Excess, David -8 6 mmol/L      O2 Content, David 12 4 ml/dL      O2 HGB, VENOUS 76 0 %     CBC and differential [[id number]]  (Abnormal) Collected: 01/02/22 1329    Lab Status: Final result Specimen: Blood from Arm, Left Updated: 01/02/22 1337     WBC 16 92 Thousand/uL      RBC 3 29 Million/uL      Hemoglobin 11 1 g/dL      Hematocrit 33 5 %       fL      MCH 33 7 pg      MCHC 33 1 g/dL      RDW 12 7 %      MPV 9 4 fL      Platelets 577 Thousands/uL      nRBC 0 /100 WBCs      Neutrophils Relative 88 %      Immat GRANS % 0 %      Lymphocytes Relative 6 %      Monocytes Relative 5 %      Eosinophils Relative 1 %      Basophils Relative 0 %      Neutrophils Absolute 14 74 Thousands/µL      Immature Grans Absolute 0 07 Thousand/uL      Lymphocytes Absolute 1 00 Thousands/µL      Monocytes Absolute 0 82 Thousand/µL      Eosinophils Absolute 0 23 Thousand/µL      Basophils Absolute 0 06 Thousands/µL                  CTA ED chest PE study   Final Result by Lenore Rolle DO (01/02 1607)      1  No evidence of pulmonary embolus to the segmental level  2  Borderline cardiomegaly with central vascular congestion, interstitial thickening and bilateral pleural effusions consistent with CHF  Bilateral groundglass opacities and patchy consolidative changes throughout both lungs could all potentially be    attributed to pulmonary edema, however, there is a slightly more peripheral than central distribution of these changes seen in the upper lobes without a clear perihilar predominance as would typically be seen with pulmonary edema  Possibility of    superimposed pneumonia including viral illness cannot be completely excluded  3  Mild COPD  Additional incidental findings as above        Workstation performed: [id number]         XR chest 1 view portable   Final Result by Stuart Jovel MD (01/02 2847)      Findings are most suggestive of interstitial edema with small pleural effusions/CHF                  Workstation performed: [id number]                    Procedures  CriticalCare Time  Performed by: Shira Perez   Authorized by: Scot Esteban DO     Critical care provider statement:     Critical care time (minutes):  40    Critical care was necessary to treat or prevent imminent or life-threatening deterioration of the following conditions:  Respiratory failure and cardiac failure    Critical care was time spent personally by me on the following activities:  Obtaining history from patient or surrogate, development of treatment plan with patient or surrogate, discussions with consultants, evaluation of patient's response to treatment, examination of patient, interpretation of cardiac output measurements, ordering and performing treatments and interventions, ordering and review of radiographic studies, ordering and review of laboratory studies, re-evaluation of patient's condition and review of old charts             ED Course  ED Course as of 01/02/22 2107   Carissa Mcdonnell Jan 02, 2022   1445 Respiratory distress is markedly improved on BiPAP, chest x-ray shows pulmonary edema, patient has elevated BNP, mild alkalosis likely secondary to hyperventilation, will obtain CT and admit and have given Lasix  1526 Patient now saturating appropriately on 6 L nasal cannula, no signs respiratory distress, will continue to monitor, still pending CT PE    1615 Patient now with desaturations to the high 80s, will switch to mid flow nasal cannula  MDM  Number of Diagnoses or Management Options  Diagnosis management comments: Patient is a 68-year-old male past medical history of hypertension, hyperlipidemia, CAD, CKD, seizure disorder presenting with chest pain shortness of breath    Patient is in respiratory distress at bedside with tachypnea, conversational dyspnea, accessory muscle use, diminished lung sounds bilaterally with faint expiratory wheezes, no lower extremity edema however patient has no history of COPD, suspect CHF in the setting of hypertension and prior history and will place on BiPAP, obtain cardiac workup and stat chest x-ray and reassess  Disposition  Final diagnoses:   CHF exacerbation ([address] )     Time reflects when diagnosis was documented in both MDM as applicable and the Disposition within this note     Time User Action Codes Description Comment    1/2/2022  4:25 PM Daniel Fabrice Xochilt [I50 9] CHF exacerbation [facility])       ED Disposition     ED Disposition Condition Date/Time Comment    Admit Stable Sun Jan 2, 2022  4:24 PM Case was discussed with Dr Marilee Burt and the patient's admission status was agreed to be Admission Status: inpatient status to the service of Dr Marilee Burt   Follow-up Information    None         Patient's Medications   Discharge Prescriptions    No medications on file       No discharge procedures on file      PDMP Review     None          ED Provider  Electronically Signed by           Robert Navarro DO  01/02/22 5721

## 2022-01-02 NOTE — RESPIRATORY THERAPY NOTE
01/02/22 1632   Respiratory Assessment   Resp Comments Pt placed on [address]       Oxygen Therapy/Pulse Ox   O2 Device Mid flow nasal cannula   O2 Therapy Oxygen humidified   Nasal Cannula O2 Flow Rate (L/min) 6 L/min   Calculated FIO2 (%) - Nasal Cannula 44   SpO2 96 %   SpO2 Activity At Rest   $ Pulse Oximetry Spot Check Charge Completed

## 2022-01-03 ENCOUNTER — APPOINTMENT (INPATIENT)
Dept: NON INVASIVE DIAGNOSTICS | Facility: HOSPITAL | Age: 87
DRG: 291 | End: 2022-01-03
Payer: MEDICARE

## 2022-01-03 LAB
ANION GAP SERPL CALCULATED.3IONS-SCNC: 16 MMOL/L (ref 4–13)
AORTIC ROOT: 3.6 CM
APICAL FOUR CHAMBER EJECTION FRACTION: 40 %
AV LVOT MEAN GRADIENT: 1 MMHG
AV LVOT PEAK GRADIENT: 2 MMHG
AV PEAK GRADIENT: 12 MMHG
BUN SERPL-MCNC: 26 MG/DL (ref 5–25)
CALCIUM SERPL-MCNC: 9.3 MG/DL (ref 8.3–10.1)
CHLORIDE SERPL-SCNC: 98 MMOL/L (ref 100–108)
CO2 SERPL-SCNC: 21 MMOL/L (ref 21–32)
CREAT SERPL-MCNC: 1.66 MG/DL (ref 0.6–1.3)
DOP CALC LVOT PEAK VEL VTI: 14.33 CM
DOP CALC LVOT PEAK VEL: 0.7 M/S
E WAVE DECELERATION TIME: 99 MS
ERYTHROCYTE [DISTWIDTH] IN BLOOD BY AUTOMATED COUNT: 12.7 % (ref 11.6–15.1)
FRACTIONAL SHORTENING: 18 % (ref 28–44)
GFR SERPL CREATININE-BSD FRML MDRD: 34 ML/MIN/1.73SQ M
GLUCOSE SERPL-MCNC: 86 MG/DL (ref 65–140)
HCT VFR BLD AUTO: 29.5 % (ref 36.5–49.3)
HGB BLD-MCNC: 9.9 G/DL (ref 12–17)
INTERVENTRICULAR SEPTUM IN DIASTOLE (PARASTERNAL SHORT AXIS VIEW): 1.2 CM
LEFT ATRIUM AREA SYSTOLE SINGLE PLANE A4C: 23.8 CM2
LEFT INTERNAL DIMENSION IN SYSTOLE: 3.6 CM (ref 2.1–4)
LEFT VENTRICULAR INTERNAL DIMENSION IN DIASTOLE: 4.4 CM (ref 4.44–6.61)
LEFT VENTRICULAR POSTERIOR WALL IN END DIASTOLE: 1.1 CM
LEFT VENTRICULAR STROKE VOLUME: 33 ML
MAGNESIUM SERPL-MCNC: 1.8 MG/DL (ref 1.6–2.6)
MCH RBC QN AUTO: 33 PG (ref 26.8–34.3)
MCHC RBC AUTO-ENTMCNC: 33.6 G/DL (ref 31.4–37.4)
MCV RBC AUTO: 98 FL (ref 82–98)
MITRAL REGURGITATION PEAK VELOCITY: 5.2 M/S
MITRAL VALVE MEAN INFLOW VELOCITY: 3.97 M/S
MITRAL VALVE REGURGITANT PEAK GRADIENT: 108 MMHG
MV E'TISSUE VEL-SEP: 5 CM/S
MV EROA: 0.1 CM2
MV PEAK A VEL: 1.3 M/S
MV PEAK E VEL: 109 CM/S
MV STENOSIS PRESSURE HALF TIME: 0 MS
PISA MRMAX VEL: 0.36 M/S
PISA RADIUS: 0.4 CM
PLATELET # BLD AUTO: 329 THOUSANDS/UL (ref 149–390)
PMV BLD AUTO: 9.2 FL (ref 8.9–12.7)
POTASSIUM SERPL-SCNC: 3.9 MMOL/L (ref 3.5–5.3)
RBC # BLD AUTO: 3 MILLION/UL (ref 3.88–5.62)
RIGHT ATRIUM AREA SYSTOLE A4C: 13 CM2
RIGHT VENTRICLE ID DIMENSION: 4.3 CM
SL CV DOP CALC MV VTI RETROGRADE: 169.2 CM
SL CV MV MEAN GRADIENT RETROGRADE: 70 MMHG
SL CV PED ECHO LEFT VENTRICLE DIASTOLIC VOLUME (MOD BIPLANE) 2D: 87 ML
SL CV PED ECHO LEFT VENTRICLE SYSTOLIC VOLUME (MOD BIPLANE) 2D: 54 ML
SODIUM SERPL-SCNC: 135 MMOL/L (ref 136–145)
TRICUSPID VALVE PEAK REGURGITATION VELOCITY: 3.38 M/S
TRICUSPID VALVE S': 0.7 CM/S
TV PEAK GRADIENT: 46 MMHG
WBC # BLD AUTO: 9.05 THOUSAND/UL (ref 4.31–10.16)
Z-SCORE OF LEFT VENTRICULAR DIMENSION IN END SYSTOLE: -2.08

## 2022-01-03 PROCEDURE — 83735 ASSAY OF MAGNESIUM: CPT | Performed by: STUDENT IN AN ORGANIZED HEALTH CARE EDUCATION/TRAINING PROGRAM

## 2022-01-03 PROCEDURE — 85027 COMPLETE CBC AUTOMATED: CPT | Performed by: STUDENT IN AN ORGANIZED HEALTH CARE EDUCATION/TRAINING PROGRAM

## 2022-01-03 PROCEDURE — 94760 N-INVAS EAR/PLS OXIMETRY 1: CPT

## 2022-01-03 PROCEDURE — 80048 BASIC METABOLIC PNL TOTAL CA: CPT | Performed by: STUDENT IN AN ORGANIZED HEALTH CARE EDUCATION/TRAINING PROGRAM

## 2022-01-03 PROCEDURE — 93306 TTE W/DOPPLER COMPLETE: CPT | Performed by: INTERNAL MEDICINE

## 2022-01-03 PROCEDURE — 99232 SBSQ HOSP IP/OBS MODERATE 35: CPT | Performed by: HOSPITALIST

## 2022-01-03 PROCEDURE — 36415 COLL VENOUS BLD VENIPUNCTURE: CPT | Performed by: STUDENT IN AN ORGANIZED HEALTH CARE EDUCATION/TRAINING PROGRAM

## 2022-01-03 PROCEDURE — C8929 TTE W OR WO FOL WCON,DOPPLER: HCPCS

## 2022-01-03 RX ADMIN — DOCUSATE SODIUM 100 MG: 100 CAPSULE, LIQUID FILLED ORAL at 09:10

## 2022-01-03 RX ADMIN — LEVETIRACETAM 250 MG: 250 TABLET, FILM COATED ORAL at 19:42

## 2022-01-03 RX ADMIN — FUROSEMIDE 40 MG: 10 INJECTION, SOLUTION INTRAMUSCULAR; INTRAVENOUS at 16:37

## 2022-01-03 RX ADMIN — LEVETIRACETAM 250 MG: 250 TABLET, FILM COATED ORAL at 09:10

## 2022-01-03 RX ADMIN — STANDARDIZED SENNA CONCENTRATE 8.6 MG: 8.6 TABLET ORAL at 09:10

## 2022-01-03 RX ADMIN — PERFLUTREN 0.6 ML/MIN: 6.52 INJECTION, SUSPENSION INTRAVENOUS at 16:10

## 2022-01-03 RX ADMIN — HEPARIN SODIUM 5000 UNITS: 5000 INJECTION INTRAVENOUS; SUBCUTANEOUS at 16:37

## 2022-01-03 RX ADMIN — THIAMINE HCL TAB 100 MG 100 MG: 100 TAB at 09:10

## 2022-01-03 RX ADMIN — NICOTINE 1 PATCH: 14 PATCH, EXTENDED RELEASE TRANSDERMAL at 09:10

## 2022-01-03 RX ADMIN — HEPARIN SODIUM 5000 UNITS: 5000 INJECTION INTRAVENOUS; SUBCUTANEOUS at 05:06

## 2022-01-03 RX ADMIN — FUROSEMIDE 40 MG: 10 INJECTION, SOLUTION INTRAMUSCULAR; INTRAVENOUS at 09:11

## 2022-01-03 RX ADMIN — METOPROLOL SUCCINATE 25 MG: 25 TABLET, FILM COATED, EXTENDED RELEASE ORAL at 09:10

## 2022-01-03 RX ADMIN — DOCUSATE SODIUM 100 MG: 100 CAPSULE, LIQUID FILLED ORAL at 19:42

## 2022-01-03 NOTE — CASE MANAGEMENT
Case Management Assessment & Discharge Planning Note    Patient name Tin Edouard  Location ED 20/ED 20 MRN [id number]  : 1927 Date 1/3/2022       Current Admission Date: 2022  Current Admission Diagnosis:Acute systolic heart failure [facility])   Patient Active Problem List    Diagnosis Date Noted    Acute systolic heart failure ([facility] ) 2022    Acute on chronic respiratory failure with hypoxia ([facility] ) 2022    SIRS (systemic inflammatory response syndrome) ([facility] ) 2022    Stage 3b chronic kidney disease ([facility] ) 2021    Seizure ([facility] ) 2020    Acute gout involving toe of left foot 2020    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, or chronic kidney disease ([facility] ) 2020    CAD (coronary artery disease) 2016    Actinic keratosis 2016    Hypertension 2016    Hyperlipidemia 2016    Benign prostatic hyperplasia 2016      LOS (days): 1  Geometric Mean LOS (GMLOS) (days):   Days to GMLOS:     OBJECTIVE:    Risk of Unplanned Readmission Score: 13         Current admission status: Inpatient       Preferred Pharmacy:   [address] [place] [address] 9293 R R 1 ([phone] R R 1 ([address])  [facility]  Phone: [phone] Fax: [phone]    [place] [address] [address] [facility] [address]  Phone: [phone] Fax: [phone] [address] [place] [address]  Phone: [phone] Fax: [phone]    Primary Care Provider: Estevan Marrufo DO    Primary Insurance: MEDICARE  Secondary Insurance: COMMERCIAL MISCELLANEOUS    ASSESSMENT:  [address] - Daughter   Primary Phone: [phone] [facility])  Mobile Phone: [phone]               Advance Directives  Does patient have a [address] [facility] [address]?: Yes  Does patient have Advance Directives?: Yes  Advance Directives: Living will,Power of  for health care,Power of  for finance  Primary Contact: Patient's Daughter    Readmission Root Cause  30 Day Readmission: No    Patient Information  Admitted from[de-identified] Home  Mental Status: Alert  During Assessment patient was accompanied by: Daughter  Assessment information provided by[de-identified] Daughter  Primary Caregiver: Private caregiver  Support Systems: [address] [facility] of Residence: [place] [address] do you live in?: [address] [facility] entry access options   Select all that apply : No steps to enter home  Type of Current Residence: 2 story home  Upon entering residence, is there a bedroom on the main floor (no further steps)?: Yes  Upon entering residence, is there a bathroom on the main floor (no further steps)?: Yes  In the last 12 months, was there a time when you were not able to pay the mortgage or rent on time?: No  In the last 12 months, how many places have you lived?: 1  In the last 12 months, was there a time when you did not have a steady place to sleep or slept in a shelter (including now)?: No  Homeless/housing insecurity resource given?: N/A  Living Arrangements: Lives w/ Daughter  Is patient a ?: No    Activities of Daily Living Prior to Admission  Functional Status: Assistance  Completes ADLs independently?: No  Level of ADL dependence: Assistance  Ambulates independently?: No  Level of ambulatory dependence: Assistance  Does patient use assisted devices?: Yes  Assisted Devices (DME) used: [employer]  Does patient currently own DME?: Yes  What DME does the patient currently own?: Steven Beckford  Does patient have a history of Outpatient Therapy (PT/OT)?: No  Does the patient have a history of Short-Term Rehab?: Yes (Patient has Hx at [facility] acute )  Does patient have a history of HHC?: Yes (Patient has [place] [address] Hx with [facility] )  Does patient currently have [place] [address]?: Yes    Current Home Health Care  Type of Current Home Care Services: Home health aide (Patient has a private hire caregiver from Monday through Friday from 9 am to 2 pm )    Patient Information Continued  Income Source: Pension/MCFP  Does patient have prescription coverage?: Yes (Patient uses [facility] in [facility]  Daughter denied any barriers to obtaining or affording prescriptions )  Within the past 12 months, you worried that your food would run out before you got the money to buy more : Never true  Within the past 12 months, the food you bought just didnt last and you didnt have money to get more : Never true  Food insecurity resource given?: N/A  Does patient receive dialysis treatments?: No  Does patient have a history of substance abuse?: Currently using (Patient drinks 1-2 beers per night )  Current substance use preference: Alcohol/ETOH  Is patient currently in treatment for substance abuse?: No  Patient declined treatment information  Does patient have a history of Mental Health Diagnosis?: No    Means of Transportation  [facility][de-identified] Family transport  In the past 12 months, has lack of transportation kept you from medical appointments or from getting medications?: No  In the past 12 months, has lack of transportation kept you from meetings, work, or from getting things needed for daily living?: No  Was application for public transport provided?: N/A        DISCHARGE DETAILS:    Discharge planning discussed with[de-identified] Patient's Daughter  Freedom of Choice: Yes  Comments - Freedom of Choice: CM discussed DC needs  Daughter reported that they would likely refuse STR if recommended  However, daughter would be agreeable to [place] [address] and prefers [facility]    CM contacted family/caregiver?: Yes  Were Treatment Team discharge recommendations reviewed with patient/caregiver?: Yes  Did patient/caregiver verbalize understanding of patient care needs?: Yes  Were patient/caregiver advised of the risks associated with not following Treatment Team discharge recommendations?: Yes    Contacts  Patient Contacts: Jefry Villanueva  Relationship to Patient[de-identified] Family  Contact Method: Phone  Phone Number: Daughter called CM  Reason/Outcome: Continuity of [address]         Is the patient interested in [facility] at discharge?: Yes  [address] requested[de-identified] [facility][de-identified] [address] Provider[de-identified] PCP  Home Health Services Needed[de-identified] Evaluate Functional Status and Safety,Strengthening/Theraputic Exercises to Improve Function,Heart Failure Management  Homebound Criteria Met[de-identified] Uses an Assist Device (i e  cane, walker, etc),Requires the Assistance of Another Person for Safe Ambulation or to Leave the Home  Supporting Clincal Findings[de-identified] Limited Endurance,Fatigues Easliy in Short Distances    DME Referral Provided  Referral made for DME?: No    Other Referral/Resources/Interventions Provided:  Interventions: [facility]  Referral Comments: CM sent a referral to [facility] to determine if they can resume care at discharge      Would you like to participate in our [address] service program?  : No - Declined    Treatment Team Recommendation: Home  Discharge Destination Plan[de-identified] Home with [place] at Discharge : Family

## 2022-01-03 NOTE — ASSESSMENT & PLAN NOTE
Wt Readings from Last 3 Encounters:   12/10/21 80 3 kg (177 lb)   08/31/21 75 4 kg (166 lb 3 2 oz)   04/16/21 73 kg (161 lb)     Patient sees a cardiologist at [address]    He can review their notes through Care everywhere    Last echocardiogram was 2020 and showed an LVEF of 40-45%    It is concerning that the patient is having much more dyspnea on exertion per the daughter    We are diuresing him with IV Lasix    Check an echocardiogram as it has been over a year since his last one to see if anything has changed    Consult Cardiology

## 2022-01-03 NOTE — CASE MANAGEMENT
Case Management Progress Note    Patient name Christophe De La Torre  Location ED 20/ED 20 MRN [id number]  : 1927 Date 1/3/2022       LOS (days): 1  Geometric Mean LOS (GMLOS) (days):   Days to GMLOS:        OBJECTIVE:        Current admission status: Inpatient  Preferred Pharmacy:   [id number] R R 1 [phone] R R 1 ([address])  [facility]  Phone: [phone] Fax: [phone]    [address] [facility] [address]  Phone: [phone] Fax: [phone] [address] [address]  Phone: [phone] Fax: [phone]    Primary Care Provider: Eliseo Jin DO    Primary Insurance: MEDICARE  Secondary Insurance: COMMERCIAL MISCELLANEOUS    PROGRESS NOTE:    CM attempted to call patient's daughter at [phone] and [phone] and left a message requesting a call back  CM also attempted to call patient's son Alex Meier at [phone] and left a message requesting a call back

## 2022-01-03 NOTE — PLAN OF CARE
Problem: PAIN - ADULT  Goal: Verbalizes/displays adequate comfort level or baseline comfort level  Description: Interventions:  - Encourage patient to monitor pain and request assistance  - Assess pain using appropriate pain scale  - Administer analgesics based on type and severity of pain and evaluate response  - Implement non-pharmacological measures as appropriate and evaluate response  - Consider cultural and social influences on pain and pain management  - Notify physician/advanced practitioner if interventions unsuccessful or patient reports new pain  Outcome: Progressing     Problem: INFECTION - ADULT  Goal: Absence or prevention of progression during hospitalization  Description: INTERVENTIONS:  - Assess and monitor for signs and symptoms of infection  - Monitor lab/diagnostic results  - Monitor all insertion sites, i e  indwelling lines, tubes, and drains  - Monitor endotracheal if appropriate and nasal secretions for changes in amount and color  - [place] appropriate cooling/warming therapies per order  - Administer medications as ordered  - Instruct and encourage patient and family to use good hand hygiene technique  - Identify and instruct in appropriate isolation precautions for identified infection/condition  Outcome: Progressing

## 2022-01-03 NOTE — ASSESSMENT & PLAN NOTE
Lab Results   Component Value Date    EGFR 34 01/03/2022    EGFR 33 01/02/2022    EGFR 33 09/29/2021    CREATININE 1 66 (H) 01/03/2022    CREATININE 1 71 (H) 01/02/2022    CREATININE 1 73 (H) 09/29/2021   ·     Follow Cr closely while on IV lasix

## 2022-01-03 NOTE — CASE MANAGEMENT
Case Management Progress Note    Patient name Sanjuanita Berumen  Location ED 20/ED 20 MRN [id number]  : 1927 Date 1/3/2022       LOS (days): 1  Geometric Mean LOS (GMLOS) (days):   Days to GMLOS:        OBJECTIVE:        Current admission status: Inpatient  Preferred Pharmacy:   [address] [facility] 9293 R R 1 [phone] R R 1 ([address])  [address] [address] [place] [address]  Phone: [phone] Fax: [phone]    [address] [place], [place]  [facility] [address]  Phone: [phone] Fax: [phone] [address] [address]  Phone: [phone] Fax: [phone]    Primary Care Provider: Leny Dow DO    Primary Insurance: MEDICARE  Secondary Insurance: COMMERCIAL MISCELLANEOUS    PROGRESS NOTE:    CM received TT from Megha Spears with [address] [place] [address]  asking for a decision from [employer]  CM called Benny at [phone] and spoke to [employer]FFAN[employer]OR[employer]  [employer] reported that she will review clinicals now and call CM back with their determination  CM to inform Megha Spears from [address] [place] [address]  once Benny has a decision  CM department will continue to follow patient through discharge

## 2022-01-03 NOTE — CASE MANAGEMENT
Case Management Progress Note    Patient name Seth Metz  Location /-37 MRN [id number]  : 1927 Date 1/3/2022       LOS (days): 1  Geometric Mean LOS (GMLOS) (days):   Days to GMLOS:        OBJECTIVE:        Current admission status: Inpatient  Preferred Pharmacy:   [address] [facility] 9293 R R 1 [phone] R R 1 ([address])  [facility]  Phone: [phone] Fax: [phone]    [address] [facility] [address]  Phone: [phone] Fax: [phone] [address] [address]  Phone: [phone] Fax: [phone]    Primary Care Provider: Don Siddiqui DO    Primary Insurance: MEDICARE  Secondary Insurance: COMMERCIAL MISCELLANEOUS    PROGRESS NOTE:    Per [employer], Benny Pan 78 is able to accept with the [facility]  vs     CM miles Ortega from [facility] via TT and updated the AVS

## 2022-01-03 NOTE — PROGRESS NOTES
[facility]  Progress Note Khadar Hampton 11/25/1927, 80 y o  male MRN: [id number]  Unit/Bed#: ED 20 Encounter: [id number]  Primary Care Provider: Lucero Diaz DO   Date and time admitted to hospital: 1/2/2022 12:51 PM    * Acute systolic heart failure ([address] )  Assessment & Plan  Wt Readings from Last 3 Encounters:   12/10/21 80 3 kg (177 lb)   08/31/21 75 4 kg (166 lb 3 2 oz)   04/16/21 73 kg (161 lb)     Patient sees a cardiologist at [address]    He can review their notes through Care everywhere    Last echocardiogram was 2020 and showed an LVEF of 40-45%    It is concerning that the patient is having much more dyspnea on exertion per the daughter    We are diuresing him with IV Lasix    Check an echocardiogram as it has been over a year since his last one to see if anything has changed    Consult Cardiology    Stage 3b chronic kidney disease [facility])  Assessment & Plan  Lab Results   Component Value Date    EGFR 34 01/03/2022    EGFR 33 01/02/2022    EGFR 33 09/29/2021    CREATININE 1 66 (H) 01/03/2022    CREATININE 1 71 (H) 01/02/2022    CREATININE 1 73 (H) 09/29/2021   ·     Follow Cr closely while on IV lasix    CAD (coronary artery disease)  Assessment & Plan  History of CABG            Subjective:   Patient states he is feeling better  But still not at baseline  He is on 4 L oxygen and does not usually wear oxygen at home  But he states overall shortness of breath and weakness is better  He has no leg swelling    Daughter at the bedside relates that the patient has been having more dyspnea on exertion especially when climbing steps in the last few weeks than he ever has before  He has not had any chest pain    No cough no fever      Objective:     Vitals:   No data recorded  HR:  [] 88  Resp:  [18-26] 20  BP: (112-128)/(56-83) 128/65  SpO2:  [90 %-96 %] 95 %  There is no height or weight on file to calculate BMI       Input and Output Summary (last 24 hours):     No intake or output data in the 24 hours ending 01/03/22 1405    Physical Exam:     Physical Exam  Vitals and nursing note reviewed  HENT:      Head: Normocephalic and atraumatic  Eyes:      Pupils: Pupils are equal, round, and reactive to light  Cardiovascular:      Rate and Rhythm: Normal rate and regular rhythm  Heart sounds: No murmur heard  No friction rub  No gallop  Pulmonary:      Breath sounds: No wheezing or rales  Comments: Decreased breath sounds bilateral bases only  Abdominal:      General: Bowel sounds are normal       Palpations: Abdomen is soft  Tenderness: There is no abdominal tenderness  Musculoskeletal:      Right lower leg: No edema  Left lower leg: No edema          Additional Data:     Labs:    Results from last 7 days   Lab Units 01/03/22  0506 01/02/22  1329 01/02/22  1329   WBC Thousand/uL 9 05   < > 16 92*   HEMOGLOBIN g/dL 9 9*   < > 11 1*   HEMATOCRIT % 29 5*   < > 33 5*   PLATELETS Thousands/uL 329   < > 395*   NEUTROS PCT %  --   --  88*   LYMPHS PCT %  --   --  6*   MONOS PCT %  --   --  5   EOS PCT %  --   --  1    < > = values in this interval not displayed  Results from last 7 days   Lab Units 01/03/22  0506 01/02/22  1329 01/02/22  1329   POTASSIUM mmol/L 3 9   < > 4 6   CHLORIDE mmol/L 98*   < > 95*   CO2 mmol/L 21   < > 16*   BUN mg/dL 26*   < > 29*   CREATININE mg/dL 1 66*   < > 1 71*   CALCIUM mg/dL 9 3   < > 9 6   ALK PHOS U/L  --   --  75   ALT U/L  --   --  32   AST U/L  --   --  22    < > = values in this interval not displayed       Results from last 7 days   Lab Units 01/02/22  1426   INR  1 10                   * I Have Reviewed All Lab Data     Recent Cultures (last 7 days):             Last 24 Hours Medication List:   Current Facility-Administered Medications   Medication Dose Route Frequency Provider Last Rate    acetaminophen  650 mg Oral Q6H PRN Aggie Regalado MD      calcium carbonate  1,000 mg Oral Daily PRN Johanne Mijares MD      docusate sodium  100 mg Oral BID Johanne Mijares MD      furosemide  40 mg Intravenous BID (diuretic) Johanne Mijares MD      heparin (porcine)  5,000 Units Subcutaneous [facility] Johanne Mijares MD      levETIRAcetam  250 mg Oral BID Johanne Mijares MD      metoprolol succinate  25 mg Oral Daily Johanne Mijares MD      nicotine  1 patch Transdermal Daily Johanne Mijares MD      ondansetron  4 mg Intravenous Q6H PRN Johanne Mijares MD      senna  1 tablet Oral Daily Johanne Mijares MD      thiamine  100 mg Oral Daily Johanne Mijares MD           VTE Pharmacologic Prophylaxis:   Pharmacologic: Heparin      Current Length of Stay: 1 day(s)    Current Patient Status: Inpatient       Discharge Plan: home in 2 to 3 days after lasix diuresis    Code Status: Level 1 - Full Code           Today, Patient Was Seen By: Jaleel Luciano DO    ** Please Note: Dictation voice to text software may have been used in the creation of this document   **

## 2022-01-04 ENCOUNTER — EPISODE CHANGES (OUTPATIENT)
Dept: CASE MANAGEMENT | Facility: OTHER | Age: 87
End: 2022-01-04

## 2022-01-04 LAB
ANION GAP SERPL CALCULATED.3IONS-SCNC: 18 MMOL/L (ref 4–13)
BASOPHILS # BLD AUTO: 0.05 THOUSANDS/ΜL (ref 0–0.1)
BASOPHILS NFR BLD AUTO: 1 % (ref 0–1)
BUN SERPL-MCNC: 31 MG/DL (ref 5–25)
CALCIUM SERPL-MCNC: 9.6 MG/DL (ref 8.3–10.1)
CHLORIDE SERPL-SCNC: 95 MMOL/L (ref 100–108)
CO2 SERPL-SCNC: 20 MMOL/L (ref 21–32)
CREAT SERPL-MCNC: 1.69 MG/DL (ref 0.6–1.3)
EOSINOPHIL # BLD AUTO: 0.37 THOUSAND/ΜL (ref 0–0.61)
EOSINOPHIL NFR BLD AUTO: 4 % (ref 0–6)
ERYTHROCYTE [DISTWIDTH] IN BLOOD BY AUTOMATED COUNT: 12.8 % (ref 11.6–15.1)
GFR SERPL CREATININE-BSD FRML MDRD: 34 ML/MIN/1.73SQ M
GLUCOSE SERPL-MCNC: 113 MG/DL (ref 65–140)
HCT VFR BLD AUTO: 32.7 % (ref 36.5–49.3)
HGB BLD-MCNC: 10.8 G/DL (ref 12–17)
IMM GRANULOCYTES # BLD AUTO: 0.05 THOUSAND/UL (ref 0–0.2)
IMM GRANULOCYTES NFR BLD AUTO: 1 % (ref 0–2)
LYMPHOCYTES # BLD AUTO: 1.01 THOUSANDS/ΜL (ref 0.6–4.47)
LYMPHOCYTES NFR BLD AUTO: 10 % (ref 14–44)
MAGNESIUM SERPL-MCNC: 1.8 MG/DL (ref 1.6–2.6)
MCH RBC QN AUTO: 32.5 PG (ref 26.8–34.3)
MCHC RBC AUTO-ENTMCNC: 33 G/DL (ref 31.4–37.4)
MCV RBC AUTO: 99 FL (ref 82–98)
MONOCYTES # BLD AUTO: 0.85 THOUSAND/ΜL (ref 0.17–1.22)
MONOCYTES NFR BLD AUTO: 9 % (ref 4–12)
NEUTROPHILS # BLD AUTO: 7.51 THOUSANDS/ΜL (ref 1.85–7.62)
NEUTS SEG NFR BLD AUTO: 75 % (ref 43–75)
NRBC BLD AUTO-RTO: 0 /100 WBCS
PLATELET # BLD AUTO: 398 THOUSANDS/UL (ref 149–390)
PMV BLD AUTO: 9.4 FL (ref 8.9–12.7)
POTASSIUM SERPL-SCNC: 3.5 MMOL/L (ref 3.5–5.3)
RBC # BLD AUTO: 3.32 MILLION/UL (ref 3.88–5.62)
SODIUM SERPL-SCNC: 133 MMOL/L (ref 136–145)
WBC # BLD AUTO: 9.84 THOUSAND/UL (ref 4.31–10.16)

## 2022-01-04 PROCEDURE — 83735 ASSAY OF MAGNESIUM: CPT | Performed by: HOSPITALIST

## 2022-01-04 PROCEDURE — 85025 COMPLETE CBC W/AUTO DIFF WBC: CPT | Performed by: HOSPITALIST

## 2022-01-04 PROCEDURE — 80048 BASIC METABOLIC PNL TOTAL CA: CPT | Performed by: HOSPITALIST

## 2022-01-04 PROCEDURE — 99223 1ST HOSP IP/OBS HIGH 75: CPT | Performed by: INTERNAL MEDICINE

## 2022-01-04 PROCEDURE — 99233 SBSQ HOSP IP/OBS HIGH 50: CPT | Performed by: INTERNAL MEDICINE

## 2022-01-04 RX ORDER — PRAVASTATIN SODIUM 80 MG/1
80 TABLET ORAL
Status: DISCONTINUED | OUTPATIENT
Start: 2022-01-04 | End: 2022-01-08 | Stop reason: HOSPADM

## 2022-01-04 RX ORDER — ASPIRIN 81 MG/1
81 TABLET ORAL DAILY
Status: DISCONTINUED | OUTPATIENT
Start: 2022-01-04 | End: 2022-01-08 | Stop reason: HOSPADM

## 2022-01-04 RX ADMIN — LEVETIRACETAM 250 MG: 250 TABLET, FILM COATED ORAL at 17:17

## 2022-01-04 RX ADMIN — LEVETIRACETAM 250 MG: 250 TABLET, FILM COATED ORAL at 09:32

## 2022-01-04 RX ADMIN — FUROSEMIDE 40 MG: 10 INJECTION, SOLUTION INTRAMUSCULAR; INTRAVENOUS at 17:17

## 2022-01-04 RX ADMIN — ASPIRIN 81 MG: 81 TABLET, COATED ORAL at 12:48

## 2022-01-04 RX ADMIN — DOCUSATE SODIUM 100 MG: 100 CAPSULE, LIQUID FILLED ORAL at 09:32

## 2022-01-04 RX ADMIN — PRAVASTATIN SODIUM 80 MG: 80 TABLET ORAL at 17:17

## 2022-01-04 RX ADMIN — HEPARIN SODIUM 5000 UNITS: 5000 INJECTION INTRAVENOUS; SUBCUTANEOUS at 12:53

## 2022-01-04 RX ADMIN — NICOTINE 1 PATCH: 14 PATCH, EXTENDED RELEASE TRANSDERMAL at 09:00

## 2022-01-04 RX ADMIN — DOCUSATE SODIUM 100 MG: 100 CAPSULE, LIQUID FILLED ORAL at 17:17

## 2022-01-04 RX ADMIN — HEPARIN SODIUM 5000 UNITS: 5000 INJECTION INTRAVENOUS; SUBCUTANEOUS at 21:36

## 2022-01-04 RX ADMIN — THIAMINE HCL TAB 100 MG 100 MG: 100 TAB at 09:32

## 2022-01-04 RX ADMIN — STANDARDIZED SENNA CONCENTRATE 8.6 MG: 8.6 TABLET ORAL at 09:32

## 2022-01-04 NOTE — ASSESSMENT & PLAN NOTE
- presents with generalized weakness, cough, shortness of breath  - follows with Cardiology at [facility] in [place]  - last echo in 2020 showed an LV EF of 40-45%; repeat echocardiogram here shows similar EF of 35-40% with moderate global hypokinesis and grade 1 diastolic dysfunction  Moderate to severe mitral regurgitation also noted      - continue with IV Lasix 40 mg b i d   - cont metoprolol; no ACE/ARB due to CKD  - monitor on telemetry  - low-salt diet  - daily weights  - monitor intake/output  - appreciate Cardiology recommendations

## 2022-01-04 NOTE — PLAN OF CARE
Problem: MOBILITY - ADULT  Goal: Maintain or return to baseline ADL function  Description: INTERVENTIONS:  -  Assess patient's ability to carry out ADLs; assess patient's baseline for ADL function and identify physical deficits which impact ability to perform ADLs (bathing, care of mouth/teeth, toileting, grooming, dressing, etc )  - Assess/evaluate cause of self-care deficits   - Assess range of motion  - Assess patient's mobility; develop plan if impaired  - Assess patient's need for assistive devices and provide as appropriate  - Encourage maximum independence but intervene and supervise when necessary  - Involve family in performance of ADLs  - Assess for home care needs following discharge   - Consider OT consult to assist with ADL evaluation and planning for discharge  - Provide patient education as appropriate  Outcome: Progressing  Goal: Maintains/Returns to pre admission functional level  Description: INTERVENTIONS:  - Perform BMAT or MOVE assessment daily    - Set and communicate daily mobility goal to care team and patient/family/caregiver  - Collaborate with rehabilitation services on mobility goals if consulted  - Perform Range of Motion 2 times a day  - Reposition patient every 2 hours    - Dangle patient 2 times a day  - Stand patient 2 times a day  - Ambulate patient 2 times a day  - Out of bed to chair 2 times a day   - Out of bed for meals 2 times a day  - Out of bed for toileting  - Record patient progress and toleration of activity level   Outcome: Progressing     Problem: PAIN - ADULT  Goal: Verbalizes/displays adequate comfort level or baseline comfort level  Description: Interventions:  - Encourage patient to monitor pain and request assistance  - Assess pain using appropriate pain scale  - Administer analgesics based on type and severity of pain and evaluate response  - Implement non-pharmacological measures as appropriate and evaluate response  - Consider cultural and social influences on pain and pain management  - Notify physician/advanced practitioner if interventions unsuccessful or patient reports new pain  Outcome: Progressing     Problem: INFECTION - ADULT  Goal: Absence or prevention of progression during hospitalization  Description: INTERVENTIONS:  - Assess and monitor for signs and symptoms of infection  - Monitor lab/diagnostic results  - Monitor all insertion sites, i e  indwelling lines, tubes, and drains  - Monitor endotracheal if appropriate and nasal secretions for changes in amount and color  - [place] appropriate cooling/warming therapies per order  - Administer medications as ordered  - Instruct and encourage patient and family to use good hand hygiene technique  - Identify and instruct in appropriate isolation precautions for identified infection/condition  Outcome: Progressing  Goal: Absence of fever/infection during neutropenic period  Description: INTERVENTIONS:  - Monitor WBC    Outcome: Progressing     Problem: SAFETY ADULT  Goal: Maintain or return to baseline ADL function  Description: INTERVENTIONS:  -  Assess patient's ability to carry out ADLs; assess patient's baseline for ADL function and identify physical deficits which impact ability to perform ADLs (bathing, care of mouth/teeth, toileting, grooming, dressing, etc )  - Assess/evaluate cause of self-care deficits   - Assess range of motion  - Assess patient's mobility; develop plan if impaired  - Assess patient's need for assistive devices and provide as appropriate  - Encourage maximum independence but intervene and supervise when necessary  - Involve family in performance of ADLs  - Assess for home care needs following discharge   - Consider OT consult to assist with ADL evaluation and planning for discharge  - Provide patient education as appropriate  Outcome: Progressing    Goal: Patient will remain free of falls  Description: INTERVENTIONS:  - Educate patient/family on patient safety including physical limitations  - Instruct patient to call for assistance with activity   - Consult OT/PT to assist with strengthening/mobility   - Keep Call bell within reach  - Keep bed low and locked with side rails adjusted as appropriate  - Keep care items and personal belongings within reach  - Initiate and maintain comfort rounds  - Make Fall Risk Sign visible to staff  - Offer Toileting every 2 Hours, in advance of need  - Initiate/Maintain bedalarm  - Obtain necessary fall risk management equipment:   - Apply yellow socks and bracelet for high fall risk patients  - Consider moving patient to room near nurses station  Outcome: Progressing     Problem: DISCHARGE PLANNING  Goal: Discharge to home or other facility with appropriate resources  Description: INTERVENTIONS:  - Identify barriers to discharge w/patient and caregiver  - Arrange for needed discharge resources and transportation as appropriate  - Identify discharge learning needs (meds, wound care, etc )  - Arrange for interpretive services to assist at discharge as needed  - Refer to Case Management Department for coordinating discharge planning if the patient needs post-hospital services based on physician/advanced practitioner order or complex needs related to functional status, cognitive ability, or social support system  Outcome: Progressing     Problem: Prexisting or High Potential for Compromised Skin Integrity  Goal: Skin integrity is maintained or improved  Description: INTERVENTIONS:  - Identify patients at risk for skin breakdown  - Assess and monitor skin integrity  - Assess and monitor nutrition and hydration status  - Monitor labs   - Assess for incontinence   - Turn and reposition patient  - Assist with mobility/ambulation  - Relieve pressure over bony prominences  - Avoid friction and shearing  - Provide appropriate hygiene as needed including keeping skin clean and dry  - Evaluate need for skin moisturizer/barrier cream  - Collaborate with interdisciplinary team   - Patient/family teaching  - Consider wound care consult   Outcome: Progressing     Problem: Nutrition/Hydration-ADULT  Goal: Nutrient/Hydration intake appropriate for improving, restoring or maintaining nutritional needs  Description: Monitor and assess patient's nutrition/hydration status for malnutrition  Collaborate with interdisciplinary team and initiate plan and interventions as ordered  Monitor patient's weight and dietary intake as ordered or per policy  Utilize nutrition screening tool and intervene as necessary  Determine patient's food preferences and provide high-protein, high-caloric foods as appropriate       INTERVENTIONS:  - Monitor oral intake, urinary output, labs, and treatment plans  - Assess nutrition and hydration status and recommend course of action  - Evaluate amount of meals eaten  - Assist patient with eating if necessary   - Allow adequate time for meals  - Recommend/ encourage appropriate diets, oral nutritional supplements, and vitamin/mineral supplements  - Order, calculate, and assess calorie counts as needed  - Recommend, monitor, and adjust tube feedings and TPN/PPN based on assessed needs  - Assess need for intravenous fluids  - Provide specific nutrition/hydration education as appropriate  - Include patient/family/caregiver in decisions related to nutrition  Outcome: Progressing

## 2022-01-04 NOTE — NURSING NOTE
At the start of this RN's shift, patient had tried to void and only urinated 200 ml after about 15 minutes of trying  Patient was bladder scanned and found to have 868 ml still left in bladder  Order was verbally obtained from on call Denise Cuadra, to follow urinary retention protocol  When this RN entered into patients room to preform straight cath, patient had urinated an additional 400 mls  Patients bladder scan after that void was 899  Straight cath was attempted but unsuccessful  This RN met great resistance and patient told RN to stop  Per patients request, condom cath was placed  Since condom cath placement, patient has urinated 550 ml of clear yellow urine  Bladder scan was preformed at this time and there was 464 ml in his bladder  Patient continues to deny additional straight cath attempts  SLIM is aware  Patient has no needs at this time

## 2022-01-04 NOTE — NURSING NOTE
Patients bed alarm was going off when then RN entered the room  Patient was sitting on side of bed visibly frightened  Heart rate was in the 140's  He was attempting to remove all his blankets and stand up because he believed someone was out to get him and kill his family  This RN attempted to reorient patient but he continued to ask his he had hurt me multiple times  I reassured him each time that he had not hurt anyone  He told me to not enter his room alone from now on because he fears he may harm staff  Up until this point, patient has be alert and oriented x4, and a pleasant man  Eventually, patient was calmed down and heart rate returned to 90s and patient was assisted back into bed  He told this RN he must be going crazy and he believes we are not giving him his medications correctly  JUAN was made aware and this RN was instructed to keep her updated on any further changes

## 2022-01-04 NOTE — PROGRESS NOTES
[address] [facility]  Progress Note Patrizia Rae 11/25/1927, 80 y o  male MRN: [id number]  Unit/Bed#: -Ludy Encounter: [id number]  Primary Care Provider: Jennifer Maloney DO   Date and time admitted to hospital: 1/2/2022 12:51 PM    * Acute systolic heart failure ([facility] [address] )  Assessment & Plan  - presents with generalized weakness, cough, shortness of breath  - follows with Cardiology at [facility]  - last echo in 2020 showed an LV EF of 40-45%; repeat echocardiogram here shows similar EF of 35-40% with moderate global hypokinesis and grade 1 diastolic dysfunction  Moderate to severe mitral regurgitation also noted  - continue with IV Lasix 40 mg b i d   - cont metoprolol; no ACE/ARB due to CKD  - monitor on telemetry  - low-salt diet  - daily weights  - monitor intake/output  - appreciate Cardiology recommendations    Acute on chronic respiratory failure with hypoxia ([facility] [address] )  Assessment & Plan  - Secondary to decompensated heart failure  - Continue IV lasix, monitor respiratory status    Stage 3b chronic kidney disease ([facility] [address] )  Assessment & Plan  - renal function at baseline  - monitor closely with daily BMP while on IV diuretics    Seizure ([facility] [address] )  Assessment & Plan  - Continue home dose keppra     Benign prostatic hyperplasia  Assessment & Plan  - Continue home dose flomax    CAD (coronary artery disease)  Assessment & Plan  - history of CABG in 2006 and abnormal stress test in 2020 medically managed    - no acute concerns from the standpoint at this point in time  - continue aspirin, statin, metoprolol       Hyperlipidemia  Assessment & Plan  - continue pravastatin      VTE Pharmacologic Prophylaxis: VTE Score: 7 High Risk (Score >/= 5) - Pharmacological DVT Prophylaxis Ordered: heparin  Sequential Compression Devices Ordered  Patient Centered Rounds: I performed bedside rounds with nursing staff today    Discussions with Specialists or Other Care Team Provider: Case management    Education and Discussions with Family / Patient: Updated  (friend) at bedside  Time Spent for Care: 20 minutes  More than 50% of total time spent on counseling and coordination of care as described above  Current Length of Stay: 2 day(s)  Current Patient Status: Inpatient   Certification Statement: The patient will continue to require additional inpatient hospital stay due to Hypoxic respiratory failure and acute CHF  Discharge Plan: To be determined pending clinical progression    Code Status: Level 1 - Full Code    Subjective:   Patient seen and examined  Following up for CHF exacerbation  Remains on 5 L  States he feels better and his cough is improved  Looks pretty lethargic  Seems a little confused at times  Objective:     Vitals:   Temp (24hrs), Av °F (36 7 °C), Min:97 5 °F (36 4 °C), Max:98 8 °F (37 1 °C)    Temp:  [97 5 °F (36 4 °C)-98 8 °F (37 1 °C)] 97 5 °F (36 4 °C)  HR:  [86-88] 87  Resp:  [16-19] 16  BP: (101-129)/(66-72) 117/67  SpO2:  [96 %-99 %] 96 %  Body mass index is 30 85 kg/m²  Input and Output Summary (last 24 hours): Intake/Output Summary (Last 24 hours) at 2022 1749  Last data filed at 2022 1721  Gross per 24 hour   Intake 680 ml   Output 950 ml   Net -270 ml       Physical Exam:   PHYSICAL EXAM:    Vitals signs reviewed  Constitutional   Awake and cooperative  NAD  Head/Neck   Normocephalic  Atraumatic  HEENT   No scleral icterus  EOMI  Heart   Regular rate and rhythm  No murmers  Lungs   Good air movement  No respiratory distress  Decreased breath sounds at the bases bilaterally  Abdomen   Soft  Nontender  Nondistended  Skin   Skin color normal  No rashes  Extremities   No deformities  No peripheral edema  Neuro   Alert and oriented  No new deficits  Psych   Mood stable   Affect normal          Additional Data:     Labs:  Results from last 7 days   Lab Units 22  0559   WBC Thousand/uL 9 84 HEMOGLOBIN g/dL 10 8*   HEMATOCRIT % 32 7*   PLATELETS Thousands/uL 398*   NEUTROS PCT % 75   LYMPHS PCT % 10*   MONOS PCT % 9   EOS PCT % 4     Results from last 7 days   Lab Units 01/04/22  0559 01/03/22  0506 01/02/22  1329   SODIUM mmol/L 133*   < > 130*   POTASSIUM mmol/L 3 5   < > 4 6   CHLORIDE mmol/L 95*   < > 95*   CO2 mmol/L 20*   < > 16*   BUN mg/dL 31*   < > 29*   CREATININE mg/dL 1 69*   < > 1 71*   ANION GAP mmol/L 18*   < > 19*   CALCIUM mg/dL 9 6   < > 9 6   ALBUMIN g/dL  --   --  3 4*   TOTAL BILIRUBIN mg/dL  --   --  0 49   ALK PHOS U/L  --   --  75   ALT U/L  --   --  32   AST U/L  --   --  22   GLUCOSE RANDOM mg/dL 113   < > 167*    < > = values in this interval not displayed       Results from last 7 days   Lab Units 01/02/22  1426   INR  1 10                   Lines/Drains:  Invasive Devices  Report    Peripheral Intravenous Line            Peripheral IV 01/02/22 Left Arm 2 days    Peripheral IV 01/02/22 Right;Upper 2 days          Drain            External Urinary Catheter Small <1 day                  Telemetry:  Telemetry Orders (From admission, onward)             48 Hour Telemetry Monitoring  Continuous x 48 hours        References:    Telemetry Guidelines   Question:  Reason for 48 Hour Telemetry  Answer:  Acute Decompensated CHF (continuous diuretic infusion or total diuretic dose > 200 mg daily, associated electrolyte derangement, ionotropic drip, history of ventricular arrhythmia, or new EF <35%)                              Imaging: Reviewed radiology reports from this admission including: chest CT scan    Recent Cultures (last 7 days):         Last 24 Hours Medication List:   Current Facility-Administered Medications   Medication Dose Route Frequency Provider Last Rate    acetaminophen  650 mg Oral Q6H PRN Colton Gamez MD      aspirin  81 mg Oral Daily Karyle Mills, CRNP      calcium carbonate  1,000 mg Oral Daily PRN Colton Gamez MD      docusate sodium  100 mg Oral BID Alize Goins MD      furosemide  40 mg Intravenous BID (diuretic) Alize Goins MD      heparin (porcine)  5,000 Units Subcutaneous [facility] Alize Goins MD      levETIRAcetam  250 mg Oral BID Alize Goins MD      metoprolol succinate  25 mg Oral Daily Alize Goins MD      nicotine  1 patch Transdermal Daily Alize Goins MD      ondansetron  4 mg Intravenous Q6H PRN Alize Goins MD      pravastatin  80 mg Oral Daily With JESSICA Marcus      senna  1 tablet Oral Daily Alize Goins MD      thiamine  100 mg Oral Daily Alize Goins MD          Today, Patient Was Seen By: Carmen Moran DO    **Please Note: This note may have been constructed using a voice recognition system  **

## 2022-01-04 NOTE — CONSULTS
Consultation - Cardiology   Adriel Parnell 80 y o  male MRN: [id number]  Unit/Bed#: -01 Encounter: [id number]  01/04/22  1:25 PM    Assessment/ Plan:  1  Acute on chronic HFrEF  - echo this admission shows EF 35-40%, moderate global hypokinesis with regional variation-mild anteroseptum, apical wall appears severely hypokinetic  There is mild concentric hypertrophy, diastolic function is mildly abnormal- Grade 1 diastolic dysfunction  - patient appears fluid overloaded on exam  - NT-proBNP 9,301 on admission  - Continue 40 mg IV Lasix b i d  and metoprolol succinate 25mg daily   - No ACEi/ARB due to CKD     2  CAD s/p CABG in 2006  - No report available from CABG or previous cardiac caths  - Continue aspirin 81mg and metoprolol succinate 25mg  - Patient on simvastatin 40mg daily at home    3  CKD Stage 3  - creatinine 1 69, continue to trend  - Management per primary team     4  Acute mental status change  - Patient confused at this time, baseline mental status unclear    5  Hyperlipidemia  - Takes simvastatin 40mg daily at home, pravastatin 80mg ordered during hospitalization    History of Present Illness   Physician Requesting Consult: Samaria Cervantes, *    Reason for Consult / Principal Problem:  CHF exacerbation    HPI: Adriel Parnell is a 80y o  year old male with PMH of hypertension, dyslipidemia, borderline diabetes, CAD status post CABG with distal left main coronary and 90% ostial LAD stenosis in 2006, enteroapical septal ischemia on prior nuclear stress test at which time cardiac catheterization showed patent grafts with severe left main disease  who presents with worsening shortness of breath over the past several days and lower extremity edema  Patient is currently confused so history was obtained from H&P  Patient follows with cardiologist at [facility]  Patient lives with his daughter  Patient appears confused but comfortable at this time    He did not appear to be in acute distress  Patient denies any chest pain, shortness of breath, and palpitations  Inpatient consult to Cardiology  Consult performed by: JESSICA Hu  Consult ordered by: Andree Xavier DO          EKG: Sinus rhythm with frequent Premature ventricular complexes  Left bundle branch block  Baseline artifact  Confirmed by Cari Boston ([id number]) on 1/2/2022 6:57:17 PM    Echo 01/03/2022    Left Ventricle: Left ventricular cavity size is normal  Wall thickness is mildly increased  Systolic function is severely reduced  Estimated LVEF 35-40%(recommend MUGA scan for accurate LVEF) There is moderate global hypokinesis with regional variation - mid anteroseptum, apical wall appears severely hypokinetic There is mild concentric hypertrophy  Diastolic function is mildly abnormal, consistent with grade I (abnormal) relaxation    IVS: There is abnormal septal motion consistent with left bundle branch block    Left Atrium: The atrium is mildly dilated    Aortic Valve: There is mild regurgitation    Mitral Valve: There is moderate thickening  There is moderate annular calcification  There is moderate to severe regurgitation    Tricuspid Valve: There is mild regurgitation      Review of Systems   Unable to perform ROS: Mental status change   Patient is confused at this time, but does not appear to be in any acute distress    Historical Information   Past Medical History:   Diagnosis Date    Arthritis     CH[facility]congestive heart failure) ([facility] [address] )     Coronary artery disease     Esophageal reflux     History of transfusion     "MANY YEARS AGO"    Hyperlipidemia     Hypertension     Renal disorder     PT DENIES, BUT ON PROBLEM LIST    Stroke ([facility] [address] )     NOT SURE     Past Surgical History:   Procedure Laterality Date    ABDOMINAL SURGERY      BACK SURGERY      CARDIAC SURGERY      COLON SURGERY      CORONARY ARTERY BYPASS GRAFT      FRACTURE SURGERY      TONSILLECTOMY      Last Assessed: 12/5/2016 Social History     Substance and Sexual Activity   Alcohol Use Yes    Alcohol/week: 2 0 standard drinks    Types: 1 Glasses of wine, 1 Cans of beer per week    Comment: daily     Social History     Substance and Sexual Activity   Drug Use No     Social History     Tobacco Use   Smoking Status Former Smoker    Packs/day: 0 25    Years: 80 00    Pack years: 20 00    Types: Cigars, Cigarettes    Quit date: 1950    Years since quittin 7   Smokeless Tobacco Current User   Tobacco Comment    occasional-cigar       Family History:   Family History   Problem Relation Age of Onset    Cancer Mother     Stroke Father        Meds/Allergies   all current active meds have been reviewed and current meds:   Current Facility-Administered Medications   Medication Dose Route Frequency    acetaminophen (TYLENOL) tablet 650 mg  650 mg Oral Q6H PRN    aspirin (ECOTRIN LOW STRENGTH) EC tablet 81 mg  81 mg Oral Daily    calcium carbonate (TUMS) chewable tablet 1,000 mg  1,000 mg Oral Daily PRN    docusate sodium (COLACE) capsule 100 mg  100 mg Oral BID    furosemide (LASIX) injection 40 mg  40 mg Intravenous BID (diuretic)    heparin (porcine) subcutaneous injection 5,000 Units  5,000 Units Subcutaneous Q8H [address]    levETIRAcetam (KEPPRA) tablet 250 mg  250 mg Oral BID    metoprolol succinate (TOPROL-XL) 24 hr tablet 25 mg  25 mg Oral Daily    nicotine (NICODERM CQ) 14 mg/24hr TD 24 hr patch 1 patch  1 patch Transdermal Daily    ondansetron (ZOFRAN) injection 4 mg  4 mg Intravenous Q6H PRN    senna (SENOKOT) tablet 8 6 mg  1 tablet Oral Daily    thiamine tablet 100 mg  100 mg Oral Daily     Allergies   Allergen Reactions    Penicillamine Rash    Penicillins Rash     Rash on hands    Morphine Other (See Comments)     Low blood pressure       Objective   Vitals: Blood pressure 101/66, pulse 88, temperature 98 8 °F (37 1 °C), resp  rate 18, height 5' 3" (1 6 m), weight 79 kg (174 lb 2 6 oz), SpO2 99 %  , Body mass index is 30 85 kg/m² ,   Orthostatic Blood Pressures      Most Recent Value   Blood Pressure 101/66 filed at 01/04/2022 9613   Patient Position - Orthostatic VS Lying filed at 01/03/2022 0067          Systolic (40BCV), LUKASZ:057 , Min:101 , SJS:439     Diastolic (68OWR), EXI:47, Min:62, Max:72        Intake/Output Summary (Last 24 hours) at 1/4/2022 1325  Last data filed at 1/4/2022 0300  Gross per 24 hour   Intake 480 ml   Output 950 ml   Net -470 ml       Invasive Devices  Report    Peripheral Intravenous Line            Peripheral IV 01/02/22 Left Arm 1 day    Peripheral IV 01/02/22 Right;Upper 1 day          Drain            External Urinary Catheter Small <1 day                    Physical Exam:  Physical Exam  Vitals and nursing note reviewed  Constitutional:       Appearance: He is well-developed  HENT:      Head: Normocephalic and atraumatic  Eyes:      Conjunctiva/sclera: Conjunctivae normal    Cardiovascular:      Rate and Rhythm: Normal rate and regular rhythm  Heart sounds: Normal heart sounds  No murmur heard  Pulmonary:      Effort: Pulmonary effort is normal  No respiratory distress  Breath sounds: Decreased air movement (bases) present  Abdominal:      Palpations: Abdomen is soft  Tenderness: There is no abdominal tenderness  Musculoskeletal:      Cervical back: Neck supple  Right lower leg: No edema  Left lower leg: No edema  Skin:     General: Skin is warm and dry  Neurological:      Mental Status: He is alert            Lab Results:     Cardiac Profile:   Results from last 7 days   Lab Units 01/02/22  1825 01/02/22  1546 01/02/22  1329   HS TNI 0HR ng/L  --   --  87*   HS TNI 2HR ng/L  --  184*  --    HSTNI D2 ng/L  --  97  --    HS TNI 4HR ng/L 411*  --   --    HSTNI D4 ng/L 324  --   --    NT-PRO BNP pg/mL  --   --  9,301*       CBC with diff:   Results from last 7 days   Lab Units 01/04/22  0559 01/03/22  0506 01/02/22  1329   WBC Thousand/uL 9  84 9 05 16 92*   HEMOGLOBIN g/dL 10 8* 9 9* 11 1*   HEMATOCRIT % 32 7* 29 5* 33 5*   MCV fL 99* 98 102*   PLATELETS Thousands/uL 398* 329 395*   MCH pg 32 5 33 0 33 7   MCHC g/dL 33 0 33 6 33 1   RDW % 12 8 12 7 12 7   MPV fL 9 4 9 2 9 4   NRBC AUTO /100 WBCs 0  --  0         CMP:   Results from last 7 days   Lab Units 01/04/22  0559 01/03/22  0506 01/02/22  1329   POTASSIUM mmol/L 3 5 3 9 4 6   CHLORIDE mmol/L 95* 98* 95*   CO2 mmol/L 20* 21 16*   BUN mg/dL 31* 26* 29*   CREATININE mg/dL 1 69* 1 66* 1 71*   CALCIUM mg/dL 9 6 9 3 9 6   AST U/L  --   --  22   ALT U/L  --   --  32   ALK PHOS U/L  --   --  75   EGFR ml/min/1 73sq m 34 34 33

## 2022-01-04 NOTE — PLAN OF CARE
Problem: MOBILITY - ADULT  Goal: Maintain or return to baseline ADL function  Description: INTERVENTIONS:  -  Assess patient's ability to carry out ADLs; assess patient's baseline for ADL function and identify physical deficits which impact ability to perform ADLs (bathing, care of mouth/teeth, toileting, grooming, dressing, etc )  - Assess/evaluate cause of self-care deficits   - Assess range of motion  - Assess patient's mobility; develop plan if impaired  - Assess patient's need for assistive devices and provide as appropriate  - Encourage maximum independence but intervene and supervise when necessary  - Involve family in performance of ADLs  - Assess for home care needs following discharge   - Consider OT consult to assist with ADL evaluation and planning for discharge  - Provide patient education as appropriate  Outcome: Progressing  Goal: Maintains/Returns to pre admission functional level  Description: INTERVENTIONS:  - Perform BMAT or MOVE assessment daily    - Set and communicate daily mobility goal to care team and patient/family/caregiver  - Collaborate with rehabilitation services on mobility goals if consulted  - Reposition patient every 2 hours    - Dangle patient 2 times a day  - Stand patient- Ambulate patient - Out of bed to chair   - Out of bed for meals - Out of bed for toileting  - Record patient progress and toleration of activity level   Outcome: Progressing     Problem: PAIN - ADULT  Goal: Verbalizes/displays adequate comfort level or baseline comfort level  Description: Interventions:  - Encourage patient to monitor pain and request assistance  - Assess pain using appropriate pain scale  - Administer analgesics based on type and severity of pain and evaluate response  - Implement non-pharmacological measures as appropriate and evaluate response  - Consider cultural and social influences on pain and pain management  - Notify physician/advanced practitioner if interventions unsuccessful or patient reports new pain  Outcome: Progressing     Problem: INFECTION - ADULT  Goal: Absence or prevention of progression during hospitalization  Description: INTERVENTIONS:  - Assess and monitor for signs and symptoms of infection  - Monitor lab/diagnostic results  - Monitor all insertion sites, i e  indwelling lines, tubes, and drains  - Monitor endotracheal if appropriate and nasal secretions for changes in amount and color  - [place] appropriate cooling/warming therapies per order  - Administer medications as ordered  - Instruct and encourage patient and family to use good hand hygiene technique  - Identify and instruct in appropriate isolation precautions for identified infection/condition  Outcome: Progressing  Goal: Absence of fever/infection during neutropenic period  Description: INTERVENTIONS:  - Monitor WBC    Outcome: Progressing     Problem: SAFETY ADULT  Goal: Maintain or return to baseline ADL function  Description: INTERVENTIONS:  -  Assess patient's ability to carry out ADLs; assess patient's baseline for ADL function and identify physical deficits which impact ability to perform ADLs (bathing, care of mouth/teeth, toileting, grooming, dressing, etc )  - Assess/evaluate cause of self-care deficits   - Assess range of motion  - Assess patient's mobility; develop plan if impaired  - Assess patient's need for assistive devices and provide as appropriate  - Encourage maximum independence but intervene and supervise when necessary  - Involve family in performance of ADLs  - Assess for home care needs following discharge   - Consider OT consult to assist with ADL evaluation and planning for discharge  - Provide patient education as appropriate  Outcome: Progressing  Goal: Maintains/Returns to pre admission functional level  Description: INTERVENTIONS:  - Perform BMAT or MOVE assessment daily    - Set and communicate daily mobility goal to care team and patient/family/caregiver     - Collaborate with rehabilitation services on mobility goals if consulted  - Reposition patient - Dangle patient- Stand patient - Ambulate patient - Out of bed for meals - Out of bed for toileting  - Record patient progress and toleration of activity level   Outcome: Progressing  Goal: Patient will remain free of falls  Description: INTERVENTIONS:  - Educate patient/family on patient safety including physical limitations  - Instruct patient to call for assistance with activity   - Consult OT/PT to assist with strengthening/mobility   - Keep Call bell within reach  - Keep bed low and locked with side rails adjusted as appropriate  - Keep care items and personal belongings within reach  - Initiate and maintain comfort rounds  - Make Fall Risk Sign visible to staff  - Offer Toileting in advance of need  - Initiate/Maintain alarm  - Obtain necessary fall risk management equipment  - Apply yellow socks and bracelet for high fall risk patients  - Consider moving patient to room near nurses station  Outcome: Progressing     Problem: DISCHARGE PLANNING  Goal: Discharge to home or other facility with appropriate resources  Description: INTERVENTIONS:  - Identify barriers to discharge w/patient and caregiver  - Arrange for needed discharge resources and transportation as appropriate  - Identify discharge learning needs (meds, wound care, etc )  - Arrange for interpretive services to assist at discharge as needed  - Refer to Case Management Department for coordinating discharge planning if the patient needs post-hospital services based on physician/advanced practitioner order or complex needs related to functional status, cognitive ability, or social support system  Outcome: Progressing     Problem: Prexisting or High Potential for Compromised Skin Integrity  Goal: Skin integrity is maintained or improved  Description: INTERVENTIONS:  - Identify patients at risk for skin breakdown  - Assess and monitor skin integrity  - Assess and monitor nutrition and hydration status  - Monitor labs   - Assess for incontinence   - Turn and reposition patient  - Assist with mobility/ambulation  - Relieve pressure over bony prominences  - Avoid friction and shearing  - Provide appropriate hygiene as needed including keeping skin clean and dry  - Evaluate need for skin moisturizer/barrier cream  - Collaborate with interdisciplinary team   - Patient/family teaching  - Consider wound care consult   Outcome: Progressing     Problem: Potential for Falls  Goal: Patient will remain free of falls  Description: INTERVENTIONS:  - Educate patient/family on patient safety including physical limitations  - Instruct patient to call for assistance with activity   - Consult OT/PT to assist with strengthening/mobility   - Keep Call bell within reach  - Keep bed low and locked with side rails adjusted as appropriate  - Keep care items and personal belongings within reach  - Initiate and maintain comfort rounds  - Make Fall Risk Sign visible to staff  - Offer Toileting every in advance of need  - Initiate/Maintain alarm  - Obtain necessary fall risk management equipment  - Apply yellow socks and bracelet for high fall risk patients  - Consider moving patient to room near nurses station  Outcome: Progressing     Problem: Nutrition/Hydration-ADULT  Goal: Nutrient/Hydration intake appropriate for improving, restoring or maintaining nutritional needs  Description: Monitor and assess patient's nutrition/hydration status for malnutrition  Collaborate with interdisciplinary team and initiate plan and interventions as ordered  Monitor patient's weight and dietary intake as ordered or per policy  Utilize nutrition screening tool and intervene as necessary  Determine patient's food preferences and provide high-protein, high-caloric foods as appropriate       INTERVENTIONS:  - Monitor oral intake, urinary output, labs, and treatment plans  - Assess nutrition and hydration status and recommend course of action  - Evaluate amount of meals eaten  - Assist patient with eating if necessary   - Allow adequate time for meals  - Recommend/ encourage appropriate diets, oral nutritional supplements, and vitamin/mineral supplements  - Order, calculate, and assess calorie counts as needed  - Recommend, monitor, and adjust tube feedings and TPN/PPN based on assessed needs  - Assess need for intravenous fluids  - Provide specific nutrition/hydration education as appropriate  - Include patient/family/caregiver in decisions related to nutrition  Outcome: Progressing

## 2022-01-04 NOTE — ASSESSMENT & PLAN NOTE
- history of CABG in 2006 and abnormal stress test in 2020 medically managed    - no acute concerns from the standpoint at this point in time  - continue aspirin, statin, metoprolol

## 2022-01-05 LAB
ANION GAP SERPL CALCULATED.3IONS-SCNC: 12 MMOL/L (ref 4–13)
BUN SERPL-MCNC: 31 MG/DL (ref 5–25)
CALCIUM SERPL-MCNC: 9.2 MG/DL (ref 8.3–10.1)
CHLORIDE SERPL-SCNC: 93 MMOL/L (ref 100–108)
CO2 SERPL-SCNC: 26 MMOL/L (ref 21–32)
CREAT SERPL-MCNC: 1.75 MG/DL (ref 0.6–1.3)
GFR SERPL CREATININE-BSD FRML MDRD: 32 ML/MIN/1.73SQ M
GLUCOSE SERPL-MCNC: 95 MG/DL (ref 65–140)
POTASSIUM SERPL-SCNC: 3.6 MMOL/L (ref 3.5–5.3)
SODIUM SERPL-SCNC: 131 MMOL/L (ref 136–145)

## 2022-01-05 PROCEDURE — 99233 SBSQ HOSP IP/OBS HIGH 50: CPT | Performed by: INTERNAL MEDICINE

## 2022-01-05 PROCEDURE — 80048 BASIC METABOLIC PNL TOTAL CA: CPT | Performed by: INTERNAL MEDICINE

## 2022-01-05 RX ORDER — GUAIFENESIN 600 MG
600 TABLET, EXTENDED RELEASE 12 HR ORAL EVERY 12 HOURS SCHEDULED
Status: DISCONTINUED | OUTPATIENT
Start: 2022-01-05 | End: 2022-01-08 | Stop reason: HOSPADM

## 2022-01-05 RX ORDER — BENZONATATE 100 MG/1
100 CAPSULE ORAL 3 TIMES DAILY PRN
Status: DISCONTINUED | OUTPATIENT
Start: 2022-01-05 | End: 2022-01-08 | Stop reason: HOSPADM

## 2022-01-05 RX ORDER — FUROSEMIDE 10 MG/ML
20 INJECTION INTRAMUSCULAR; INTRAVENOUS ONCE
Status: COMPLETED | OUTPATIENT
Start: 2022-01-05 | End: 2022-01-05

## 2022-01-05 RX ORDER — FUROSEMIDE 40 MG/1
40 TABLET ORAL
Status: DISCONTINUED | OUTPATIENT
Start: 2022-01-05 | End: 2022-01-05

## 2022-01-05 RX ORDER — FUROSEMIDE 10 MG/ML
40 INJECTION INTRAMUSCULAR; INTRAVENOUS DAILY
Status: DISCONTINUED | OUTPATIENT
Start: 2022-01-05 | End: 2022-01-05

## 2022-01-05 RX ORDER — FUROSEMIDE 40 MG/1
40 TABLET ORAL DAILY
Status: DISCONTINUED | OUTPATIENT
Start: 2022-01-05 | End: 2022-01-05

## 2022-01-05 RX ADMIN — DOCUSATE SODIUM 100 MG: 100 CAPSULE, LIQUID FILLED ORAL at 18:21

## 2022-01-05 RX ADMIN — ASPIRIN 81 MG: 81 TABLET, COATED ORAL at 09:36

## 2022-01-05 RX ADMIN — THIAMINE HCL TAB 100 MG 100 MG: 100 TAB at 09:36

## 2022-01-05 RX ADMIN — STANDARDIZED SENNA CONCENTRATE 8.6 MG: 8.6 TABLET ORAL at 09:36

## 2022-01-05 RX ADMIN — HEPARIN SODIUM 5000 UNITS: 5000 INJECTION INTRAVENOUS; SUBCUTANEOUS at 14:32

## 2022-01-05 RX ADMIN — GUAIFENESIN 600 MG: 600 TABLET ORAL at 13:01

## 2022-01-05 RX ADMIN — GUAIFENESIN 600 MG: 600 TABLET ORAL at 20:58

## 2022-01-05 RX ADMIN — DOCUSATE SODIUM 100 MG: 100 CAPSULE, LIQUID FILLED ORAL at 09:36

## 2022-01-05 RX ADMIN — BENZONATATE 100 MG: 100 CAPSULE ORAL at 20:58

## 2022-01-05 RX ADMIN — LEVETIRACETAM 250 MG: 250 TABLET, FILM COATED ORAL at 09:36

## 2022-01-05 RX ADMIN — HEPARIN SODIUM 5000 UNITS: 5000 INJECTION INTRAVENOUS; SUBCUTANEOUS at 21:01

## 2022-01-05 RX ADMIN — LEVETIRACETAM 250 MG: 250 TABLET, FILM COATED ORAL at 18:20

## 2022-01-05 RX ADMIN — FUROSEMIDE 40 MG: 40 TABLET ORAL at 13:01

## 2022-01-05 RX ADMIN — PRAVASTATIN SODIUM 80 MG: 80 TABLET ORAL at 16:18

## 2022-01-05 RX ADMIN — FUROSEMIDE 20 MG: 10 INJECTION, SOLUTION INTRAMUSCULAR; INTRAVENOUS at 20:59

## 2022-01-05 RX ADMIN — HEPARIN SODIUM 5000 UNITS: 5000 INJECTION INTRAVENOUS; SUBCUTANEOUS at 05:40

## 2022-01-05 RX ADMIN — METOPROLOL SUCCINATE 25 MG: 25 TABLET, FILM COATED, EXTENDED RELEASE ORAL at 09:37

## 2022-01-05 NOTE — ASSESSMENT & PLAN NOTE
- presents with generalized weakness, cough, shortness of breath  - follows with Cardiology at [facility] in [place]  - last echo in 2020 showed an LV EF of 40-45%; repeat echocardiogram here shows similar EF of 35-40% with moderate global hypokinesis and grade 1 diastolic dysfunction  Moderate to severe mitral regurgitation also noted      - volume status improving with IV diuresis  - continue with an additional dose of IV Lasix this evening per Cardiology  - cont metoprolol; no ACE/ARB due to CKD  - monitor on telemetry  - low-salt diet  - daily weights  - monitor intake/output  - appreciate Cardiology recommendations

## 2022-01-05 NOTE — CASE MANAGEMENT
Case Management Progress Note    Patient name Edie Tong  Location [address] [id number] MRN [id number]  : 1927 Date 2022       LOS (days): 3  Geometric Mean LOS (GMLOS) (days): 3 80  Days to GMLOS:0 8        OBJECTIVE:        Current admission status: Inpatient  Preferred Pharmacy:   [address] [place] [address] R R 1 [phone] R R 1 ([address])  [address] [place] [address]  Phone: [phone] Fax: [phone]    [place] [address] [address] [facility] [address]  Phone: [phone] Fax: [phone] [address] [address]  Phone: [phone] Fax: [phone]    Primary Care Provider: Jason Wilkerson DO    Primary Insurance: MEDICARE  Secondary Insurance: COMMERCIAL MISCELLANEOUS    PROGRESS NOTE:    Per SLIM patient will continue to require additional inpatient hospital stay 24-48 hours due to IV diuresis

## 2022-01-05 NOTE — PLAN OF CARE
Problem: MOBILITY - ADULT  Goal: Maintain or return to baseline ADL function  Description: INTERVENTIONS:  -  Assess patient's ability to carry out ADLs; assess patient's baseline for ADL function and identify physical deficits which impact ability to perform ADLs (bathing, care of mouth/teeth, toileting, grooming, dressing, etc )  - Assess/evaluate cause of self-care deficits   - Assess range of motion  - Assess patient's mobility; develop plan if impaired  - Assess patient's need for assistive devices and provide as appropriate  - Encourage maximum independence but intervene and supervise when necessary  - Involve family in performance of ADLs  - Assess for home care needs following discharge   - Consider OT consult to assist with ADL evaluation and planning for discharge  - Provide patient education as appropriate  Outcome: Progressing  Goal: Maintains/Returns to pre admission functional level  Description: INTERVENTIONS:  - Perform BMAT or MOVE assessment daily    - Set and communicate daily mobility goal to care team and patient/family/caregiver  - Collaborate with rehabilitation services on mobility goals if consulted  - Perform Range of Motion 2 times a day  - Reposition patient every 2 hours    - Dangle patient 2 times a day  - Stand patient 2 times a day  - Ambulate patient 2 times a day  - Out of bed to chair 2 times a day   - Out of bed for meals 2 times a day  - Out of bed for toileting  - Record patient progress and toleration of activity level   Outcome: Progressing     Problem: PAIN - ADULT  Goal: Verbalizes/displays adequate comfort level or baseline comfort level  Description: Interventions:  - Encourage patient to monitor pain and request assistance  - Assess pain using appropriate pain scale  - Administer analgesics based on type and severity of pain and evaluate response  - Implement non-pharmacological measures as appropriate and evaluate response  - Consider cultural and social influences on pain and pain management  - Notify physician/advanced practitioner if interventions unsuccessful or patient reports new pain  Outcome: Progressing     Problem: INFECTION - ADULT  Goal: Absence or prevention of progression during hospitalization  Description: INTERVENTIONS:  - Assess and monitor for signs and symptoms of infection  - Monitor lab/diagnostic results  - Monitor all insertion sites, i e  indwelling lines, tubes, and drains  - Monitor endotracheal if appropriate and nasal secretions for changes in amount and color  - [place] appropriate cooling/warming therapies per order  - Administer medications as ordered  - Instruct and encourage patient and family to use good hand hygiene technique  - Identify and instruct in appropriate isolation precautions for identified infection/condition  Outcome: Progressing  Goal: Absence of fever/infection during neutropenic period  Description: INTERVENTIONS:  - Monitor WBC    Outcome: Progressing     Problem: SAFETY ADULT  Goal: Maintain or return to baseline ADL function  Description: INTERVENTIONS:  -  Assess patient's ability to carry out ADLs; assess patient's baseline for ADL function and identify physical deficits which impact ability to perform ADLs (bathing, care of mouth/teeth, toileting, grooming, dressing, etc )  - Assess/evaluate cause of self-care deficits   - Assess range of motion  - Assess patient's mobility; develop plan if impaired  - Assess patient's need for assistive devices and provide as appropriate  - Encourage maximum independence but intervene and supervise when necessary  - Involve family in performance of ADLs  - Assess for home care needs following discharge   - Consider OT consult to assist with ADL evaluation and planning for discharge  - Provide patient education as appropriate  Outcome: Progressing  Goal: Patient will remain free of falls  Description: INTERVENTIONS:  - Educate patient/family on patient safety including physical limitations  - Instruct patient to call for assistance with activity   - Consult OT/PT to assist with strengthening/mobility   - Keep Call bell within reach  - Keep bed low and locked with side rails adjusted as appropriate  - Keep care items and personal belongings within reach  - Initiate and maintain comfort rounds  - Make Fall Risk Sign visible to staff  - Offer Toileting every 2 Hours, in advance of need  - Initiate/Maintain bedalarm  - Obtain necessary fall risk management equipment:   - Apply yellow socks and bracelet for high fall risk patients  - Consider moving patient to room near nurses station  Outcome: Progressing     Problem: DISCHARGE PLANNING  Goal: Discharge to home or other facility with appropriate resources  Description: INTERVENTIONS:  - Identify barriers to discharge w/patient and caregiver  - Arrange for needed discharge resources and transportation as appropriate  - Identify discharge learning needs (meds, wound care, etc )  - Arrange for interpretive services to assist at discharge as needed  - Refer to Case Management Department for coordinating discharge planning if the patient needs post-hospital services based on physician/advanced practitioner order or complex needs related to functional status, cognitive ability, or social support system  Outcome: Progressing     Problem: Prexisting or High Potential for Compromised Skin Integrity  Goal: Skin integrity is maintained or improved  Description: INTERVENTIONS:  - Identify patients at risk for skin breakdown  - Assess and monitor skin integrity  - Assess and monitor nutrition and hydration status  - Monitor labs   - Assess for incontinence   - Turn and reposition patient  - Assist with mobility/ambulation  - Relieve pressure over bony prominences  - Avoid friction and shearing  - Provide appropriate hygiene as needed including keeping skin clean and dry  - Evaluate need for skin moisturizer/barrier cream  - Collaborate with interdisciplinary team   - Patient/family teaching  - Consider wound care consult   Outcome: Progressing       Problem: Nutrition/Hydration-ADULT  Goal: Nutrient/Hydration intake appropriate for improving, restoring or maintaining nutritional needs  Description: Monitor and assess patient's nutrition/hydration status for malnutrition  Collaborate with interdisciplinary team and initiate plan and interventions as ordered  Monitor patient's weight and dietary intake as ordered or per policy  Utilize nutrition screening tool and intervene as necessary  Determine patient's food preferences and provide high-protein, high-caloric foods as appropriate       INTERVENTIONS:  - Monitor oral intake, urinary output, labs, and treatment plans  - Assess nutrition and hydration status and recommend course of action  - Evaluate amount of meals eaten  - Assist patient with eating if necessary   - Allow adequate time for meals  - Recommend/ encourage appropriate diets, oral nutritional supplements, and vitamin/mineral supplements  - Order, calculate, and assess calorie counts as needed  - Recommend, monitor, and adjust tube feedings and TPN/PPN based on assessed needs  - Assess need for intravenous fluids  - Provide specific nutrition/hydration education as appropriate  - Include patient/family/caregiver in decisions related to nutrition  Outcome: Progressing

## 2022-01-05 NOTE — PROGRESS NOTES
[facility] [facility]  Progress Note Darlene Oliver 11/25/1927, 80 y o  male MRN: [id number]  Unit/Bed#: -Ludy Encounter: [id number]  Primary Care Provider: Pawan Mckoy DO   Date and time admitted to hospital: 1/2/2022 12:51 PM    * Acute systolic heart failure ([facility] [address] )  Assessment & Plan  - presents with generalized weakness, cough, shortness of breath  - follows with Cardiology at [facility]  - last echo in 2020 showed an LV EF of 40-45%; repeat echocardiogram here shows similar EF of 35-40% with moderate global hypokinesis and grade 1 diastolic dysfunction  Moderate to severe mitral regurgitation also noted  - volume status improving with IV diuresis  - continue with an additional dose of IV Lasix this evening per Cardiology  - cont metoprolol; no ACE/ARB due to CKD  - monitor on telemetry  - low-salt diet  - daily weights  - monitor intake/output  - appreciate Cardiology recommendations    Acute on chronic respiratory failure with hypoxia ([facility] [address] )  Assessment & Plan  - Secondary to decompensated heart failure  - Continue IV lasix, monitor respiratory status    Stage 3b chronic kidney disease ([facility] [address] )  Assessment & Plan  - renal function at baseline  - monitor closely with daily BMP while on IV diuretics    Seizure ([facility] [address] )  Assessment & Plan  - Continue home dose keppra     Benign prostatic hyperplasia  Assessment & Plan  - Continue home dose flomax    CAD (coronary artery disease)  Assessment & Plan  - history of CABG in 2006 and abnormal stress test in 2020 medically managed    - no acute concerns from the standpoint at this point in time  - continue aspirin, statin, metoprolol       Hyperlipidemia  Assessment & Plan  - continue pravastatin        VTE Pharmacologic Prophylaxis: VTE Score: 7 High Risk (Score >/= 5) - Pharmacological DVT Prophylaxis Ordered: heparin  Sequential Compression Devices Ordered      Patient Centered Rounds: I performed bedside rounds with nursing staff today  Discussions with Specialists or Other Care Team Provider: Case Management  Cardiology    Education and Discussions with Family / Patient: Updated  (son in law) at bedside  Time Spent for Care: 20 minutes  More than 50% of total time spent on counseling and coordination of care as described above  Current Length of Stay: 3 day(s)  Current Patient Status: Inpatient   Certification Statement: The patient will continue to require additional inpatient hospital stay due to IV diuresis  Discharge Plan: Anticipate discharge in 24-48 hrs to home  Code Status: Level 1 - Full Code    Subjective:   Patient seen examined  Following up for acute on chronic CHF and hypoxia  Overall doing much better today  Confusion has completely resolved  Oxygen demands are improving  Patient feeling well  Ambulating to the bathroom with assistance  Objective:     Vitals:   Temp (24hrs), Av 3 °F (36 3 °C), Min:97 1 °F (36 2 °C), Max:97 6 °F (36 4 °C)    Temp:  [97 1 °F (36 2 °C)-97 6 °F (36 4 °C)] 97 3 °F (36 3 °C)  HR:  [67-93] 84  Resp:  [16-18] 18  BP: (108-120)/(54-73) 119/66  SpO2:  [96 %-99 %] 96 %  Body mass index is 30 5 kg/m²  Input and Output Summary (last 24 hours): Intake/Output Summary (Last 24 hours) at 2022 1519  Last data filed at 2022 0543  Gross per 24 hour   Intake 200 ml   Output 1860 ml   Net -1660 ml       Physical Exam:   PHYSICAL EXAM:    Vitals signs reviewed  Constitutional   Awake and cooperative  NAD  Head/Neck   Normocephalic  Atraumatic  HEENT   No scleral icterus  EOMI  Heart   Regular rate and rhythm  No murmers  Lungs   Faint crackles at the bases bilaterally  Respirations unlabored  Good air movement  Abdomen   Soft  Nontender  Nondistended  Skin   Skin color normal  No rashes  Extremities   No deformities  No peripheral edema  Neuro   Alert and oriented  No new deficits  Psych   Mood stable   Affect normal  Additional Data:     Labs:  Results from last 7 days   Lab Units 01/04/22  0559   WBC Thousand/uL 9 84   HEMOGLOBIN g/dL 10 8*   HEMATOCRIT % 32 7*   PLATELETS Thousands/uL 398*   NEUTROS PCT % 75   LYMPHS PCT % 10*   MONOS PCT % 9   EOS PCT % 4     Results from last 7 days   Lab Units 01/05/22  0434 01/03/22  0506 01/02/22  1329   SODIUM mmol/L 131*   < > 130*   POTASSIUM mmol/L 3 6   < > 4 6   CHLORIDE mmol/L 93*   < > 95*   CO2 mmol/L 26   < > 16*   BUN mg/dL 31*   < > 29*   CREATININE mg/dL 1 75*   < > 1 71*   ANION GAP mmol/L 12   < > 19*   CALCIUM mg/dL 9 2   < > 9 6   ALBUMIN g/dL  --   --  3 4*   TOTAL BILIRUBIN mg/dL  --   --  0 49   ALK PHOS U/L  --   --  75   ALT U/L  --   --  32   AST U/L  --   --  22   GLUCOSE RANDOM mg/dL 95   < > 167*    < > = values in this interval not displayed  Results from last 7 days   Lab Units 01/02/22  1426   INR  1 10                   Lines/Drains:  Invasive Devices  Report    Peripheral Intravenous Line            Peripheral IV 01/02/22 Left Arm 3 days    Peripheral IV 01/02/22 Right;Upper 3 days          Drain            External Urinary Catheter Small 1 day                      Imaging: No pertinent imaging reviewed      Recent Cultures (last 7 days):         Last 24 Hours Medication List:   Current Facility-Administered Medications   Medication Dose Route Frequency Provider Last Rate    acetaminophen  650 mg Oral Q6H PRN Everlean Angelucci, MD      aspirin  81 mg Oral Daily JESSICA Solares      calcium carbonate  1,000 mg Oral Daily PRN Everlean Angelucci, MD      docusate sodium  100 mg Oral BID Everlean Angelucci, MD      furosemide  20 mg Intravenous Once JESSICA Solares      guaiFENesin  600 mg Oral Q12H [address] Ceferino Moran,       heparin (porcine)  5,000 Units Subcutaneous [facility] Everlean Angelucci, MD      levETIRAcetam  250 mg Oral BID Everlean Angelucci, MD      metoprolol succinate  25 mg Oral Daily Everlean Angelucci, MD      nicotine  1 patch Transdermal Daily Galo Astudillo MD      ondansetron  4 mg Intravenous Q6H PRN Galo Astudillo MD      pravastatin  80 mg Oral Daily With JESSICA Trujillo      senna  1 tablet Oral Daily Galo Astudillo MD      thiamine  100 mg Oral Daily Galo Astudillo MD          Today, Patient Was Seen By: Lazarus Opitz Starsinic, DO    **Please Note: This note may have been constructed using a voice recognition system  **

## 2022-01-05 NOTE — PROGRESS NOTES
Cardiology Progress Note - Carlos Stern 80 y o  male MRN: [id number]    Unit/Bed#: -01 Encounter: [id number]      Assessment/Plan:  1  Acute on chronic HFrEF  - echo this admission shows EF 35-40%, moderate global hypokinesis with regional variation-mild anteroseptum, apical wall appears severely hypokinetic  There is mild concentric hypertrophy, diastolic function is mildly abnormal- Grade 1 diastolic dysfunction  - Patient appears fluid overloaded on exam  - NT-proBNP 9,301 on admission  - Net -2,040 ml today  - Maintain strict I/O's, daily weights, fluid restriction, and low sodium diet  - Lasix held this morning due to soft blood pressure  Order 1 time dose 20 mg IV Lasix this evening  Reassess fluid status and labs in the morning    - Continue metoprolol succinate 25mg daily   - No ACEi/ARB due to CKD      2  CAD s/p CABG in 2006  - No report available from CABG or previous cardiac caths  - Continue aspirin 81mg and metoprolol succinate 25mg  - Patient on simvastatin 40mg daily at home, pravastatin 80mg ordered during hospitalization     3  Non MI Myocardial Injury   - HS troponin max 411 in the setting of acute on chronic HFrEF  - ECG showed sinus rhythm with frequent PVCs and LBBB  - Patient denies chest pain, shortness of breath or anginal equivalent    4  CKD Stage 3  - creatinine 1 75, decreased Lasix to one time 20mg IV lasix this evening, reassess in the morning  - Management per primary team      5  Acute mental status change  -  mental status improving      6  Hyperlipidemia  - Takes simvastatin 40mg daily at home, pravastatin 80mg ordered during hospitalization    Subjective:   Patient seen and examined  No significant events overnight  Patient denies any chest pain, shortness of breath, palpitations, or lower extremity edema  Patient appears comfortable in a recliner    Patient appears to have improved mental status today and is aware that he is in the hospital       Objective: Vitals: Blood pressure 119/66, pulse 84, temperature (!) 97 3 °F (36 3 °C), resp  rate 18, height 5' 3" (1 6 m), weight 78 1 kg (172 lb 2 9 oz), SpO2 96 %  , Body mass index is 30 5 kg/m² ,   Orthostatic Blood Pressures      Most Recent Value   Blood Pressure 119/66 filed at 01/05/2022 1516   Patient Position - Orthostatic VS Lying filed at 01/03/2022 1445            Intake/Output Summary (Last 24 hours) at 1/5/2022 1551  Last data filed at 1/5/2022 1516  Gross per 24 hour   Intake 440 ml   Output 2010 ml   Net -1570 ml         Physical Exam:  Physical Exam  Vitals and nursing note reviewed  Constitutional:       Appearance: He is well-developed  HENT:      Head: Normocephalic and atraumatic  Eyes:      Conjunctiva/sclera: Conjunctivae normal    Cardiovascular:      Rate and Rhythm: Normal rate and regular rhythm  Heart sounds: Normal heart sounds  No murmur heard  Pulmonary:      Effort: Pulmonary effort is normal  No respiratory distress  Breath sounds: Rales (bilateral basilar rales) present  Abdominal:      Palpations: Abdomen is soft  Tenderness: There is no abdominal tenderness  Musculoskeletal:      Cervical back: Neck supple  Right lower leg: No edema  Left lower leg: No edema  Skin:     General: Skin is warm and dry  Neurological:      Mental Status: He is alert                Medications:      Current Facility-Administered Medications:     acetaminophen (TYLENOL) tablet 650 mg, 650 mg, Oral, Q6H PRN, Alize Goins MD    aspirin (ECOTRIN LOW STRENGTH) EC tablet 81 mg, 81 mg, Oral, Daily, JESSICA Thompson, 81 mg at 01/05/22 7341    calcium carbonate (TUMS) chewable tablet 1,000 mg, 1,000 mg, Oral, Daily PRN, Alize Goins MD    docusate sodium (COLACE) capsule 100 mg, 100 mg, Oral, BID, Alize Goins MD, 100 mg at 01/05/22 0551    furosemide (LASIX) injection 20 mg, 20 mg, Intravenous, Once, JESSICA Thompson    guaiFENesin [facility]) 12 hr tablet 600 mg, 600 mg, Oral, Q12H [address], Chava Vanninic, DO, 600 mg at 01/05/22 1301    heparin (porcine) subcutaneous injection 5,000 Units, 5,000 Units, Subcutaneous, Q8H [address], Johanne Mijares MD, 5,000 Units at 01/05/22 1432    levETIRAcetam (KEPPRA) tablet 250 mg, 250 mg, Oral, BID, Johanne Mijares MD, 250 mg at 01/05/22 0936    metoprolol succinate (TOPROL-XL) 24 hr tablet 25 mg, 25 mg, Oral, Daily, Johanne Mijares MD, 25 mg at 01/05/22 3163    nicotine (NICODERM CQ) 14 mg/24hr TD 24 hr patch 1 patch, 1 patch, Transdermal, Daily, Johanne Mijares MD, 1 patch at 01/04/22 0900    ondansetron (ZOFRAN) injection 4 mg, 4 mg, Intravenous, Q6H PRN, Johanne Mijares MD    pravastatin (PRAVACHOL) tablet 80 mg, 80 mg, Oral, Daily With JESSICA Garner, 80 mg at 01/04/22 1717    senna (SENOKOT) tablet 8 6 mg, 1 tablet, Oral, Daily, Johanne Mijares MD, 8 6 mg at 01/05/22 4176    thiamine tablet 100 mg, 100 mg, Oral, Daily, Johanne Mijares MD, 100 mg at 01/05/22 0936     Labs & Results:     Results from last 7 days   Lab Units 01/02/22  1825 01/02/22  1546 01/02/22  1329   HS TNI 0HR ng/L  --   --  87*   HS TNI 2HR ng/L  --  184*  --    HSTNI D2 ng/L  --  97  --    HS TNI 4HR ng/L 411*  --   --    HSTNI D4 ng/L 324  --   --    NT-PRO BNP pg/mL  --   --  9,301*     Results from last 7 days   Lab Units 01/04/22  0559 01/03/22  0506 01/02/22  1329   WBC Thousand/uL 9 84 9 05 16 92*   HEMOGLOBIN g/dL 10 8* 9 9* 11 1*   HEMATOCRIT % 32 7* 29 5* 33 5*   PLATELETS Thousands/uL 398* 329 395*         Results from last 7 days   Lab Units 01/05/22  0434 01/04/22  0559 01/03/22  0506 01/02/22  1329 01/02/22  1329   POTASSIUM mmol/L 3 6 3 5 3 9   < > 4 6   CHLORIDE mmol/L 93* 95* 98*   < > 95*   CO2 mmol/L 26 20* 21   < > 16*   BUN mg/dL 31* 31* 26*   < > 29*   CREATININE mg/dL 1 75* 1 69* 1 66*   < > 1 71*   CALCIUM mg/dL 9 2 9 6 9 3   < > 9 6   ALK PHOS U/L  --   --   --   --  75   ALT U/L  --   --   --   --  32   AST U/L  --   --   --   -- 22    < > = values in this interval not displayed  Results from last 7 days   Lab Units 01/02/22  1426   INR  1 10   PTT seconds 33     Results from last 7 days   Lab Units 01/04/22  0559 01/03/22  0506 01/02/22  1329   MAGNESIUM mg/dL 1 8 1 8 1 9       Vitals: Blood pressure 119/66, pulse 84, temperature (!) 97 3 °F (36 3 °C), resp  rate 18, height 5' 3" (1 6 m), weight 78 1 kg (172 lb 2 9 oz), SpO2 96 %  , Body mass index is 30 5 kg/m² ,   Orthostatic Blood Pressures      Most Recent Value   Blood Pressure 119/66 filed at 01/05/2022 1516   Patient Position - Orthostatic VS Lying filed at 01/03/2022 4400          Systolic (32BQA), UCV:921 , Min:108 , TXZ:818     Diastolic (83UBH), GGB:31, Min:54, Max:73        Intake/Output Summary (Last 24 hours) at 1/5/2022 1551  Last data filed at 1/5/2022 1516  Gross per 24 hour   Intake 440 ml   Output 2010 ml   Net -1570 ml       Invasive Devices  Report    Peripheral Intravenous Line            Peripheral IV 01/02/22 Left Arm 3 days    Peripheral IV 01/02/22 Right;Upper 3 days          Drain            External Urinary Catheter Small 1 day                  EKG: Sinus rhythm with frequent Premature ventricular complexes  Left bundle branch block  Baseline artifact  Confirmed by Cari Boston ([id number]) on 1/2/2022 6:57:17 PM     Echo 01/03/2022    Left Ventricle: Left ventricular cavity size is normal  Wall thickness is mildly increased  Systolic function is severely reduced   Estimated LVEF 35-40%(recommend MUGA scan for accurate LVEF) There is moderate global hypokinesis with regional variation - mid anteroseptum, apical wall appears severely hypokinetic There is mild concentric hypertrophy  Diastolic function is mildly abnormal, consistent with grade I (abnormal) relaxation    IVS: There is abnormal septal motion consistent with left bundle branch block    Left Atrium: The atrium is mildly dilated    Aortic Valve: There is mild regurgitation      Mitral Valve: There is moderate thickening  There is moderate annular calcification  There is moderate to severe regurgitation    Tricuspid Valve: There is mild regurgitation  Telemetry:    Telemetry Reviewed: Normal Sinus Rhythm  Indication for Continued Telemetry Use: No indication for continued use  Will discontinue       BP Readings from Last 3 Encounters:   01/05/22 119/66   12/10/21 124/60   08/31/21 124/72      Wt Readings from Last 3 Encounters:   01/05/22 78 1 kg (172 lb 2 9 oz)   12/10/21 80 3 kg (177 lb)   08/31/21 75 4 kg (166 lb 3 2 oz)

## 2022-01-06 LAB
ANION GAP SERPL CALCULATED.3IONS-SCNC: 13 MMOL/L (ref 4–13)
BUN SERPL-MCNC: 36 MG/DL (ref 5–25)
CALCIUM SERPL-MCNC: 9 MG/DL (ref 8.3–10.1)
CHLORIDE SERPL-SCNC: 92 MMOL/L (ref 100–108)
CO2 SERPL-SCNC: 25 MMOL/L (ref 21–32)
CREAT SERPL-MCNC: 1.83 MG/DL (ref 0.6–1.3)
GFR SERPL CREATININE-BSD FRML MDRD: 30 ML/MIN/1.73SQ M
GLUCOSE SERPL-MCNC: 97 MG/DL (ref 65–140)
NT-PROBNP SERPL-MCNC: ABNORMAL PG/ML
POTASSIUM SERPL-SCNC: 3.6 MMOL/L (ref 3.5–5.3)
SODIUM SERPL-SCNC: 130 MMOL/L (ref 136–145)

## 2022-01-06 PROCEDURE — 99232 SBSQ HOSP IP/OBS MODERATE 35: CPT | Performed by: INTERNAL MEDICINE

## 2022-01-06 PROCEDURE — 83880 ASSAY OF NATRIURETIC PEPTIDE: CPT | Performed by: INTERNAL MEDICINE

## 2022-01-06 PROCEDURE — 97163 PT EVAL HIGH COMPLEX 45 MIN: CPT

## 2022-01-06 PROCEDURE — 80048 BASIC METABOLIC PNL TOTAL CA: CPT | Performed by: INTERNAL MEDICINE

## 2022-01-06 PROCEDURE — 99233 SBSQ HOSP IP/OBS HIGH 50: CPT | Performed by: INTERNAL MEDICINE

## 2022-01-06 RX ORDER — FUROSEMIDE 40 MG/1
40 TABLET ORAL DAILY
Status: DISCONTINUED | OUTPATIENT
Start: 2022-01-06 | End: 2022-01-07

## 2022-01-06 RX ADMIN — FUROSEMIDE 40 MG: 40 TABLET ORAL at 09:27

## 2022-01-06 RX ADMIN — LEVETIRACETAM 250 MG: 250 TABLET, FILM COATED ORAL at 09:27

## 2022-01-06 RX ADMIN — GUAIFENESIN 600 MG: 600 TABLET ORAL at 09:27

## 2022-01-06 RX ADMIN — HEPARIN SODIUM 5000 UNITS: 5000 INJECTION INTRAVENOUS; SUBCUTANEOUS at 14:20

## 2022-01-06 RX ADMIN — STANDARDIZED SENNA CONCENTRATE 8.6 MG: 8.6 TABLET ORAL at 09:27

## 2022-01-06 RX ADMIN — ASPIRIN 81 MG: 81 TABLET, COATED ORAL at 09:27

## 2022-01-06 RX ADMIN — BENZONATATE 100 MG: 100 CAPSULE ORAL at 17:42

## 2022-01-06 RX ADMIN — PRAVASTATIN SODIUM 80 MG: 80 TABLET ORAL at 17:42

## 2022-01-06 RX ADMIN — BENZONATATE 100 MG: 100 CAPSULE ORAL at 09:51

## 2022-01-06 RX ADMIN — THIAMINE HCL TAB 100 MG 100 MG: 100 TAB at 09:27

## 2022-01-06 RX ADMIN — HEPARIN SODIUM 5000 UNITS: 5000 INJECTION INTRAVENOUS; SUBCUTANEOUS at 05:19

## 2022-01-06 RX ADMIN — LEVETIRACETAM 250 MG: 250 TABLET, FILM COATED ORAL at 17:42

## 2022-01-06 RX ADMIN — METOPROLOL SUCCINATE 25 MG: 25 TABLET, FILM COATED, EXTENDED RELEASE ORAL at 09:27

## 2022-01-06 RX ADMIN — DOCUSATE SODIUM 100 MG: 100 CAPSULE, LIQUID FILLED ORAL at 17:42

## 2022-01-06 RX ADMIN — DOCUSATE SODIUM 100 MG: 100 CAPSULE, LIQUID FILLED ORAL at 09:27

## 2022-01-06 NOTE — PROGRESS NOTES
Cardiology Progress Note - Bulmaro Monsalve 80 y o  male MRN: [id number]    Unit/Bed#: -01 Encounter: [id number]      Assessment/Plan:  1  Acute on chronic HFrEF  - echo this admission shows EF 35-40%, moderate global hypokinesis with regional variation-mild anteroseptum, apical wall appears severely hypokinetic  There is mild concentric hypertrophy, diastolic function is mildly abnormal- Grade 1 diastolic dysfunction  - Patient appears euvolemic  - Patient states that he walked with staff and breathing was improved however noted by PT/OT that his O2 Sat decreased while ambulating  - NT-proBNP 9,301 on admission  - Net -2,190 ml today  - Maintain strict I/O's, daily weights, fluid restriction, and low sodium diet  - Transition to 40mg PO lasix daily  - Continue metoprolol succinate 25mg daily   - No ACEi/ARB due to CKD      2  CAD s/p CABG in 2006  - No report available from CABG or previous cardiac caths  - Continue aspirin 81mg and metoprolol succinate 25mg  - Patient on simvastatin 40mg daily at home, pravastatin 80mg ordered during hospitalization     3  Non MI Myocardial Injury   - HS troponin max 411 in the setting of acute on chronic HFrEF  - ECG showed sinus rhythm with frequent PVCs and LBBB  - Patient denies chest pain, shortness of breath or anginal equivalent    4  CKD Stage 3  - creatinine 1 83, transitioned to oral lasix  - Management per primary team      5  Acute mental status change  -  mental status improving      6  Hyperlipidemia  - Takes simvastatin 40mg daily at home, pravastatin 80mg ordered during hospitalization    Subjective:   Patient seen and examined  No significant events overnight  Patient denies any chest pain, shortness of breath, palpitations, or lower extremity edema  Patient appears comfortable in a recliner  Patient states that he was able to walk around the room with staff without significant shortness of breath      Objective:     Vitals: Blood pressure 118/66, pulse 93, temperature 99 7 °F (37 6 °C), resp  rate 19, height 5' 3" (1 6 m), weight 78 8 kg (173 lb 11 6 oz), SpO2 95 %  , Body mass index is 30 77 kg/m² ,   Orthostatic Blood Pressures      Most Recent Value   Blood Pressure 118/66 filed at 01/06/2022 0809   Patient Position - Orthostatic VS Lying filed at 01/03/2022 1445            Intake/Output Summary (Last 24 hours) at 1/6/2022 1105  Last data filed at 1/6/2022 0901  Gross per 24 hour   Intake 940 ml   Output 1000 ml   Net -60 ml         Physical Exam:  Physical Exam  Vitals and nursing note reviewed  Constitutional:       Appearance: He is well-developed  HENT:      Head: Normocephalic and atraumatic  Eyes:      Conjunctiva/sclera: Conjunctivae normal    Cardiovascular:      Rate and Rhythm: Normal rate and regular rhythm  Heart sounds: Normal heart sounds  No murmur heard  Pulmonary:      Effort: Pulmonary effort is normal  No respiratory distress  Breath sounds: Normal breath sounds  Abdominal:      Palpations: Abdomen is soft  Tenderness: There is no abdominal tenderness  Musculoskeletal:      Cervical back: Neck supple  Right lower leg: No edema  Left lower leg: No edema  Skin:     General: Skin is warm and dry  Neurological:      Mental Status: He is alert                Medications:      Current Facility-Administered Medications:     acetaminophen (TYLENOL) tablet 650 mg, 650 mg, Oral, Q6H PRN, Kathleen Herrera MD    aspirin (ECOTRIN LOW STRENGTH) EC tablet 81 mg, 81 mg, Oral, Daily, JESSICA Ji, 81 mg at 01/06/22 1039    benzonatate (TESSALON PERLES) capsule 100 mg, 100 mg, Oral, TID PRN, Joey Hewitt PA-C, 100 mg at 01/06/22 0401    calcium carbonate (TUMS) chewable tablet 1,000 mg, 1,000 mg, Oral, Daily PRN, Kathleen Herrera MD    docusate sodium (COLACE) capsule 100 mg, 100 mg, Oral, BID, Kathleen Herrera MD, 100 mg at 01/06/22 3931    furosemide (LASIX) tablet 40 mg, 40 mg, Oral, Daily, Sonali Fernando CRNP, 40 mg at 01/06/22 0927    guaiFENesin (MUCINEX) 12 hr tablet 600 mg, 600 mg, Oral, Q12H [address], Samara Shasta Vanninic, DO, 600 mg at 01/06/22 3416    heparin (porcine) subcutaneous injection 5,000 Units, 5,000 Units, Subcutaneous, Q8H [address], Oliva Jones MD, 5,000 Units at 01/06/22 0519    levETIRAcetam (KEPPRA) tablet 250 mg, 250 mg, Oral, BID, Oliva Jones MD, 250 mg at 01/06/22 3909    metoprolol succinate (TOPROL-XL) 24 hr tablet 25 mg, 25 mg, Oral, Daily, Oliva Jones MD, 25 mg at 01/06/22 0927    nicotine (NICODERM CQ) 14 mg/24hr TD 24 hr patch 1 patch, 1 patch, Transdermal, Daily, Oliva Jones MD, 1 patch at 01/04/22 0900    ondansetron (ZOFRAN) injection 4 mg, 4 mg, Intravenous, Q6H PRN, Oliva Jones MD    pravastatin (PRAVACHOL) tablet 80 mg, 80 mg, Oral, Daily With Dinner, JESSICA Plummer, 80 mg at 01/05/22 1618    senna (SENOKOT) tablet 8 6 mg, 1 tablet, Oral, Daily, Oliva Jones MD, 8 6 mg at 01/06/22 0876    thiamine tablet 100 mg, 100 mg, Oral, Daily, Oliva Jones MD, 100 mg at 01/06/22 0795     Labs & Results:     Results from last 7 days   Lab Units 01/06/22  0430 01/02/22  1825 01/02/22  1546 01/02/22  1329 01/02/22  1329   HS TNI 0HR ng/L  --   --   --   --  87*   HS TNI 2HR ng/L  --   --  184*  --   --    HSTNI D2 ng/L  --   --  97  --   --    HS TNI 4HR ng/L  --  411*  --   --   --    HSTNI D4 ng/L  --  324  --   --   --    NT-PRO BNP pg/mL 12,040*  --   --    < > 9,301*    < > = values in this interval not displayed       Results from last 7 days   Lab Units 01/04/22  0559 01/03/22  0506 01/02/22  1329   WBC Thousand/uL 9 84 9 05 16 92*   HEMOGLOBIN g/dL 10 8* 9 9* 11 1*   HEMATOCRIT % 32 7* 29 5* 33 5*   PLATELETS Thousands/uL 398* 329 395*         Results from last 7 days   Lab Units 01/06/22  0430 01/05/22  0434 01/04/22  0559 01/03/22  0506 01/02/22  1329   POTASSIUM mmol/L 3 6 3 6 3 5   < > 4 6   CHLORIDE mmol/L 92* 93* 95*   < > 95*   CO2 mmol/L 25 26 20*   < > 16*   BUN mg/dL 36* 31* 31*   < > 29*   CREATININE mg/dL 1 83* 1 75* 1 69*   < > 1 71*   CALCIUM mg/dL 9 0 9 2 9 6   < > 9 6   ALK PHOS U/L  --   --   --   --  75   ALT U/L  --   --   --   --  32   AST U/L  --   --   --   --  22    < > = values in this interval not displayed  Results from last 7 days   Lab Units 01/02/22  1426   INR  1 10   PTT seconds 33     Results from last 7 days   Lab Units 01/04/22  0559 01/03/22  0506 01/02/22  1329   MAGNESIUM mg/dL 1 8 1 8 1 9       Vitals: Blood pressure 118/66, pulse 93, temperature 99 7 °F (37 6 °C), resp  rate 19, height 5' 3" (1 6 m), weight 78 8 kg (173 lb 11 6 oz), SpO2 95 %  , Body mass index is 30 77 kg/m² ,   Orthostatic Blood Pressures      Most Recent Value   Blood Pressure 118/66 filed at 01/06/2022 0809   Patient Position - Orthostatic VS Lying filed at 01/03/2022 2329          Systolic (82ODC), MARY KATE:347 , Min:118 , HQY:892     Diastolic (14RRZ), CSY:05, Min:66, Max:70        Intake/Output Summary (Last 24 hours) at 1/6/2022 1105  Last data filed at 1/6/2022 0901  Gross per 24 hour   Intake 940 ml   Output 1000 ml   Net -60 ml       Invasive Devices  Report    Peripheral Intravenous Line            Peripheral IV 01/02/22 Left Arm 3 days    Peripheral IV 01/02/22 Right;Upper 3 days          Drain            External Urinary Catheter Small 2 days                  EKG: Sinus rhythm with frequent Premature ventricular complexes  Left bundle branch block  Baseline artifact  Confirmed by Cari Boston ([id number]) on 1/2/2022 6:57:17 PM     Echo 01/03/2022    Left Ventricle: Left ventricular cavity size is normal  Wall thickness is mildly increased  Systolic function is severely reduced   Estimated LVEF 35-40%(recommend MUGA scan for accurate LVEF) There is moderate global hypokinesis with regional variation - mid anteroseptum, apical wall appears severely hypokinetic There is mild concentric hypertrophy   Diastolic function is mildly abnormal, consistent with grade I (abnormal) relaxation    IVS: There is abnormal septal motion consistent with left bundle branch block    Left Atrium: The atrium is mildly dilated    Aortic Valve: There is mild regurgitation    Mitral Valve: There is moderate thickening  There is moderate annular calcification  There is moderate to severe regurgitation    Tricuspid Valve: There is mild regurgitation      Telemetry: Not indicated    BP Readings from Last 3 Encounters:   01/06/22 118/66   12/10/21 124/60   08/31/21 124/72      Wt Readings from Last 3 Encounters:   01/06/22 78 8 kg (173 lb 11 6 oz)   12/10/21 80 3 kg (177 lb)   08/31/21 75 4 kg (166 lb 3 2 oz)

## 2022-01-06 NOTE — ASSESSMENT & PLAN NOTE
- presents with generalized weakness, cough, shortness of breath  - follows with Cardiology at [facility] in [place]  - last echo in 2020 showed an LV EF of 40-45%; repeat echocardiogram here shows similar EF of 35-40% with moderate global hypokinesis and grade 1 diastolic dysfunction  Moderate to severe mitral regurgitation also noted  - transition to p o   Lasix today 40 mg daily  - cont metoprolol; no ACE/ARB due to CKD  - monitor on telemetry  - low-salt diet  - daily weights  - monitor intake/output  - appreciate Cardiology recommendations

## 2022-01-06 NOTE — PROGRESS NOTES
[facility]  Progress Note Tanya Boyle 11/25/1927, 80 y o  male MRN: [id number]  Unit/Bed#: -Ludy Encounter: [id number]  Primary Care Provider: Julia Machado DO   Date and time admitted to hospital: 1/2/2022 12:51 PM    * Acute systolic heart failure ([facility] [address] )  Assessment & Plan  - presents with generalized weakness, cough, shortness of breath  - follows with Cardiology at [facility]  - last echo in 2020 showed an LV EF of 40-45%; repeat echocardiogram here shows similar EF of 35-40% with moderate global hypokinesis and grade 1 diastolic dysfunction  Moderate to severe mitral regurgitation also noted  - transition to p o  Lasix today 40 mg daily  - cont metoprolol; no ACE/ARB due to CKD  - monitor on telemetry  - low-salt diet  - daily weights  - monitor intake/output  - appreciate Cardiology recommendations    Acute on chronic respiratory failure with hypoxia ([facility] [address] )  Assessment & Plan  - Secondary to decompensated heart failure  - now resolved with IV diuresis    Stage 3b chronic kidney disease ([facility] [address] )  Assessment & Plan  - renal function at baseline  - monitor closely with daily BMP while on IV diuretics    Seizure ([facility] [address] )  Assessment & Plan  - Continue home dose keppra     Benign prostatic hyperplasia  Assessment & Plan  - Continue home dose flomax    CAD (coronary artery disease)  Assessment & Plan  - history of CABG in 2006 and abnormal stress test in 2020 medically managed    - no acute concerns from the standpoint at this point in time  - continue aspirin, statin, metoprolol       Hyperlipidemia  Assessment & Plan  - continue pravastatin      VTE Pharmacologic Prophylaxis: VTE Score: 7 High Risk (Score >/= 5) - Pharmacological DVT Prophylaxis Ordered: heparin  Sequential Compression Devices Ordered  Patient Centered Rounds: I performed bedside rounds with nursing staff today    Discussions with Specialists or Other Care Team Provider: Case Management  Cardiology    Education and Discussions with Family / Patient: Updated  (daughter) at bedside  Time Spent for Care: 20 minutes  More than 50% of total time spent on counseling and coordination of care as described above  Current Length of Stay: 4 day(s)  Current Patient Status: Inpatient   Certification Statement: The patient will continue to require additional inpatient hospital stay due to diuresis and renal monitoring  Discharge Plan: Anticipate discharge tomorrow to home  Code Status: Level 1 - Full Code    Subjective:   Patient seen examined  Following up for acute CHF  Volume status continues to improve  He is now off oxygen  He is ambulating independently  Overall doing well  Will monitor for additional 24 hours and plan for discharge tomorrow  Objective:     Vitals:   Temp (24hrs), Av 2 °F (37 3 °C), Min:98 9 °F (37 2 °C), Max:99 7 °F (37 6 °C)    Temp:  [98 9 °F (37 2 °C)-99 7 °F (37 6 °C)] 99 1 °F (37 3 °C)  HR:  [] 77  Resp:  [18-19] 19  BP: (118-119)/(66-70) 118/70  SpO2:  [91 %-95 %] 94 %  Body mass index is 30 77 kg/m²  Input and Output Summary (last 24 hours): Intake/Output Summary (Last 24 hours) at 2022 1517  Last data filed at 2022 0901  Gross per 24 hour   Intake 700 ml   Output 850 ml   Net -150 ml       PHYSICAL EXAM:    Vitals signs reviewed  Constitutional   Awake and cooperative  NAD  Head/Neck   Normocephalic  Atraumatic  HEENT   No scleral icterus  EOMI  Heart   Regular rate and rhythm  No murmers  Lungs   Clear to auscultation bilaterally  Respirations unlaboured  Abdomen   Soft  Nontender  Nondistended  Skin   Skin color normal  No rashes  Extremities   No deformities  No peripheral edema  Neuro   Alert and oriented  No new deficits  Psych   Mood stable   Affect normal          Additional Data:     Labs:  Results from last 7 days   Lab Units 22  0559   WBC Thousand/uL 9 84   HEMOGLOBIN g/dL 10 8*   HEMATOCRIT % 32 7*   PLATELETS Thousands/uL 398*   NEUTROS PCT % 75   LYMPHS PCT % 10*   MONOS PCT % 9   EOS PCT % 4     Results from last 7 days   Lab Units 01/06/22  0430 01/03/22  0506 01/02/22  1329   SODIUM mmol/L 130*   < > 130*   POTASSIUM mmol/L 3 6   < > 4 6   CHLORIDE mmol/L 92*   < > 95*   CO2 mmol/L 25   < > 16*   BUN mg/dL 36*   < > 29*   CREATININE mg/dL 1 83*   < > 1 71*   ANION GAP mmol/L 13   < > 19*   CALCIUM mg/dL 9 0   < > 9 6   ALBUMIN g/dL  --   --  3 4*   TOTAL BILIRUBIN mg/dL  --   --  0 49   ALK PHOS U/L  --   --  75   ALT U/L  --   --  32   AST U/L  --   --  22   GLUCOSE RANDOM mg/dL 97   < > 167*    < > = values in this interval not displayed  Results from last 7 days   Lab Units 01/02/22  1426   INR  1 10                   Lines/Drains:  Invasive Devices  Report    Peripheral Intravenous Line            Peripheral IV 01/02/22 Left Arm 4 days    Peripheral IV 01/02/22 Right;Upper 4 days          Drain            External Urinary Catheter Small 2 days                      Imaging: No pertinent imaging reviewed      Recent Cultures (last 7 days):         Last 24 Hours Medication List:   Current Facility-Administered Medications   Medication Dose Route Frequency Provider Last Rate    acetaminophen  650 mg Oral Q6H PRN Mercedez Bronson MD      aspirin  81 mg Oral Daily [place], [place]      benzonatate  100 mg Oral TID PRN Gerhardt Lineman, PA-C      calcium carbonate  1,000 mg Oral Daily PRN Mercedez Bronson MD      docusate sodium  100 mg Oral BID Mercedez Bronson MD      furosemide  40 mg Oral Daily [place], [place]      guaiFENesin  600 mg Oral Q12H [address] [place] Duck Dean, DO      heparin (porcine)  5,000 Units Subcutaneous [facility] Mercedez Bronson MD      levETIRAcetam  250 mg Oral BID Mercedez Bronson MD      metoprolol succinate  25 mg Oral Daily Mercedez Bronson MD      nicotine  1 patch Transdermal Daily Mercedez Bronson MD      ondansetron  4 mg Intravenous Q6H PRN Mercedez Bronson MD      pravastatin  80 mg Oral Daily With JESSICA Jang      senna  1 tablet Oral Daily Rick Bañuelos MD      thiamine  100 mg Oral Daily Rick Bañuelos MD          Today, Patient Was Seen By: Rosario Moran DO    **Please Note: This note may have been constructed using a voice recognition system  **

## 2022-01-06 NOTE — PHYSICAL THERAPY NOTE
Physical Therapy Evaluation     Patient's Name: Horacio Brothers    Admitting Diagnosis  SOB (shortness of breath) [R06 02]  CHF exacerbation ([facility] [address] ) [I50 9]    Problem List  Patient Active Problem List   Diagnosis    Hypertension    Hyperlipidemia    CAD (coronary artery disease)    Benign prostatic hyperplasia    Actinic keratosis    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, or chronic kidney disease (HCC)    Seizure (HCC)    Acute gout involving toe of left foot    Stage 3b chronic kidney disease ([facility] [address] )    Acute systolic heart failure ([facility] [address] )    Acute on chronic respiratory failure with hypoxia ([facility] [address] )     Past Medical History  Past Medical History:   Diagnosis Date    Arthritis     CHF (congestive heart failure) (HCC)     Coronary artery disease     Esophageal reflux     History of transfusion     "MANY YEARS AGO"    Hyperlipidemia     Hypertension     Renal disorder     PT DENIES, BUT ON PROBLEM LIST    Stroke ([facility] [address] )     NOT SURE     Past Surgical History  Past Surgical History:   Procedure Laterality Date    ABDOMINAL SURGERY      BACK SURGERY      CARDIAC SURGERY      COLON SURGERY      CORONARY ARTERY BYPASS GRAFT      FRACTURE SURGERY      TONSILLECTOMY      Last Assessed: 12/5/2016 01/06/22 0853   PT Last Visit   PT Visit Date 01/06/22   Note Type   Note type Evaluation   Pain Assessment   Pain Assessment Tool 0-10   Pain Score No Pain   Restrictions/Precautions   Weight Bearing Precautions Per Order No   Braces or Orthoses Other (Comment)  (none per patient)   Other Precautions Chair Alarm; Bed Alarm; Fall Risk;O2;Seizure  (+3L O2 via NC)   Home Living   Type of [address] [place] [address] Two level;Bed/bath upstairs  (No RIGOBERTO; 7 steps to bedroom)   Bathroom Shower/Tub Tub/shower unit   [employer] Raised   [employer] Grab bars in shower; Shower chair;Grab bars around toilet   Bathroom Accessibility Accessible   Home Equipment Walker   Additional Comments Per pt, he primarly ambulates without an AD   Prior Function   Level of [place] Independent with ADLs and functional mobility   Lives With Daughter;Family   Receives Help From Family;Home health  (private aide 9am-2pm, M-F)   ADL Assistance Needs assistance   IADLs Needs assistance   Falls in the last 6 months 0   Vocational Retired   General   Family/Caregiver Present No   Cognition   Overall Cognitive Status   (questionable)   Arousal/Participation Alert   Orientation Level Oriented X4  (increased time for processing)   Memory Within functional limits   Following Commands Follows one step commands without difficulty   Comments Pt agreeable to PT    RLE Assessment   RLE Assessment X   Strength RLE   RLE Overall Strength 3+/5   LLE Assessment   LLE Assessment X   Strength LLE   LLE Overall Strength 3+/5   Coordination   Movements are Fluid and Coordinated 1   Light Touch   RLE Light Touch Grossly intact   LLE Light Touch Grossly intact   Bed Mobility   Rolling R 4  Minimal assistance   Additional items Assist x 1; Increased time required;Verbal cues;LE management;HOB elevated; Bedrails   Supine to Sit 4  Minimal assistance   Additional items Assist x 1; Increased time required;Verbal cues;LE management;HOB elevated; Bedrails   Transfers   Sit to Stand 4  Minimal assistance   Additional items Assist x 1; Increased time required;Verbal cues   Stand to Sit 4  Minimal assistance   Additional items Assist x 1; Increased time required;Verbal cues;Armrests   Ambulation/Elevation   Gait pattern Decreased toe off;Decreased heel strike;Decreased hip extension; Excessively slow; Step to;Short stride;Decreased foot clearance   Gait Assistance 4  Minimal assist   Additional items Assist x 1;Verbal cues   Assistive Device Rolling walker   Distance 10 Feet   Stair Management Assistance Not tested   Balance   Static Sitting Fair   Dynamic Sitting Fair -   Static Standing Poor +   Dynamic Standing Poor +   Ambulatory Poor +   Endurance Deficit   Endurance Deficit Yes   Endurance Deficit Description decreased activity tolerance; pt was received on 3L O2 via NC; SpO2 decreased to ~86% with functional mobility, increased to >91% with seated rest breaks and cues for proper breathing techniques   Activity Tolerance   Activity Tolerance Patient tolerated treatment well   Medical Staff Made Aware PT Pepper   Nurse Made Aware MISSY Beck made aware of session  Post session the pt was left in the recliner and was in NAD  The pt's chair alarm was activated and all belongings wihtin reach     Assessment   Prognosis Good   Problem List Decreased strength;Decreased endurance; Impaired balance;Decreased mobility; Impaired hearing   Assessment Pt is a 80year old male who presents with acute systolic heart failure on 1/2/22 at [facility]  PT orders received for evaluation and treat  Pt has a PMH of hyperlipidemia, CAD, benign prostatic hyperplasia, seizure, stage 3b chronic kidney disease, and acute on chronic respiratory failure with hypoxia  Prior to IE the pt was independent with ADLs and functional mobility  The pt received assistance with his ADLs and IADLs from his family and from a caregiver  Per the pt, he ambulated household distances without the use of an AD and would ambulate with a rolling walker when going out to dinner  The pt lives in a 2-story home with 0 RIGOBERTO with a handrail and 7 steps to the second floor  At IE the patient presents with the following impairments including; decreased LE strength, decreased endurance, decreased mobility, impaired balance, gait deviations, decreased activity tolerance, and increased fall risk  The pt presents with increased difficulty when performing bed mobility, sit to stand transfers, and ambulation   The pt requires verbal cues and a min A x1 for bed mobility, sit to stand transfers, and ambulation in order to ensure patient safety  The pt currently requires a rolling walker for all functional mobility  The pt is currently requiring use of supplemental oxygen  The pt scores a 17 on the AM-PAC and a 40/100 on the Barthel index  The patient presents as an unstable-unpredictable complexity case due to above impairments and need for continued medical management  PT recommendation at time of d/c is for home with home PT pending progress to help pt return to PLOF  Barriers to Discharge Inaccessible home environment   Goals   STG Expiration Date 01/16/22   Short Term Goal #1 In 7 to 10 days: Pt will increase strength by 1/2 a grade in order to increase ease with transfers, Pt will be able to perform bed mobility with a mod I in order to decrease caregiver burden, Pt will be able to perform transfers with a mod I to improve mobility  Pt will be able to ambulate >100 feet with a RW and mod I in order to increase independence navigating household distances, pt will improve gross balance by 1/2 a grade to decrease risk of falls  The pt will be able to navigate 7 steps with a hand rail, with most appropriate AD, and mod I to improve home accessibility  Plan   Treatment/Interventions Functional transfer training;LE strengthening/ROM; Therapeutic exercise; Endurance training;Cognitive reorientation;Patient/family training;Bed mobility;Gait training;Elevations; Spoke to nursing   PT Frequency 3-5x/wk   Recommendation   PT Discharge Recommendation Home with home health rehabilitation   [address] [place] [address] Mobility Inpatient   Turning in Bed Without Bedrails 3   Lying on Back to Sitting on Edge of Flat Bed 3   Moving Bed to Chair 3   Standing Up From Chair 3   Walk in Room 3   Climb 3-5 Stairs 2   Basic Mobility Inpatient Raw Score 17   Basic Mobility Standardized Score 39 67   Highest Level Of Mobility   -HL Goal 5: Stand one or more mins   -HL Highest Level of Mobility 6: Walk 10 steps or more   -HLM Goal Achieved Yes   Modified Parker Scale Modified [place] Scale 4   Barthel Index   Feeding 10   Bathing 0   Grooming Score 0   Dressing Score 5   Bladder Score 0   Bowels Score 10   Toilet Use Score 5   Transfers (Bed/Chair) Score 10   Mobility (Level Surface) Score 0   Stairs Score 0   Barthel Index Score 40     PT Evaluation Time: 0615-6252    Rhoda Witt, SPT

## 2022-01-06 NOTE — PLAN OF CARE
Problem: MOBILITY - ADULT  Goal: Maintain or return to baseline ADL function  Description: INTERVENTIONS:  -  Assess patient's ability to carry out ADLs; assess patient's baseline for ADL function and identify physical deficits which impact ability to perform ADLs (bathing, care of mouth/teeth, toileting, grooming, dressing, etc )  - Assess/evaluate cause of self-care deficits   - Assess range of motion  - Assess patient's mobility; develop plan if impaired  - Assess patient's need for assistive devices and provide as appropriate  - Encourage maximum independence but intervene and supervise when necessary  - Involve family in performance of ADLs  - Assess for home care needs following discharge   - Consider OT consult to assist with ADL evaluation and planning for discharge  - Provide patient education as appropriate  Outcome: Progressing  Goal: Maintains/Returns to pre admission functional level  Description: INTERVENTIONS:  - Perform BMAT or MOVE assessment daily    - Set and communicate daily mobility goal to care team and patient/family/caregiver  - Collaborate with rehabilitation services on mobility goals if consulted  - Perform Range of Motion 2 times a day  - Reposition patient every 2 hours    - Dangle patient 2 times a day  - Stand patient 2 times a day  - Ambulate patient 2 times a day  - Out of bed to chair 2 times a day   - Out of bed for meals 2 times a day  - Out of bed for toileting  - Record patient progress and toleration of activity level   Outcome: Progressing     Problem: PAIN - ADULT  Goal: Verbalizes/displays adequate comfort level or baseline comfort level  Description: Interventions:  - Encourage patient to monitor pain and request assistance  - Assess pain using appropriate pain scale  - Administer analgesics based on type and severity of pain and evaluate response  - Implement non-pharmacological measures as appropriate and evaluate response  - Consider cultural and social influences on pain and pain management  - Notify physician/advanced practitioner if interventions unsuccessful or patient reports new pain  Outcome: Progressing     Problem: INFECTION - ADULT  Goal: Absence or prevention of progression during hospitalization  Description: INTERVENTIONS:  - Assess and monitor for signs and symptoms of infection  - Monitor lab/diagnostic results  - Monitor all insertion sites, i e  indwelling lines, tubes, and drains  - Monitor endotracheal if appropriate and nasal secretions for changes in amount and color  - [place] appropriate cooling/warming therapies per order  - Administer medications as ordered  - Instruct and encourage patient and family to use good hand hygiene technique  - Identify and instruct in appropriate isolation precautions for identified infection/condition  Outcome: Progressing  Goal: Absence of fever/infection during neutropenic period  Description: INTERVENTIONS:  - Monitor WBC    Outcome: Progressing     Problem: SAFETY ADULT  Goal: Maintain or return to baseline ADL function  Description: INTERVENTIONS:  -  Assess patient's ability to carry out ADLs; assess patient's baseline for ADL function and identify physical deficits which impact ability to perform ADLs (bathing, care of mouth/teeth, toileting, grooming, dressing, etc )  - Assess/evaluate cause of self-care deficits   - Assess range of motion  - Assess patient's mobility; develop plan if impaired  - Assess patient's need for assistive devices and provide as appropriate  - Encourage maximum independence but intervene and supervise when necessary  - Involve family in performance of ADLs  - Assess for home care needs following discharge   - Consider OT consult to assist with ADL evaluation and planning for discharge  - Provide patient education as appropriate  Outcome: Progressing    Goal: Patient will remain free of falls  Description: INTERVENTIONS:  - Educate patient/family on patient safety including physical limitations  - Instruct patient to call for assistance with activity   - Consult OT/PT to assist with strengthening/mobility   - Keep Call bell within reach  - Keep bed low and locked with side rails adjusted as appropriate  - Keep care items and personal belongings within reach  - Initiate and maintain comfort rounds  - Make Fall Risk Sign visible to staff  - Offer Toileting every 2 Hours, in advance of need  - Initiate/Maintain bedalarm  - Obtain necessary fall risk management equipment:   - Apply yellow socks and bracelet for high fall risk patients  - Consider moving patient to room near nurses station  Outcome: Progressing     Problem: DISCHARGE PLANNING  Goal: Discharge to home or other facility with appropriate resources  Description: INTERVENTIONS:  - Identify barriers to discharge w/patient and caregiver  - Arrange for needed discharge resources and transportation as appropriate  - Identify discharge learning needs (meds, wound care, etc )  - Arrange for interpretive services to assist at discharge as needed  - Refer to Case Management Department for coordinating discharge planning if the patient needs post-hospital services based on physician/advanced practitioner order or complex needs related to functional status, cognitive ability, or social support system  Outcome: Progressing     Problem: Prexisting or High Potential for Compromised Skin Integrity  Goal: Skin integrity is maintained or improved  Description: INTERVENTIONS:  - Identify patients at risk for skin breakdown  - Assess and monitor skin integrity  - Assess and monitor nutrition and hydration status  - Monitor labs   - Assess for incontinence   - Turn and reposition patient  - Assist with mobility/ambulation  - Relieve pressure over bony prominences  - Avoid friction and shearing  - Provide appropriate hygiene as needed including keeping skin clean and dry  - Evaluate need for skin moisturizer/barrier cream  - Collaborate with interdisciplinary team   - Patient/family teaching  - Consider wound care consult   Outcome: Progressing       Problem: Nutrition/Hydration-ADULT  Goal: Nutrient/Hydration intake appropriate for improving, restoring or maintaining nutritional needs  Description: Monitor and assess patient's nutrition/hydration status for malnutrition  Collaborate with interdisciplinary team and initiate plan and interventions as ordered  Monitor patient's weight and dietary intake as ordered or per policy  Utilize nutrition screening tool and intervene as necessary  Determine patient's food preferences and provide high-protein, high-caloric foods as appropriate       INTERVENTIONS:  - Monitor oral intake, urinary output, labs, and treatment plans  - Assess nutrition and hydration status and recommend course of action  - Evaluate amount of meals eaten  - Assist patient with eating if necessary   - Allow adequate time for meals  - Recommend/ encourage appropriate diets, oral nutritional supplements, and vitamin/mineral supplements  - Order, calculate, and assess calorie counts as needed  - Recommend, monitor, and adjust tube feedings and TPN/PPN based on assessed needs  - Assess need for intravenous fluids  - Provide specific nutrition/hydration education as appropriate  - Include patient/family/caregiver in decisions related to nutrition  Outcome: Progressing

## 2022-01-06 NOTE — PLAN OF CARE
Problem: MOBILITY - ADULT  Goal: Maintain or return to baseline ADL function  Description: INTERVENTIONS:  -  Assess patient's ability to carry out ADLs; assess patient's baseline for ADL function and identify physical deficits which impact ability to perform ADLs (bathing, care of mouth/teeth, toileting, grooming, dressing, etc )  - Assess/evaluate cause of self-care deficits   - Assess range of motion  - Assess patient's mobility; develop plan if impaired  - Assess patient's need for assistive devices and provide as appropriate  - Encourage maximum independence but intervene and supervise when necessary  - Involve family in performance of ADLs  - Assess for home care needs following discharge   - Consider OT consult to assist with ADL evaluation and planning for discharge  - Provide patient education as appropriate  1/6/2022 1037 by Anaya Frye RN  Outcome: Progressing  1/6/2022 1037 by Anaya Frye RN    Goal: Maintains/Returns to pre admission functional level  Description: INTERVENTIONS:  - Perform BMAT or MOVE assessment daily    - Set and communicate daily mobility goal to care team and patient/family/caregiver  - Collaborate with rehabilitation services on mobility goals if consulted  - Perform Range of Motion  times a day  - Reposition patient every  hours    - Dangle patient  times a day  - Stand patient  times a day  - Ambulate patient  times a day  - Out of bed to chair  times a day   - Out of bed for meals  times a day  - Out of bed for toileting  - Record patient progress and toleration of activity level   1/6/2022 1037 by Anaya Frye RN  Outcome: Progressing  1/6/2022 1037 by Anaya Frye RN       Problem: PAIN - ADULT  Goal: Verbalizes/displays adequate comfort level or baseline comfort level  Description: Interventions:  - Encourage patient to monitor pain and request assistance  - Assess pain using appropriate pain scale  - Administer analgesics based on type and severity of pain and evaluate response  - Implement non-pharmacological measures as appropriate and evaluate response  - Consider cultural and social influences on pain and pain management  - Notify physician/advanced practitioner if interventions unsuccessful or patient reports new pain  1/6/2022 1037 by Arlean Boeck, RN  Outcome: Progressing  1/6/2022 1037 by Arlean Boeck, RN       Problem: INFECTION - ADULT  Goal: Absence or prevention of progression during hospitalization  Description: INTERVENTIONS:  - Assess and monitor for signs and symptoms of infection  - Monitor lab/diagnostic results  - Monitor all insertion sites, i e  indwelling lines, tubes, and drains  - Monitor endotracheal if appropriate and nasal secretions for changes in amount and color  - [place] appropriate cooling/warming therapies per order  - Administer medications as ordered  - Instruct and encourage patient and family to use good hand hygiene technique  - Identify and instruct in appropriate isolation precautions for identified infection/condition  1/6/2022 1037 by Arlean Boeck, RN  Outcome: Progressing  1/6/2022 1037 by Arlean Boeck, RN    Goal: Absence of fever/infection during neutropenic period  Description: INTERVENTIONS:  - Monitor WBC    1/6/2022 1037 by Arlean Boeck, RN  Outcome: Progressing  1/6/2022 1037 by Arlean Boeck, RN       Problem: SAFETY ADULT  Goal: Maintain or return to baseline ADL function  Description: INTERVENTIONS:  -  Assess patient's ability to carry out ADLs; assess patient's baseline for ADL function and identify physical deficits which impact ability to perform ADLs (bathing, care of mouth/teeth, toileting, grooming, dressing, etc )  - Assess/evaluate cause of self-care deficits   - Assess range of motion  - Assess patient's mobility; develop plan if impaired  - Assess patient's need for assistive devices and provide as appropriate  - Encourage maximum independence but intervene and supervise when necessary  - Involve family in performance of ADLs  - Assess for home care needs following discharge   - Consider OT consult to assist with ADL evaluation and planning for discharge  - Provide patient education as appropriate  1/6/2022 1037 by Marie Allen RN  Outcome: Progressing  1/6/2022 1037 by Marie Allen RN    Goal: Maintains/Returns to pre admission functional level  Description: INTERVENTIONS:  - Perform BMAT or MOVE assessment daily    - Set and communicate daily mobility goal to care team and patient/family/caregiver  - Collaborate with rehabilitation services on mobility goals if consulted  - Perform Range of Motion  times a day  - Reposition patient every  hours    - Dangle patient  times a day  - Stand patient  times a day  - Ambulate patient  times a day  - Out of bed to chair  times a day   - Out of bed for meal times a day  - Out of bed for toileting  - Record patient progress and toleration of activity level   1/6/2022 1037 by Marie Allen RN  Outcome: Progressing  1/6/2022 1037 by Marie Allen RN    Goal: Patient will remain free of falls  Description: INTERVENTIONS:  - Educate patient/family on patient safety including physical limitations  - Instruct patient to call for assistance with activity   - Consult OT/PT to assist with strengthening/mobility   - Keep Call bell within reach  - Keep bed low and locked with side rails adjusted as appropriate  - Keep care items and personal belongings within reach  - Initiate and maintain comfort rounds  - Make Fall Risk Sign visible to staff  - Offer Toileting every  Hours, in advance of need  - Initiate/Maintain alarm  - Obtain necessary fall risk management equipment:   - Apply yellow socks and bracelet for high fall risk patients  - Consider moving patient to room near nurses station  1/6/2022 1037 by Marie Allen RN  Outcome: Progressing  1/6/2022 1037 by Marie Allen RN       Problem: DISCHARGE PLANNING  Goal: Discharge to home or other facility with appropriate resources  Description: INTERVENTIONS:  - Identify barriers to discharge w/patient and caregiver  - Arrange for needed discharge resources and transportation as appropriate  - Identify discharge learning needs (meds, wound care, etc )  - Arrange for interpretive services to assist at discharge as needed  - Refer to Case Management Department for coordinating discharge planning if the patient needs post-hospital services based on physician/advanced practitioner order or complex needs related to functional status, cognitive ability, or social support system  1/6/2022 1037 by Nigel Crouch RN  Outcome: Progressing  1/6/2022 1037 by Nigel Crouch RN       Problem: Prexisting or High Potential for Compromised Skin Integrity  Goal: Skin integrity is maintained or improved  Description: INTERVENTIONS:  - Identify patients at risk for skin breakdown  - Assess and monitor skin integrity  - Assess and monitor nutrition and hydration status  - Monitor labs   - Assess for incontinence   - Turn and reposition patient  - Assist with mobility/ambulation  - Relieve pressure over bony prominences  - Avoid friction and shearing  - Provide appropriate hygiene as needed including keeping skin clean and dry  - Evaluate need for skin moisturizer/barrier cream  - Collaborate with interdisciplinary team   - Patient/family teaching  - Consider wound care consult   1/6/2022 1037 by Nigel Crouch RN  Outcome: Progressing  1/6/2022 1037 by Nigel Crouch RN       Problem: Potential for Falls  Goal: Patient will remain free of falls  Description: INTERVENTIONS:  - Educate patient/family on patient safety including physical limitations  - Instruct patient to call for assistance with activity   - Consult OT/PT to assist with strengthening/mobility   - Keep Call bell within reach  - Keep bed low and locked with side rails adjusted as appropriate  - Keep care items and personal belongings within reach  - Initiate and maintain comfort rounds  - Make Fall Risk Sign visible to staff  - Offer Toileting every  Hours, in advance of need  - Initiate/Maintain alarm  - Obtain necessary fall risk management equipment:   - Apply yellow socks and bracelet for high fall risk patients  - Consider moving patient to room near nurses station  1/6/2022 1037 by Sharonda Bradshaw RN  Outcome: Progressing  1/6/2022 1037 by Sharonda Bradshaw RN       Problem: Nutrition/Hydration-ADULT  Goal: Nutrient/Hydration intake appropriate for improving, restoring or maintaining nutritional needs  Description: Monitor and assess patient's nutrition/hydration status for malnutrition  Collaborate with interdisciplinary team and initiate plan and interventions as ordered  Monitor patient's weight and dietary intake as ordered or per policy  Utilize nutrition screening tool and intervene as necessary  Determine patient's food preferences and provide high-protein, high-caloric foods as appropriate       INTERVENTIONS:  - Monitor oral intake, urinary output, labs, and treatment plans  - Assess nutrition and hydration status and recommend course of action  - Evaluate amount of meals eaten  - Assist patient with eating if necessary   - Allow adequate time for meals  - Recommend/ encourage appropriate diets, oral nutritional supplements, and vitamin/mineral supplements  - Order, calculate, and assess calorie counts as needed  - Recommend, monitor, and adjust tube feedings and TPN/PPN based on assessed needs  - Assess need for intravenous fluids  - Provide specific nutrition/hydration education as appropriate  - Include patient/family/caregiver in decisions related to nutrition  1/6/2022 1037 by Sharonda Bradshaw RN  Outcome: Progressing  1/6/2022 1037 by Sharonda Bradshaw RN

## 2022-01-06 NOTE — PLAN OF CARE
Problem: PHYSICAL THERAPY ADULT  Goal: Performs mobility at highest level of function for planned discharge setting  See evaluation for individualized goals  Description: Treatment/Interventions: Functional transfer training,LE strengthening/ROM,Therapeutic exercise,Endurance training,Cognitive reorientation,Patient/family training,Bed mobility,Gait training,Elevations,Spoke to nursing          See flowsheet documentation for full assessment, interventions and recommendations  Note: Prognosis: Good  Problem List: Decreased strength,Decreased endurance,Impaired balance,Decreased mobility,Impaired hearing  Assessment: Pt is a 80year old male who presents with acute systolic heart failure on 1/2/22 at [address] [place] [address]  PT orders received for evaluation and treat  Pt has a PMH of hyperlipidemia, CAD, benign prostatic hyperplasia, seizure, stage 3b chronic kidney disease, and acute on chronic respiratory failure with hypoxia  Prior to IE the pt was independent with ADLs and functional mobility  The pt received assistance with his ADLs and IADLs from his family and from a caregiver  Per the pt, he ambulated household distances without the use of an AD and would ambulate with a rolling walker when going out to dinner  The pt lives in a 2-story home with 0 [address] with a handrail and 7 steps to the second floor  At IE the patient presents with the following impairments including; decreased LE strength, decreased endurance, decreased mobility, impaired balance, gait deviations, decreased activity tolerance, and increased fall risk  The pt presents with increased difficulty when performing bed mobility, sit to stand transfers, and ambulation  The pt requires verbal cues and a min A x1 for bed mobility, sit to stand transfers, and ambulation in order to ensure patient safety  The pt currently requires a rolling walker for all functional mobility  The pt is currently requiring use of supplemental oxygen   The pt scores a 17 on the AM-PAC and a 40/100 on the Barthel index  The patient presents as an unstable-unpredictable complexity case due to above impairments and need for continued medical management  PT recommendation at time of d/c is for home with home PT pending progress to help pt return to PLOF  Barriers to Discharge: Inaccessible home environment        PT Discharge Recommendation: Home with home health rehabilitation          See flowsheet documentation for full assessment

## 2022-01-07 PROBLEM — E87.1 HYPONATREMIA: Status: ACTIVE | Noted: 2022-01-07

## 2022-01-07 LAB
ANION GAP SERPL CALCULATED.3IONS-SCNC: 14 MMOL/L (ref 4–13)
BUN SERPL-MCNC: 37 MG/DL (ref 5–25)
CALCIUM SERPL-MCNC: 9.1 MG/DL (ref 8.3–10.1)
CHLORIDE SERPL-SCNC: 91 MMOL/L (ref 100–108)
CO2 SERPL-SCNC: 22 MMOL/L (ref 21–32)
CREAT SERPL-MCNC: 1.69 MG/DL (ref 0.6–1.3)
GFR SERPL CREATININE-BSD FRML MDRD: 34 ML/MIN/1.73SQ M
GLUCOSE SERPL-MCNC: 88 MG/DL (ref 65–140)
POTASSIUM SERPL-SCNC: 4.1 MMOL/L (ref 3.5–5.3)
SODIUM SERPL-SCNC: 127 MMOL/L (ref 136–145)

## 2022-01-07 PROCEDURE — 99232 SBSQ HOSP IP/OBS MODERATE 35: CPT | Performed by: INTERNAL MEDICINE

## 2022-01-07 PROCEDURE — 80048 BASIC METABOLIC PNL TOTAL CA: CPT | Performed by: INTERNAL MEDICINE

## 2022-01-07 RX ORDER — FUROSEMIDE 20 MG/1
20 TABLET ORAL DAILY
Status: DISCONTINUED | OUTPATIENT
Start: 2022-01-07 | End: 2022-01-08 | Stop reason: HOSPADM

## 2022-01-07 RX ADMIN — THIAMINE HCL TAB 100 MG 100 MG: 100 TAB at 09:15

## 2022-01-07 RX ADMIN — PRAVASTATIN SODIUM 80 MG: 80 TABLET ORAL at 17:24

## 2022-01-07 RX ADMIN — LEVETIRACETAM 250 MG: 250 TABLET, FILM COATED ORAL at 17:24

## 2022-01-07 RX ADMIN — LEVETIRACETAM 250 MG: 250 TABLET, FILM COATED ORAL at 09:14

## 2022-01-07 RX ADMIN — HEPARIN SODIUM 5000 UNITS: 5000 INJECTION INTRAVENOUS; SUBCUTANEOUS at 21:43

## 2022-01-07 RX ADMIN — GUAIFENESIN 600 MG: 600 TABLET ORAL at 21:43

## 2022-01-07 RX ADMIN — GUAIFENESIN 600 MG: 600 TABLET ORAL at 09:15

## 2022-01-07 RX ADMIN — DOCUSATE SODIUM 100 MG: 100 CAPSULE, LIQUID FILLED ORAL at 17:24

## 2022-01-07 RX ADMIN — METOPROLOL SUCCINATE 25 MG: 25 TABLET, FILM COATED, EXTENDED RELEASE ORAL at 09:15

## 2022-01-07 RX ADMIN — FUROSEMIDE 20 MG: 20 TABLET ORAL at 09:15

## 2022-01-07 RX ADMIN — ASPIRIN 81 MG: 81 TABLET, COATED ORAL at 09:15

## 2022-01-07 RX ADMIN — HEPARIN SODIUM 5000 UNITS: 5000 INJECTION INTRAVENOUS; SUBCUTANEOUS at 13:20

## 2022-01-07 RX ADMIN — BENZONATATE 100 MG: 100 CAPSULE ORAL at 01:12

## 2022-01-07 NOTE — ASSESSMENT & PLAN NOTE
- secondary to diuresis  - Lasix has been decreased to 20 mg daily  - repeat BMP in the morning  - consider nephrology consultation tomorrow if hyponatremia continues to worsen

## 2022-01-07 NOTE — ASSESSMENT & PLAN NOTE
- presents with generalized weakness, cough, shortness of breath  - follows with Cardiology at [facility] in [place]  - last echo in 2020 showed an LV EF of 40-45%; repeat echocardiogram here shows similar EF of 35-40% with moderate global hypokinesis and grade 1 diastolic dysfunction  Moderate to severe mitral regurgitation also noted  - decreased p o   Lasix to 20 mg daily  - cont metoprolol; no ACE/ARB due to CKD  - repeat BMP within 1 week of discharge  - patient to follow-up with his own cardiologist within 1-2 weeks of discharge

## 2022-01-07 NOTE — PROGRESS NOTES
Cardiology progress note:    Subjective:  Denies chest pain, shortness of breath or palpitation  ( I had detailed discussion with patient's daughter in person yesterday and bedside  I explained patient daughter about his current clinical condition  As per patient's daughter patient was not on diuretics at home  Also as per her n pt they prefer medical management and no invasive measures at present given held a rate)    Objective:  Not in distress    Labs including vitals reviewed  Creatinine 1 69  Physical examination:  Awake, alert and oriented x3, hard of hearing,  bilateral entry positive no wheeze or crackle, S1-S2 normal no murmur, abdomen soft nondistended nontender bowel sounds positive, no leg edema    Assessment:  1  Acute on chronic systolic heart failure  LVEF 35-40%, mild diffuse hypokinesis, moderate to severe MR  Euvolemic  2  Coronary artery disease status post CABG in 2006  Denies chest pain  3  Moderate to severe mitral regurgitation  4  Mildly elevated troponin likely due to heart failure  5  Hypertension  6  Hyperlipidemia  7  CKD  8  Carotid artery stenosis  9  History of seizure  10  Anemia  11  Dementia    Recommendation:  Continue oral Lasix 20 mg daily  Continue aspirin, statin and Toprol-XL  Not on Ace or Arb given CKD  Management of hyponatremia as per primary team  Patient to follow-up with primary cardiologist in [place]  We will sign off at present time  Please call us back p r n

## 2022-01-07 NOTE — PLAN OF CARE
Problem: MOBILITY - ADULT  Goal: Maintain or return to baseline ADL function  Description: INTERVENTIONS:  -  Assess patient's ability to carry out ADLs; assess patient's baseline for ADL function and identify physical deficits which impact ability to perform ADLs (bathing, care of mouth/teeth, toileting, grooming, dressing, etc )  - Assess/evaluate cause of self-care deficits   - Assess range of motion  - Assess patient's mobility; develop plan if impaired  - Assess patient's need for assistive devices and provide as appropriate  - Encourage maximum independence but intervene and supervise when necessary  - Involve family in performance of ADLs  - Assess for home care needs following discharge   - Consider OT consult to assist with ADL evaluation and planning for discharge  - Provide patient education as appropriate  Outcome: Progressing  Goal: Maintains/Returns to pre admission functional level  Description: INTERVENTIONS:  - Perform BMAT or MOVE assessment daily    - Set and communicate daily mobility goal to care team and patient/family/caregiver  - Collaborate with rehabilitation services on mobility goals if consulted  - Perform Range of Motion  times a day  - Reposition patient every  hours    - Dangle patient  times a day  - Stand patient  times a day  - Ambulate patient  times a day  - Out of bed to chair  times a day   - Out of bed for meals  times a day  - Out of bed for toileting  - Record patient progress and toleration of activity level   Outcome: Progressing     Problem: PAIN - ADULT  Goal: Verbalizes/displays adequate comfort level or baseline comfort level  Description: Interventions:  - Encourage patient to monitor pain and request assistance  - Assess pain using appropriate pain scale  - Administer analgesics based on type and severity of pain and evaluate response  - Implement non-pharmacological measures as appropriate and evaluate response  - Consider cultural and social influences on pain and pain management  - Notify physician/advanced practitioner if interventions unsuccessful or patient reports new pain  Outcome: Progressing     Problem: INFECTION - ADULT  Goal: Absence or prevention of progression during hospitalization  Description: INTERVENTIONS:  - Assess and monitor for signs and symptoms of infection  - Monitor lab/diagnostic results  - Monitor all insertion sites, i e  indwelling lines, tubes, and drains  - Monitor endotracheal if appropriate and nasal secretions for changes in amount and color  - Ulen appropriate cooling/warming therapies per order  - Administer medications as ordered  - Instruct and encourage patient and family to use good hand hygiene technique  - Identify and instruct in appropriate isolation precautions for identified infection/condition  Outcome: Progressing  Goal: Absence of fever/infection during neutropenic period  Description: INTERVENTIONS:  - Monitor WBC    Outcome: Progressing     Problem: SAFETY ADULT  Goal: Maintain or return to baseline ADL function  Description: INTERVENTIONS:  -  Assess patient's ability to carry out ADLs; assess patient's baseline for ADL function and identify physical deficits which impact ability to perform ADLs (bathing, care of mouth/teeth, toileting, grooming, dressing, etc )  - Assess/evaluate cause of self-care deficits   - Assess range of motion  - Assess patient's mobility; develop plan if impaired  - Assess patient's need for assistive devices and provide as appropriate  - Encourage maximum independence but intervene and supervise when necessary  - Involve family in performance of ADLs  - Assess for home care needs following discharge   - Consider OT consult to assist with ADL evaluation and planning for discharge  - Provide patient education as appropriate  Outcome: Progressing  Goal: Maintains/Returns to pre admission functional level  Description: INTERVENTIONS:  - Perform BMAT or MOVE assessment daily    - Set and communicate daily mobility goal to care team and patient/family/caregiver  - Collaborate with rehabilitation services on mobility goals if consulted  - Perform Range of Motion  times a day  - Reposition patient every  hours    - Dangle patient  times a day  - Stand patient  times a day  - Ambulate patient  times a day  - Out of bed to chair  times a day   - Out of bed for meals  times a day  - Out of bed for toileting  - Record patient progress and toleration of activity level   Outcome: Progressing  Goal: Patient will remain free of falls  Description: INTERVENTIONS:  - Educate patient/family on patient safety including physical limitations  - Instruct patient to call for assistance with activity   - Consult OT/PT to assist with strengthening/mobility   - Keep Call bell within reach  - Keep bed low and locked with side rails adjusted as appropriate  - Keep care items and personal belongings within reach  - Initiate and maintain comfort rounds  - Make Fall Risk Sign visible to staff  - Offer Toileting every  Hours, in advance of need  - Initiate/Maintain alarm  - Obtain necessary fall risk management equipment:   - Apply yellow socks and bracelet for high fall risk patients  - Consider moving patient to room near nurses station  Outcome: Progressing     Problem: DISCHARGE PLANNING  Goal: Discharge to home or other facility with appropriate resources  Description: INTERVENTIONS:  - Identify barriers to discharge w/patient and caregiver  - Arrange for needed discharge resources and transportation as appropriate  - Identify discharge learning needs (meds, wound care, etc )  - Arrange for interpretive services to assist at discharge as needed  - Refer to Case Management Department for coordinating discharge planning if the patient needs post-hospital services based on physician/advanced practitioner order or complex needs related to functional status, cognitive ability, or social support system  Outcome: Progressing Problem: Prexisting or High Potential for Compromised Skin Integrity  Goal: Skin integrity is maintained or improved  Description: INTERVENTIONS:  - Identify patients at risk for skin breakdown  - Assess and monitor skin integrity  - Assess and monitor nutrition and hydration status  - Monitor labs   - Assess for incontinence   - Turn and reposition patient  - Assist with mobility/ambulation  - Relieve pressure over bony prominences  - Avoid friction and shearing  - Provide appropriate hygiene as needed including keeping skin clean and dry  - Evaluate need for skin moisturizer/barrier cream  - Collaborate with interdisciplinary team   - Patient/family teaching  - Consider wound care consult   Outcome: Progressing     Problem: Potential for Falls  Goal: Patient will remain free of falls  Description: INTERVENTIONS:  - Educate patient/family on patient safety including physical limitations  - Instruct patient to call for assistance with activity   - Consult OT/PT to assist with strengthening/mobility   - Keep Call bell within reach  - Keep bed low and locked with side rails adjusted as appropriate  - Keep care items and personal belongings within reach  - Initiate and maintain comfort rounds  - Make Fall Risk Sign visible to staff  - Offer Toileting every  Hours, in advance of need  - Initiate/Maintain alarm  - Obtain necessary fall risk management equipment:  - Apply yellow socks and bracelet for high fall risk patients  - Consider moving patient to room near nurses station  Outcome: Progressing     Problem: Nutrition/Hydration-ADULT  Goal: Nutrient/Hydration intake appropriate for improving, restoring or maintaining nutritional needs  Description: Monitor and assess patient's nutrition/hydration status for malnutrition  Collaborate with interdisciplinary team and initiate plan and interventions as ordered  Monitor patient's weight and dietary intake as ordered or per policy   Utilize nutrition screening tool and intervene as necessary  Determine patient's food preferences and provide high-protein, high-caloric foods as appropriate       INTERVENTIONS:  - Monitor oral intake, urinary output, labs, and treatment plans  - Assess nutrition and hydration status and recommend course of action  - Evaluate amount of meals eaten  - Assist patient with eating if necessary   - Allow adequate time for meals  - Recommend/ encourage appropriate diets, oral nutritional supplements, and vitamin/mineral supplements  - Order, calculate, and assess calorie counts as needed  - Recommend, monitor, and adjust tube feedings and TPN/PPN based on assessed needs  - Assess need for intravenous fluids  - Provide specific nutrition/hydration education as appropriate  - Include patient/family/caregiver in decisions related to nutrition  Outcome: Progressing

## 2022-01-07 NOTE — PROGRESS NOTES
[address]  Progress Note Yaneth Arvizu 11/25/1927, 80 y o  male MRN: [id number]  Unit/Bed#: -Ludy Encounter: [id number]  Primary Care Provider: Marlene Cuello DO   Date and time admitted to hospital: 1/2/2022 12:51 PM    * Acute systolic heart failure ([facility] [address] )  Assessment & Plan  - presents with generalized weakness, cough, shortness of breath  - follows with Cardiology at [facility] in [place]  - last echo in 2020 showed an LV EF of 40-45%; repeat echocardiogram here shows similar EF of 35-40% with moderate global hypokinesis and grade 1 diastolic dysfunction  Moderate to severe mitral regurgitation also noted  - decreased p o  Lasix to 20 mg daily  - cont metoprolol; no ACE/ARB due to CKD  - monitor on telemetry  - daily weights  - monitor intake/output  - appreciate Cardiology recommendations    Hyponatremia  Assessment & Plan  - secondary to diuresis  - Lasix has been decreased to 20 mg daily  - repeat BMP in the morning  - consider nephrology consultation tomorrow if hyponatremia continues to worsen    Acute on chronic respiratory failure with hypoxia (HCC)  Assessment & Plan  - Secondary to decompensated heart failure  - now resolved with diuresis    Stage 3b chronic kidney disease ([facility] [address] )  Assessment & Plan  - renal function at baseline  - monitor closely with daily BMP while on IV diuretics    [facility]  Assessment & Plan  - Continue home dose keppra     Benign prostatic hyperplasia  Assessment & Plan  - Continue home dose flomax    CAD (coronary artery disease)  Assessment & Plan  - history of CABG in 2006 and abnormal stress test in 2020 medically managed    - no acute concerns from the standpoint at this point in time  - continue aspirin, statin, metoprolol       Hyperlipidemia  Assessment & Plan  - continue pravastatin    VTE Pharmacologic Prophylaxis: VTE Score: 7 High Risk (Score >/= 5) - Pharmacological DVT Prophylaxis Ordered: heparin  Sequential Compression Devices Ordered  Patient Centered Rounds: I performed bedside rounds with nursing staff today  Discussions with Specialists or Other Care Team Provider: Case Management  Cardiology  Education and Discussions with Family / Patient: Updated  (daughter) via phone  Time Spent for Care: 20 minutes  More than 50% of total time spent on counseling and coordination of care as described above  Current Length of Stay: 5 day(s)  Current Patient Status: Inpatient   Certification Statement: The patient will continue to require additional inpatient hospital stay due to diuresis and renal monitoring  Discharge Plan: Anticipate discharge tomorrow to home  Code Status: Level 1 - Full Code    Subjective:   Patient seen examined  Following up for acute CHF  Patient doing well today  On room air  Fortunately sodium lower today  Will need to monitor for another 24 hours  Otherwise patient has no new complaints  Objective:     Vitals:   Temp (24hrs), Av 6 °F (37 °C), Min:98 °F (36 7 °C), Max:99 1 °F (37 3 °C)    Temp:  [98 °F (36 7 °C)-99 1 °F (37 3 °C)] 98 °F (36 7 °C)  HR:  [77-82] 82  Resp:  [18-19] 19  BP: (109-118)/(62-70) 118/66  SpO2:  [94 %-96 %] 96 %  Body mass index is 30 23 kg/m²  Input and Output Summary (last 24 hours): Intake/Output Summary (Last 24 hours) at 2022 1436  Last data filed at 2022 1301  Gross per 24 hour   Intake --   Output 1150 ml   Net -1150 ml       PHYSICAL EXAM:    Vitals signs reviewed  Constitutional   Awake and cooperative  NAD  Head/Neck   Normocephalic  Atraumatic  HEENT   No scleral icterus  EOMI  Heart   Regular rate and rhythm  No murmers  Lungs   Clear to auscultation bilaterally  Respirations unlaboured  Abdomen   Soft  Nontender  Nondistended  Skin   Skin color normal  No rashes  Extremities   No deformities  No peripheral edema  Neuro   Alert and oriented  No new deficits  Psych   Mood stable  Affect normal          Additional Data:     Labs:  Results from last 7 days   Lab Units 01/04/22  0559   WBC Thousand/uL 9 84   HEMOGLOBIN g/dL 10 8*   HEMATOCRIT % 32 7*   PLATELETS Thousands/uL 398*   NEUTROS PCT % 75   LYMPHS PCT % 10*   MONOS PCT % 9   EOS PCT % 4     Results from last 7 days   Lab Units 01/07/22  0438 01/03/22  0506 01/02/22  1329   SODIUM mmol/L 127*   < > 130*   POTASSIUM mmol/L 4 1   < > 4 6   CHLORIDE mmol/L 91*   < > 95*   CO2 mmol/L 22   < > 16*   BUN mg/dL 37*   < > 29*   CREATININE mg/dL 1 69*   < > 1 71*   ANION GAP mmol/L 14*   < > 19*   CALCIUM mg/dL 9 1   < > 9 6   ALBUMIN g/dL  --   --  3 4*   TOTAL BILIRUBIN mg/dL  --   --  0 49   ALK PHOS U/L  --   --  75   ALT U/L  --   --  32   AST U/L  --   --  22   GLUCOSE RANDOM mg/dL 88   < > 167*    < > = values in this interval not displayed  Results from last 7 days   Lab Units 01/02/22  1426   INR  1 10                   Lines/Drains:  Invasive Devices  Report    Peripheral Intravenous Line            Peripheral IV 01/02/22 Left Arm 5 days    Peripheral IV 01/02/22 Right;Upper 5 days          Drain            External Urinary Catheter Small 3 days                      Imaging: No pertinent imaging reviewed      Recent Cultures (last 7 days):         Last 24 Hours Medication List:   Current Facility-Administered Medications   Medication Dose Route Frequency Provider Last Rate    acetaminophen  650 mg Oral Q6H PRN Mercedez Bronson MD      aspirin  81 mg Oral Daily Mayco Usama, [address]      benzonatate  100 mg Oral TID PRN Gerhardt Lineman, PA-C      calcium carbonate  1,000 mg Oral Daily PRN Mercedez Bronson MD      docusate sodium  100 mg Oral BID Mercedez Bronson MD      furosemide  20 mg Oral Daily Mayco Bongo, [address]      guaiFENesin  600 mg Oral Q12H [address] [place], DO      heparin (porcine)  5,000 Units Subcutaneous [facility] Mercedez Bronson MD      levETIRAcetam  250 mg Oral BID Mercedez Bronson MD      metoprolol succinate  25 mg Oral Daily Jazz Rajan MD      nicotine  1 patch Transdermal Daily Jazz Rajan MD      ondansetron  4 mg Intravenous Q6H PRN Jazz Rajan MD      pravastatin  80 mg Oral Daily With JESSICA Roth      senna  1 tablet Oral Daily Jazz Rajan MD      thiamine  100 mg Oral Daily Jazz Rajan MD          Today, Patient Was Seen By: Gilma Moran DO    **Please Note: This note may have been constructed using a voice recognition system  **

## 2022-01-08 VITALS
SYSTOLIC BLOOD PRESSURE: 117 MMHG | HEART RATE: 74 BPM | OXYGEN SATURATION: 99 % | WEIGHT: 173.28 LBS | TEMPERATURE: 98.3 F | BODY MASS INDEX: 30.7 KG/M2 | RESPIRATION RATE: 19 BRPM | DIASTOLIC BLOOD PRESSURE: 94 MMHG | HEIGHT: 63 IN

## 2022-01-08 LAB
ANION GAP SERPL CALCULATED.3IONS-SCNC: 15 MMOL/L (ref 4–13)
BUN SERPL-MCNC: 39 MG/DL (ref 5–25)
CALCIUM SERPL-MCNC: 9.3 MG/DL (ref 8.3–10.1)
CHLORIDE SERPL-SCNC: 90 MMOL/L (ref 100–108)
CO2 SERPL-SCNC: 23 MMOL/L (ref 21–32)
CREAT SERPL-MCNC: 1.81 MG/DL (ref 0.6–1.3)
GFR SERPL CREATININE-BSD FRML MDRD: 31 ML/MIN/1.73SQ M
GLUCOSE SERPL-MCNC: 89 MG/DL (ref 65–140)
POTASSIUM SERPL-SCNC: 3.7 MMOL/L (ref 3.5–5.3)
SODIUM SERPL-SCNC: 128 MMOL/L (ref 136–145)

## 2022-01-08 PROCEDURE — 80048 BASIC METABOLIC PNL TOTAL CA: CPT | Performed by: INTERNAL MEDICINE

## 2022-01-08 PROCEDURE — 99239 HOSP IP/OBS DSCHRG MGMT >30: CPT | Performed by: INTERNAL MEDICINE

## 2022-01-08 RX ORDER — FUROSEMIDE 20 MG/1
20 TABLET ORAL DAILY
Qty: 30 TABLET | Refills: 0 | Status: SHIPPED | OUTPATIENT
Start: 2022-01-09 | End: 2022-01-21 | Stop reason: SDUPTHER

## 2022-01-08 RX ADMIN — STANDARDIZED SENNA CONCENTRATE 8.6 MG: 8.6 TABLET ORAL at 09:09

## 2022-01-08 RX ADMIN — LEVETIRACETAM 250 MG: 250 TABLET, FILM COATED ORAL at 09:09

## 2022-01-08 RX ADMIN — HEPARIN SODIUM 5000 UNITS: 5000 INJECTION INTRAVENOUS; SUBCUTANEOUS at 05:16

## 2022-01-08 RX ADMIN — METOPROLOL SUCCINATE 25 MG: 25 TABLET, FILM COATED, EXTENDED RELEASE ORAL at 09:08

## 2022-01-08 RX ADMIN — ASPIRIN 81 MG: 81 TABLET, COATED ORAL at 09:09

## 2022-01-08 RX ADMIN — DOCUSATE SODIUM 100 MG: 100 CAPSULE, LIQUID FILLED ORAL at 09:09

## 2022-01-08 RX ADMIN — THIAMINE HCL TAB 100 MG 100 MG: 100 TAB at 09:09

## 2022-01-08 RX ADMIN — GUAIFENESIN 600 MG: 600 TABLET ORAL at 09:09

## 2022-01-08 RX ADMIN — FUROSEMIDE 20 MG: 20 TABLET ORAL at 09:09

## 2022-01-08 NOTE — PLAN OF CARE
Problem: MOBILITY - ADULT  Goal: Maintain or return to baseline ADL function  Description: INTERVENTIONS:  -  Assess patient's ability to carry out ADLs; assess patient's baseline for ADL function and identify physical deficits which impact ability to perform ADLs (bathing, care of mouth/teeth, toileting, grooming, dressing, etc )  - Assess/evaluate cause of self-care deficits   - Assess range of motion  - Assess patient's mobility; develop plan if impaired  - Assess patient's need for assistive devices and provide as appropriate  - Encourage maximum independence but intervene and supervise when necessary  - Involve family in performance of ADLs  - Assess for home care needs following discharge   - Consider OT consult to assist with ADL evaluation and planning for discharge  - Provide patient education as appropriate  1/8/2022 1502 by Soraya Cabrales RN  Outcome: Completed  1/8/2022 1225 by Soraya Cabrales RN  Outcome: Progressing  Goal: Maintains/Returns to pre admission functional level  Description: INTERVENTIONS:  - Perform BMAT or MOVE assessment daily    - Set and communicate daily mobility goal to care team and patient/family/caregiver  - Collaborate with rehabilitation services on mobility goals if consulted  - Perform Range of Motion 3 times a day  - Reposition patient every 2 hours    - Dangle patient 3 times a day  - Stand patient 3 times a day  - Ambulate patient 3 times a day  - Out of bed to chair 3 times a day   - Out of bed for meals 3 times a day  - Out of bed for toileting  - Record patient progress and toleration of activity level   1/8/2022 1502 by Soraya Cabrales RN  Outcome: Completed  1/8/2022 1225 by Soraya Cabrales RN  Outcome: Progressing     Problem: PAIN - ADULT  Goal: Verbalizes/displays adequate comfort level or baseline comfort level  Description: Interventions:  - Encourage patient to monitor pain and request assistance  - Assess pain using appropriate pain scale  - Administer analgesics based on type and severity of pain and evaluate response  - Implement non-pharmacological measures as appropriate and evaluate response  - Consider cultural and social influences on pain and pain management  - Notify physician/advanced practitioner if interventions unsuccessful or patient reports new pain  1/8/2022 1502 by Michael Ceron RN  Outcome: Completed  1/8/2022 1225 by Michael Ceron RN  Outcome: Progressing     Problem: INFECTION - ADULT  Goal: Absence or prevention of progression during hospitalization  Description: INTERVENTIONS:  - Assess and monitor for signs and symptoms of infection  - Monitor lab/diagnostic results  - Monitor all insertion sites, i e  indwelling lines, tubes, and drains  - Monitor endotracheal if appropriate and nasal secretions for changes in amount and color  - Mouthcard appropriate cooling/warming therapies per order  - Administer medications as ordered  - Instruct and encourage patient and family to use good hand hygiene technique  - Identify and instruct in appropriate isolation precautions for identified infection/condition  1/8/2022 1502 by Michael Ceron RN  Outcome: Completed  1/8/2022 1225 by Michael Ceron RN  Outcome: Progressing  Goal: Absence of fever/infection during neutropenic period  Description: INTERVENTIONS:  - Monitor WBC    1/8/2022 1502 by Michael Ceron RN  Outcome: Completed  1/8/2022 1225 by Michael Ceron RN  Outcome: Progressing     Problem: SAFETY ADULT  Goal: Maintain or return to baseline ADL function  Description: INTERVENTIONS:  -  Assess patient's ability to carry out ADLs; assess patient's baseline for ADL function and identify physical deficits which impact ability to perform ADLs (bathing, care of mouth/teeth, toileting, grooming, dressing, etc )  - Assess/evaluate cause of self-care deficits   - Assess range of motion  - Assess patient's mobility; develop plan if impaired  - Assess patient's need for assistive devices and provide as appropriate  - Encourage maximum independence but intervene and supervise when necessary  - Involve family in performance of ADLs  - Assess for home care needs following discharge   - Consider OT consult to assist with ADL evaluation and planning for discharge  - Provide patient education as appropriate  1/8/2022 1502 by Luz Boss RN  Outcome: Completed  1/8/2022 1225 by Luz Boss RN  Outcome: Progressing  Goal: Maintains/Returns to pre admission functional level  Description: INTERVENTIONS:  - Perform BMAT or MOVE assessment daily    - Set and communicate daily mobility goal to care team and patient/family/caregiver  - Collaborate with rehabilitation services on mobility goals if consulted  - Perform Range of Motion 3 times a day  - Reposition patient every 2 hours    - Dangle patient 3 times a day  - Stand patient 3 times a day  - Ambulate patient 3 times a day  - Out of bed to chair 3 times a day   - Out of bed for meals 3 times a day  - Out of bed for toileting  - Record patient progress and toleration of activity level   1/8/2022 1502 by Luz Boss RN  Outcome: Completed  1/8/2022 1225 by Luz Boss RN  Outcome: Progressing  Goal: Patient will remain free of falls  Description: INTERVENTIONS:  - Educate patient/family on patient safety including physical limitations  - Instruct patient to call for assistance with activity   - Consult OT/PT to assist with strengthening/mobility   - Keep Call bell within reach  - Keep bed low and locked with side rails adjusted as appropriate  - Keep care items and personal belongings within reach  - Initiate and maintain comfort rounds  - Make Fall Risk Sign visible to staff  - Offer Toileting every 2 Hours, in advance of need  - Initiate/Maintain bed alarm  - Obtain necessary fall risk management equipment  - Apply yellow socks and bracelet for high fall risk patients  - Consider moving patient to room near nurses station  1/8/2022 1502 by Chloe Knox RN  Outcome: Completed  1/8/2022 1225 by Chloe Knox RN  Outcome: Progressing     Problem: DISCHARGE PLANNING  Goal: Discharge to home or other facility with appropriate resources  Description: INTERVENTIONS:  - Identify barriers to discharge w/patient and caregiver  - Arrange for needed discharge resources and transportation as appropriate  - Identify discharge learning needs (meds, wound care, etc )  - Arrange for interpretive services to assist at discharge as needed  - Refer to Case Management Department for coordinating discharge planning if the patient needs post-hospital services based on physician/advanced practitioner order or complex needs related to functional status, cognitive ability, or social support system  1/8/2022 1502 by Chloe Knox RN  Outcome: Completed  1/8/2022 1225 by Chloe Knox RN  Outcome: Progressing     Problem: Prexisting or High Potential for Compromised Skin Integrity  Goal: Skin integrity is maintained or improved  Description: INTERVENTIONS:  - Identify patients at risk for skin breakdown  - Assess and monitor skin integrity  - Assess and monitor nutrition and hydration status  - Monitor labs   - Assess for incontinence   - Turn and reposition patient  - Assist with mobility/ambulation  - Relieve pressure over bony prominences  - Avoid friction and shearing  - Provide appropriate hygiene as needed including keeping skin clean and dry  - Evaluate need for skin moisturizer/barrier cream  - Collaborate with interdisciplinary team   - Patient/family teaching  - Consider wound care consult   1/8/2022 1502 by Chloe Knox RN  Outcome: Completed  1/8/2022 1225 by Chloe Knox RN  Outcome: Progressing     Problem: Potential for Falls  Goal: Patient will remain free of falls  Description: INTERVENTIONS:  - Educate patient/family on patient safety including physical limitations  - Instruct patient to call for assistance with activity   - Consult OT/PT to assist with strengthening/mobility   - Keep Call bell within reach  - Keep bed low and locked with side rails adjusted as appropriate  - Keep care items and personal belongings within reach  - Initiate and maintain comfort rounds  - Make Fall Risk Sign visible to staff  - Offer Toileting every 2 Hours, in advance of need  - Initiate/Maintain bed alarm  - Obtain necessary fall risk management equipment  - Apply yellow socks and bracelet for high fall risk patients  - Consider moving patient to room near nurses station  1/8/2022 1502 by Rafael Lackey RN  Outcome: Completed  1/8/2022 1225 by Rafael Lackey RN  Outcome: Progressing     Problem: Nutrition/Hydration-ADULT  Goal: Nutrient/Hydration intake appropriate for improving, restoring or maintaining nutritional needs  Description: Monitor and assess patient's nutrition/hydration status for malnutrition  Collaborate with interdisciplinary team and initiate plan and interventions as ordered  Monitor patient's weight and dietary intake as ordered or per policy  Utilize nutrition screening tool and intervene as necessary  Determine patient's food preferences and provide high-protein, high-caloric foods as appropriate       INTERVENTIONS:  - Monitor oral intake, urinary output, labs, and treatment plans  - Assess nutrition and hydration status and recommend course of action  - Evaluate amount of meals eaten  - Assist patient with eating if necessary   - Allow adequate time for meals  - Recommend/ encourage appropriate diets, oral nutritional supplements, and vitamin/mineral supplements  - Order, calculate, and assess calorie counts as needed  - Recommend, monitor, and adjust tube feedings and TPN/PPN based on assessed needs  - Assess need for intravenous fluids  - Provide specific nutrition/hydration education as appropriate  - Include patient/family/caregiver in decisions related to nutrition  1/8/2022 1502 by Rafael Lackey RN  Outcome: Completed  1/8/2022 1225 by Reyna Enriquez RN  Outcome: Progressing     Problem: PHYSICAL THERAPY ADULT  Goal: Performs mobility at highest level of function for planned discharge setting  See evaluation for individualized goals  Description: Treatment/Interventions: Functional transfer training,LE strengthening/ROM,Therapeutic exercise,Endurance training,Cognitive reorientation,Patient/family training,Bed mobility,Gait training,Elevations,Spoke to nursing          See flowsheet documentation for full assessment, interventions and recommendations    Outcome: Completed

## 2022-01-08 NOTE — DISCHARGE SUMMARY
[address]  Discharge- Sanjuanita Berumen 11/25/1927, 80 y o  male MRN: [id number]  Unit/Bed#: -Ludy Encounter: [id number]  Primary Care Provider: Leny Dow DO   Date and time admitted to hospital: 1/2/2022 12:51 PM    * Acute systolic heart failure ([facility] [address] )  Assessment & Plan  - presents with generalized weakness, cough, shortness of breath  - follows with Cardiology at [facility] in [place]  - last echo in 2020 showed an LV EF of 40-45%; repeat echocardiogram here shows similar EF of 35-40% with moderate global hypokinesis and grade 1 diastolic dysfunction  Moderate to severe mitral regurgitation also noted  - decreased p o   Lasix to 20 mg daily  - cont metoprolol; no ACE/ARB due to CKD  - repeat BMP within 1 week of discharge  - patient to follow-up with his own cardiologist within 1-2 weeks of discharge    Hyponatremia  Assessment & Plan  - secondary to diuresis  - Lasix has been decreased to 20 mg daily  - repeat BMP in the morning  - consider nephrology consultation tomorrow if hyponatremia continues to worsen    Acute on chronic respiratory failure with hypoxia (HCC)  Assessment & Plan  - Secondary to decompensated heart failure  - now resolved with diuresis    Stage 3b chronic kidney disease ([facility] [address] )  Assessment & Plan  - renal function at baseline  - monitor closely with daily BMP while on IV diuretics    [facility]  Assessment & Plan  - Continue home dose keppra     Benign prostatic hyperplasia  Assessment & Plan  - Continue home dose flomax    CAD (coronary artery disease)  Assessment & Plan  - history of CABG in 2006 and abnormal stress test in 2020 medically managed    - no acute concerns from the standpoint at this point in time  - continue aspirin, statin, metoprolol       Hyperlipidemia  Assessment & Plan  - continue pravastatin      Discharging Physician / Practitioner: Mike Campos DO  PCP: Leny Dow DO  Admission Date: Admission Orders (From admission, onward)     Ordered        01/02/22 1628  Inpatient Admission  Once                      Discharge Date: 01/08/22    Consultations During Hospital Stay:  · Cardiology    Procedures Performed:   · None    Significant Findings / Test Results:   · CHF exacerbation    Incidental Findings:   · None     Test Results Pending at Discharge (will require follow up): · None     Outpatient Tests Requested:  Repeat BMP within 1 week of discharge to evaluate sodium while on Lasix    Complications:  None    Reason for Admission: CHF exacerbation    Hospital Course:   Jael Shaffer is a 80 y o  male patient who originally presented to the hospital on 1/2/2022 due to worsening dyspnea on exertion, lower extremity swelling, and fatigue  He was diagnosed with a CHF exacerbation with congestion on chest imaging, and elevated BNP  He required oxygen at the time of admission  He was admitted to the hospitalist service for further evaluation and management  He was started on IV diuretics and Cardiology was consulted  Echo showed EF of 35-40%  The patient's volume status gradually improved with diuresis  He was weaned off of oxygen and to room air  He was ambulating independently prior to discharge  Sodium was low from diuresis, and will need to be rechecked with a follow-up BMP within 1 week of discharge  Patient's family was instructed on obtaining these labs, and expressed understanding  He will be following up with his PCP and cardiologist within 1-2 weeks of discharge  Otherwise patient was feeling well and eager to return home  All questions and concerns were addressed  Please see above list of diagnoses and related plan for additional information  Condition at Discharge: good    Discharge Day Visit / Exam:   Subjective:  Patient seen and examined the day of discharge  Feeling well today  No new complaints  Denies any shortness of breath    Vitals: Blood Pressure: 117/94 (01/08/22 1503)  Pulse: 74 (01/08/22 0657)  Temperature: 98 3 °F (36 8 °C) (01/08/22 0657)  Temp Source: Oral (01/03/22 1500)  Respirations: 19 (01/08/22 0657)  Height: 5' 3" (160 cm) (01/03/22 1500)  Weight - Scale: 78 6 kg (173 lb 4 5 oz) (01/08/22 0600)  SpO2: 99 % (01/08/22 0657)  Exam:   PHYSICAL EXAM:    Vitals signs reviewed  Constitutional   Awake and cooperative  NAD  Head/Neck   Normocephalic  Atraumatic  HEENT   No scleral icterus  EOMI  Heart   Regular rate and rhythm  No murmers  Lungs   Clear to auscultation bilaterally  Respirations unlaboured  Abdomen   Soft  Nontender  Nondistended  Skin   Skin color normal  No rashes  Extremities   No deformities  No peripheral edema  Neuro   Alert and oriented  No new deficits  Psych   Mood stable  Affect normal          Discussion with Family: Updated  (daughter) via phone  Discharge instructions/Information to patient and family:   See after visit summary for information provided to patient and family  Provisions for Follow-Up Care:  See after visit summary for information related to follow-up care and any pertinent home health orders  Disposition:   Home    Planned Readmission: NO     Discharge Statement:  I spent 40 minutes discharging the patient  This time was spent on the day of discharge  I had direct contact with the patient on the day of discharge  Greater than 50% of the total time was spent examining patient, answering all patient questions, arranging and discussing plan of care with patient as well as directly providing post-discharge instructions  Additional time then spent on discharge activities  Discharge Medications:  See after visit summary for reconciled discharge medications provided to patient and/or family        **Please Note: This note may have been constructed using a voice recognition system**

## 2022-01-08 NOTE — DISCHARGE INSTR - AVS FIRST PAGE
Dear Ashli Salazar,     It was our pleasure to care for you here at [facility]  It is our hope that we were always able to exceed the expected standards for your care during your stay  You were hospitalized due to acute congestive heart failure  You were cared for on the 4th floor under the service of Nick Mac DO with the [facility] who covers for your primary care physician (PCP), Marlene Cuello DO, while you were hospitalized  If you have any questions or concerns related to this hospitalization, you may contact us at [phone]  For follow up as well as medication refills, we recommend that you follow up with your primary care physician  A registered nurse will reach out to you by phone within a few days after your discharge to answer any additional questions that you may have after going home  However, at this time we provide for you here, the most important instructions / recommendations at discharge:     Notable Medication Adjustments -   Start taking Lasix 20 mg daily  Testing Required after Discharge -   You will need to get blood work completed within a week of discharge  These results can be reviewed at your follow-up with your PCP and cardiologist   Important follow up information -     Please be sure to call and schedule follow-up visit with your cardiologist within 1-2 weeks of discharge  Other Instructions -     You will also need to make an appointment with your family doctor within 1-2 weeks of discharge  Please review this entire after visit summary as additional general instructions including medication list, appointments, activity, diet, any pertinent wound care, and other additional recommendations from your care team that may be provided for you        Sincerely,     Linda Moran DO

## 2022-01-10 ENCOUNTER — TRANSITIONAL CARE MANAGEMENT (OUTPATIENT)
Dept: INTERNAL MEDICINE CLINIC | Facility: CLINIC | Age: 87
End: 2022-01-10

## 2022-01-11 ENCOUNTER — TELEPHONE (OUTPATIENT)
Dept: INTERNAL MEDICINE CLINIC | Facility: CLINIC | Age: 87
End: 2022-01-11

## 2022-01-11 ENCOUNTER — PATIENT OUTREACH (OUTPATIENT)
Dept: CASE MANAGEMENT | Facility: HOSPITAL | Age: 87
End: 2022-01-11

## 2022-01-11 DIAGNOSIS — I50.21 ACUTE SYSTOLIC HEART FAILURE (HCC): Primary | ICD-10-CM

## 2022-01-11 NOTE — PROGRESS NOTES
Heart Failure Outpatient Care Coordinator Note: IB referral received and chart review completed  Left detailed message  Will attempt to again this week

## 2022-01-11 NOTE — TELEPHONE ENCOUNTER
Katy from [place] at home with update on pt  Visited today / Started pt on [facility] program 1x week to monitor fluid/ and Telemedicine in attempts to keep pt out of hospital  Weight today is 156  Crackles base of lungs  Dry Cough  No appetite - Katy explained that he needs to eat something even if its a bland diet  Visiting nurse will be in to draw BMP on Thursday and update dr  At that time      Katy # [phone]

## 2022-01-12 ENCOUNTER — PATIENT OUTREACH (OUTPATIENT)
Dept: CASE MANAGEMENT | Facility: HOSPITAL | Age: 87
End: 2022-01-12

## 2022-01-12 ENCOUNTER — TELEPHONE (OUTPATIENT)
Dept: INTERNAL MEDICINE CLINIC | Facility: CLINIC | Age: 87
End: 2022-01-12

## 2022-01-12 ENCOUNTER — TELEPHONE (OUTPATIENT)
Dept: CARDIOLOGY CLINIC | Facility: CLINIC | Age: 87
End: 2022-01-12

## 2022-01-12 NOTE — TELEPHONE ENCOUNTER
Jeny Grant [place]   Ph / [phone]    FYI for Dr Pedro Collier Pt has left foot suspected pressure injury, D T I , she will do skin prep 2 x's a day    and monitor     he also has stage 1 pressure injury on left great toe

## 2022-01-12 NOTE — PROGRESS NOTES
Heart Failure Outpatient Care Coordinator Note: Patient's daughter, Arline Davila returned call  States patient is doing ok at home, but fatigued  He is eating and drinking ok, but typically appetite is not great"  She is his caregiver and does manage his day to day needs- medications, meals etc  Patient does have a private pay aid M-F 9 am-2 pm  Arlinejosh Weiones is aware of daily care for HF- avoiding high sodium foods and daily weights  I did review symptoms that would alert her to call the cardiology office  [address] and PT have started home visits  Arline Davila is requesting patient establish care with [facility] at [place] as is too far to continue bringing patient to his cardiologist in [place]  Call made to office requesting they call patient  Will continue to follow throughout bundle episode

## 2022-01-12 NOTE — TELEPHONE ENCOUNTER
Left message for daughter Berta Neighbours to contact office to schedule [facility] f/u appt w/Dr Cameron Marrero  Patient's daughter Berta Neighbours requested to have patient establish in [place]  Instead of taking him back to [address] [place] [address]

## 2022-01-12 NOTE — TELEPHONE ENCOUNTER
Doreen from [address] at home   Patient was discharge yesterday from [facility]   Leena العراقي  wants to know if its okay to do the BMP on Friday 1/14/2022

## 2022-01-17 ENCOUNTER — TELEMEDICINE (OUTPATIENT)
Dept: CARDIOLOGY CLINIC | Facility: CLINIC | Age: 87
End: 2022-01-17
Payer: MEDICARE

## 2022-01-17 DIAGNOSIS — N18.32 STAGE 3B CHRONIC KIDNEY DISEASE (HCC): ICD-10-CM

## 2022-01-17 DIAGNOSIS — E78.2 MIXED HYPERLIPIDEMIA: ICD-10-CM

## 2022-01-17 DIAGNOSIS — I34.0 NONRHEUMATIC MITRAL VALVE REGURGITATION: ICD-10-CM

## 2022-01-17 DIAGNOSIS — I50.22 CHRONIC SYSTOLIC HEART FAILURE (HCC): Primary | ICD-10-CM

## 2022-01-17 DIAGNOSIS — I25.10 CORONARY ARTERY DISEASE INVOLVING NATIVE CORONARY ARTERY OF NATIVE HEART WITHOUT ANGINA PECTORIS: ICD-10-CM

## 2022-01-17 DIAGNOSIS — I42.0 DILATED CARDIOMYOPATHY (HCC): ICD-10-CM

## 2022-01-17 DIAGNOSIS — I10 PRIMARY HYPERTENSION: ICD-10-CM

## 2022-01-17 PROBLEM — I42.9 CARDIOMYOPATHY (HCC): Status: ACTIVE | Noted: 2022-01-17

## 2022-01-17 PROCEDURE — 99214 OFFICE O/P EST MOD 30 MIN: CPT | Performed by: PHYSICIAN ASSISTANT

## 2022-01-17 NOTE — PROGRESS NOTES
Virtual Regular Visit    Verification of patient location:    Patient is located in the following state in which I hold an active license [place]    Assessment:    Problem List Items Addressed This Visit        Cardiovascular and Mediastinum    Hypertension    CAD (coronary artery disease)    Chronic systolic heart failure ([facility] ) - Primary    Mitral regurgitation    Cardiomyopathy ([facility] )       Genitourinary    Stage 3b chronic kidney disease ([facility] )       Other    Hyperlipidemia           Reason for visit is No chief complaint on file  Encounter provider Shira Jane PA-C    Provider located at [address] [place] [id number]      Recent Visits  Date Type Provider Dept   01/11/22 Telephone [place] Nguyen recent visits within past 7 days and meeting all other requirements  Today's Visits  Date Type Provider Dept   01/17/22 [facility]medicine [facility]   Showing today's visits and meeting all other requirements  Future Appointments  No visits were found meeting these conditions  Showing future appointments within next 150 days and meeting all other requirements       The patient was identified by name and date of birth  Jeronimo Christianson was informed that this is a telemedicine visit and that the visit is being conducted through [facility] and patient was informed that this is not a secure, HIPAA-compliant platform  He agrees to proceed  My office door was closed  No one else was in the room  He acknowledged consent and understanding of privacy and security of the video platform  The patient has agreed to participate and understands they can discontinue the visit at any time  Patient is aware this is a billable service  Plan:   1  Chronic systolic CHF - Weight today 159  He reports on intended weight gain due to better appetite   He denies any shortness of breath, orthopnea, PND or LE edema  Healthy dietary choices were discussed today  Recommended avoiding excessive salt or fluid intake  Report any CHF symptoms immediately  Continue PO Lasix 20mg daily  2  Cardiomyopathy, EF 35-40% - On Toprol XL 25mg daily  He is not on ACEi/ARB due to CKD at baseline  He does not qualify for ICD at this time  3  CAD s/p CABG (2006) - Denies any chest discomfort  Unclear how many bypass' he has  He will obtain his prior records for our review  He is on ASA 325mg at this time  Patient reports having a TIA last year and was recommended for full ASA  Continue Zocor 40mg daily  4  Moderate-severe MR - as noted on inpatient TTE from 1/3/2022  Will continue to monitor  He is not interested in surgical options at this time  5  Hypertension - BP stable (average 120/60) on Norvasc 5mg daily, Toprol XL 25mg daily an Lasix 20mg daily  Continue BP readings at home and report any persistently elevated BPs of >140/90  Advised on a cardiac diet and exercise as tolerated  6  Hyperlipidemia - denies myalgias with Zocor  7  CKD3    RTO in 1-2 months or sooner if needed  Subjective:  Vanessa Madden is a 80 y o  male with history of chronic systolic CHF, cardiomyopathy, EF 35-40%, moderate- severe MR, hypertension, hyperlipidemia, carotid artery stenosis, seizure, anemia, dementia, CKD who presents for hospital follow up visit  Patient presented 1/2/22 with increasing dyspnea on exertion, LE edema and fatigue  He had evidence of acute CHF with elevated BNP and CXR with pulmonary vascular congestion  His inpatient TTE showed EF 35-40% with moderate to severe MR  He declines any further cardiac workup  His IV diuretics were switched to PO Lasix 20mg on discharge on 1/8/2022  Since discharge, he reports he is feeling well without any complaints today  He reports his appetite is much better now and has had a +3lb weight gain since then   They are watching his salt and fluid intake  He denies any shortness of breath, orthopnea (1 1/2 pillow sleeper at baseline), PND or LE edema  He is more active at home and is performing his ADLS without issue  He is scheduled for PT/OT to come to his home this week  His BP average in the 120s/60s  He is taking his medications at home without issue  Patient will be establishing with this office from his primary cardiologist in [place]  He is working on getting all his documents to our office  Diagnostics:  TTE 1/3/2022:    Left Ventricle: Left ventricular cavity size is normal  Wall thickness is mildly increased  Systolic function is severely reduced  Estimated LVEF 35-40%(recommend MUGA scan for accurate LVEF) There is moderate global hypokinesis with regional variation - mid anteroseptum, apical wall appears severely hypokinetic There is mild concentric hypertrophy  Diastolic function is mildly abnormal, consistent with grade I (abnormal) relaxation    IVS: There is abnormal septal motion consistent with left bundle branch block    Left Atrium: The atrium is mildly dilated    Aortic Valve: There is mild regurgitation    Mitral Valve: There is moderate thickening  There is moderate annular calcification  There is moderate to severe regurgitation    Tricuspid Valve: There is mild regurgitation      Past Medical History:   Diagnosis Date    Arthritis     CHF (congestive heart failure) (HCC)     Coronary artery disease     Esophageal reflux     History of transfusion     "MANY YEARS AGO"    Hyperlipidemia     Hypertension     Renal disorder     PT DENIES, BUT ON PROBLEM LIST    Stroke ([facility] )     NOT SURE       Past Surgical History:   Procedure Laterality Date    ABDOMINAL SURGERY      BACK SURGERY      CARDIAC SURGERY      COLON SURGERY      CORONARY ARTERY BYPASS GRAFT      FRACTURE SURGERY      TONSILLECTOMY      Last Assessed: 12/5/2016       Current Outpatient Medications   Medication Sig Dispense Refill    amLODIPine (NORVASC) 5 mg tablet Take 1 tablet (5 mg total) by mouth daily 90 tablet 3    aspirin (ECOTRIN) 325 mg EC tablet Take 1 tablet by mouth daily      cyanocobalamin (VITAMIN B-12) 500 MCG tablet Take 1 tablet (500 mcg total) by mouth daily 90 tablet 3    ergocalciferol (VITAMIN D2) 50,000 units TAKE 1 CAPSULE BY MOUTH ONE TIME PER WEEK 12 capsule 1    furosemide (LASIX) 20 mg tablet Take 1 tablet (20 mg total) by mouth daily 30 tablet 0    levETIRAcetam (KEPPRA) 250 mg tablet TAKE 1 TABLET TWICE A  tablet 3    pantoprazole (PROTONIX) 40 mg tablet Take 40 mg by mouth daily      Polyethylene Glycol 3350 (MIRALAX PO) Take by mouth as needed        simvastatin (ZOCOR) 40 mg tablet Take 40 mg by mouth daily      tamsulosin (FLOMAX) 0 4 mg Take 0 4 mg by mouth daily      thiamine 100 MG tablet TAKE 1 TABLET BY MOUTH EVERY DAY 90 tablet 3    TOPROL XL 25 MG 24 hr tablet Take 25 mg by mouth daily        No current facility-administered medications for this visit  Allergies   Allergen Reactions    Penicillamine Rash    Penicillins Rash     Rash on hands    Morphine Other (See Comments)     Low blood pressure     Review of Systems   Constitutional: Negative for appetite change, chills, diaphoresis, fatigue and fever  Respiratory: Negative for cough, chest tightness and shortness of breath  Cardiovascular: Negative for chest pain, palpitations and leg swelling  Gastrointestinal: Negative for diarrhea, nausea and vomiting  Endocrine: Negative for cold intolerance and heat intolerance  Genitourinary: Negative for difficulty urinating, dysuria and enuresis  Musculoskeletal: Negative for arthralgias, back pain and gait problem  Allergic/Immunologic: Negative for environmental allergies and food allergies  Neurological: Negative for dizziness, facial asymmetry and headaches  Hematological: Negative for adenopathy  Does not bruise/bleed easily     Psychiatric/Behavioral: Negative for agitation, behavioral problems and confusion  Video Exam    There were no vitals filed for this visit  Physical Exam  Constitutional:       General: He is not in acute distress  Appearance: Normal appearance  He is normal weight  He is not ill-appearing, toxic-appearing or diaphoretic  HENT:      Head: Normocephalic and atraumatic  Right Ear: External ear normal       Left Ear: External ear normal       Nose: Nose normal       Mouth/Throat:      Mouth: Mucous membranes are moist       Pharynx: Oropharynx is clear  Eyes:      Extraocular Movements: Extraocular movements intact  Conjunctiva/sclera: Conjunctivae normal       Pupils: Pupils are equal, round, and reactive to light  Cardiovascular:      Rate and Rhythm: Normal rate  Pulmonary:      Effort: Pulmonary effort is normal    Musculoskeletal:         General: Normal range of motion  Cervical back: Normal range of motion and neck supple  Skin:     General: Skin is warm and dry  Neurological:      General: No focal deficit present  Mental Status: He is alert and oriented to person, place, and time  Psychiatric:         Mood and Affect: Mood normal          Behavior: Behavior normal          Thought Content: Thought content normal          Judgment: Judgment normal        I spent 30 minutes directly with the patient during this visit    [address] [place] [address] verbally agrees to participate in [employer]  Pt is aware that [employer] could be limited without vital signs or the ability to perform a full hands-on physical exam  Arjun Huff understands he or the provider may request at any time to terminate the video visit and request the patient to seek care or treatment in person

## 2022-01-19 ENCOUNTER — PATIENT OUTREACH (OUTPATIENT)
Dept: CASE MANAGEMENT | Facility: HOSPITAL | Age: 87
End: 2022-01-19

## 2022-01-19 NOTE — PROGRESS NOTES
Heart Failure Outpatient Care Coordinator Note: Chart notes reviewed and call made to patient's daughter, Angel lee  She reports patient is doing "well and getting a little stronger everyday"  She states he was able to go downstairs to basement where he enjoys being  His weight is stable- gained a few pounds, but seems to be real weight, not water weight  He was 158# today and denies edema or SOB  Benny MONTANA RN and PT still coming  [place] reports received HF Zone tool and diet information and "was very helpful"  No needs or concerns  Will continue to follow throughout Bundle episode

## 2022-01-21 ENCOUNTER — OFFICE VISIT (OUTPATIENT)
Dept: INTERNAL MEDICINE CLINIC | Facility: CLINIC | Age: 87
End: 2022-01-21
Payer: MEDICARE

## 2022-01-21 VITALS
OXYGEN SATURATION: 99 % | HEART RATE: 80 BPM | HEIGHT: 63 IN | WEIGHT: 163.4 LBS | SYSTOLIC BLOOD PRESSURE: 110 MMHG | TEMPERATURE: 97.3 F | BODY MASS INDEX: 28.95 KG/M2 | DIASTOLIC BLOOD PRESSURE: 68 MMHG

## 2022-01-21 DIAGNOSIS — I10 PRIMARY HYPERTENSION: ICD-10-CM

## 2022-01-21 DIAGNOSIS — I50.9 CHF EXACERBATION (HCC): ICD-10-CM

## 2022-01-21 DIAGNOSIS — E55.9 VITAMIN D DEFICIENCY: Primary | ICD-10-CM

## 2022-01-21 PROCEDURE — 99495 TRANSJ CARE MGMT MOD F2F 14D: CPT

## 2022-01-21 RX ORDER — FUROSEMIDE 20 MG/1
20 TABLET ORAL DAILY
Qty: 90 TABLET | Refills: 1 | Status: SHIPPED | OUTPATIENT
Start: 2022-01-21 | End: 2022-07-25

## 2022-01-21 NOTE — PATIENT INSTRUCTIONS

## 2022-01-25 NOTE — PROGRESS NOTES
INTERNAL MEDICINE TRANSITION OF CARE OFFICE VISIT  [facility] - [facility] [address] [place] [address]    NAME: Gómez Beltran  AGE: 80 y o  SEX: male  : 1927     DATE: 2022     Assessment and Plan:     Diagnoses and all orders for this visit:    Vitamin D deficiency  -     Vitamin D 25 hydroxy; Future    CHF exacerbation (HCC)  -     furosemide (LASIX) 20 mg tablet; Take 1 tablet (20 mg total) by mouth daily  -     CBC and differential; Future  -     Comprehensive metabolic panel; Future  -     Lipid panel; Future    Primary hypertension  -     CBC and differential; Future  -     Comprehensive metabolic panel; Future  -     Lipid panel; Future         Transitional Care Management Review:     Gómez Beltran is a 80 y o  male here for TCM follow-up    During the TCM phone call patient stated:    TCM Call (since 2021)     Date and time call was made  1/10/2022  1:08 PM    Hospital care reviewed  Records reviewed        Patient [facility]ial[facility]        Date of Admission  22    Date of discharge  22    Diagnosis  SOB    Disposition  Home    Were the patients medications reviewed and updated  Yes    Current Symptoms  Weakness    Weakness severity  Moderate      TCM Call (since 2021)     Post hospital issues  Reduced activity    Should patient be enrolled in anticoag monitoring? No    Scheduled for follow up?   Yes    Did you obtain your prescribed medications  Yes    Do you need help managing your prescriptions or medications  No    Is transportation to your appointment needed  No    I have advised the patient to call PCP with any new or worsening symptoms  [email or web address]    Living Arrangements  [address]    Do you have social support  Yes, as much as I need    Are you recieving any outpatient services  No    Are you recieving home care services  Yes    Types of home care services  Nurse visit    Are you using any community resources  No    Current waiver services  No    Have you fallen in the last 12 months  No    Interperter language line needed  No    Counseling  Family    Counseling topics  patient and family education           HPI:     Krystal Kidd is a pleasant 75-year-old male who presents to the office today with his daughter for a TCM visit  He was admitted on January 2, 2022 to [facility] after presenting with dyspnea on exertion lower extremity swelling and fatigue  Diagnosed with CHF exacerbation with interstitial edema on x-ray and elevated BNP  He required oxygen during his admission, was placed on IV diuretics, cardiology was consult, echo shows an EF of 35-40 percent  As he was diuresed he was weaned off oxygen  He was discharged home on January 8, 2022  He had a follow-up with Cardiology on January 17th  He is to continue his p o  Lasix 20 milligrams daily  He is also on Toprol XL 25 milligrams daily for cardiomyopathy  He is monitoring his weights at home his blood pressure at home  He is watching his sodium intake  He denies shortness of breath, orthopnea, lower extremity edema, or PND  His appetite has been good since he has been home and he has been performing his ADLs without any issues  His only concern was a rash on his back which is now improved  He and his daughter state he dresses in layers and had been over bundled one night in which he was sweating  They believe that he into that with a rash from this  He states that he has been wearing less layers to bed and the rash resolved  He has no complaints or concerns at this time  He has follow-up appointments with Cardiology as well as his PCP  I have placed lab that he can get prior to his next visit with Dr Angelica Valentine        The following portions of the patient's history were reviewed and updated as appropriate: allergies, current medications, past family history, past medical history, past social history, past surgical history and problem list      Review of Systems:     Review of Systems   Constitutional: Negative for chills, fever and unexpected weight change  HENT: Negative for congestion and sore throat  Eyes: Negative for pain and visual disturbance  Respiratory: Negative for cough, shortness of breath and wheezing  Cardiovascular: Negative for chest pain, palpitations and leg swelling  Gastrointestinal: Negative for abdominal pain, diarrhea, nausea and vomiting  Genitourinary: Negative for dysuria and hematuria  Musculoskeletal: Negative for arthralgias, back pain and myalgias  Skin: Positive for rash (On back)  Negative for color change  Neurological: Negative for seizures and syncope  Psychiatric/Behavioral: Negative for sleep disturbance  All other systems reviewed and are negative  Problem List:     Patient Active Problem List   Diagnosis    Hypertension    Hyperlipidemia    CAD (coronary artery disease)    Benign prostatic hyperplasia    Actinic keratosis    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, or chronic kidney disease (HCC)    Seizure (HCC)    Acute gout involving toe of left foot    Stage 3b chronic kidney disease (HCC)    Chronic systolic heart failure (HCC)    Acute on chronic respiratory failure with hypoxia (HCC)    Hyponatremia    Mitral regurgitation    Cardiomyopathy ([facility] )        Objective:     /68   Pulse 80   Temp (!) 97 3 °F (36 3 °C)   Ht 5' 3" (1 6 m)   Wt 74 1 kg (163 lb 6 4 oz)   SpO2 99%   BMI 28 95 kg/m²     Physical Exam  Vitals and nursing note reviewed  Constitutional:       General: He is not in acute distress  Appearance: He is well-developed and normal weight  HENT:      Head: Normocephalic and atraumatic  Nose: Nose normal       Mouth/Throat:      Mouth: Mucous membranes are moist       Pharynx: Oropharynx is clear     Eyes:      Conjunctiva/sclera: Conjunctivae normal    Cardiovascular:      Rate and Rhythm: Normal rate and regular rhythm  Pulses: Normal pulses  Heart sounds: Normal heart sounds  No murmur heard  Pulmonary:      Effort: Pulmonary effort is normal  No respiratory distress  Breath sounds: Rales (Right base) present  Abdominal:      Palpations: Abdomen is soft  Tenderness: There is no abdominal tenderness  Musculoskeletal:         General: Normal range of motion  Cervical back: Normal range of motion and neck supple  Right lower leg: No edema  Left lower leg: No edema  Lymphadenopathy:      Cervical: No cervical adenopathy  Skin:     General: Skin is warm and dry  Capillary Refill: Capillary refill takes less than 2 seconds  Neurological:      Mental Status: He is alert and oriented to person, place, and time  Psychiatric:         Mood and Affect: Mood normal          Behavior: Behavior normal          Thought Content: Thought content normal          Judgment: Judgment normal           Laboratory Results: I have personally reviewed the pertinent laboratory results/reports     Radiology/Other Diagnostic Testing Results: I have personally reviewed pertinent reports  XR chest 1 view portable    Result Date: 1/2/2022  CHEST INDICATION:   Chest pain  COMPARISON:  None EXAM PERFORMED/VIEWS:  XR CHEST PORTABLE FINDINGS:  There are median sternotomy wires indicating prior cardiac surgery  There is mild enlargement of the cardiac silhouette Small bilateral pleural effusions are present  Pulmonary vasculature and interstitial markings are prominent  Osseous structures appear within normal limits for patient age       Findings are most suggestive of interstitial edema with small pleural effusions/CHF Workstation performed: [id number]     CTA ED chest PE study    Result Date: 1/2/2022  CTA - CHEST WITH IV CONTRAST - PULMONARY ANGIOGRAM INDICATION:   Chest pain for 1 week, shortness of breath with 90% oxygenation on room air  The patient tested negative for Covid-19 today  COMPARISON: Chest radiograph earlier today TECHNIQUE: CTA examination of the chest was performed using angiographic technique according to a protocol specifically tailored to evaluate for pulmonary embolism  Axial, sagittal, and coronal 2D reformatted images were created from the source data and  submitted for interpretation  In addition, coronal 3D MIP postprocessing was performed on the acquisition scanner  Radiation dose length product (DLP) for this visit:  446 mGy-cm  This examination, like all CT scans performed in the [facility], was performed utilizing techniques to minimize radiation dose exposure, including the use of iterative reconstruction and automated exposure control  IV Contrast:  85 mL of iohexol (OMNIPAQUE)  FINDINGS: PULMONARY ARTERIAL TREE:  No pulmonary embolus is seen to the segmental level  Limited evaluation of the subsegmental vessels  LUNGS:  There is central pulmonary vascular congestion with mild interstitial reticular thickening suggestive of CHF  There are bilateral groundglass opacities and patchy consolidative changes throughout both lungs  Although this could also potentially  be related to pulmonary edema, there is a slightly more peripheral than central distribution of these changes seen in the upper lobes without a clear perihilar predominance as would typically be seen with pulmonary edema  Possibility of superimposed pneumonia including viral illness cannot be completely excluded  Mild bibasilar compressive atelectasis from pleural effusions  Small apical blebs in keeping with mild emphysema  No central endobronchial lesions  PLEURA:  Small to modest sized bilateral pleural effusions  HEART/GREAT VESSELS:  The heart is borderline enlarged with multichamber enlargement  CABG  Atherosclerotic changes thoracic aorta and native coronary arteries    No evidence of thoracic aortic aneurysm  MEDIASTINUM AND SHABANA:  A few shotty mediastinal lymph nodes noted  Small sliding hiatal hernia suspected  The esophagus is otherwise unremarkable  CHEST WALL AND LOWER NECK:   Status post median sternotomy  VISUALIZED STRUCTURES IN THE UPPER ABDOMEN:  Colonic diverticulosis seen about the splenic flexure  Small exophytic upper pole left renal cyst   Pancreatic atrophy  OSSEOUS STRUCTURES:  No acute fracture or destructive osseous lesion  Degenerative changes of the spine  Chronic, mild T7 superior endplate compression deformity  1  No evidence of pulmonary embolus to the segmental level  2  Borderline cardiomegaly with central vascular congestion, interstitial thickening and bilateral pleural effusions consistent with CHF  Bilateral groundglass opacities and patchy consolidative changes throughout both lungs could all potentially be attributed to pulmonary edema, however, there is a slightly more peripheral than central distribution of these changes seen in the upper lobes without a clear perihilar predominance as would typically be seen with pulmonary edema  Possibility of superimposed pneumonia including viral illness cannot be completely excluded  3  Mild COPD  Additional incidental findings as above  Workstation performed: [id number]     Echo complete w/ contrast if indicated    Result Date: 1/3/2022    Left Ventricle: Left ventricular cavity size is normal  Wall thickness is mildly increased  Systolic function is severely reduced  Estimated LVEF 35-40%(recommend MUGA scan for accurate LVEF) There is moderate global hypokinesis with regional variation - mid anteroseptum, apical wall appears severely hypokinetic There is mild concentric hypertrophy  Diastolic function is mildly abnormal, consistent with grade I (abnormal) relaxation    IVS: There is abnormal septal motion consistent with left bundle branch block    Left Atrium: The atrium is mildly dilated    Aortic Valve:  There is mild regurgitation    Mitral Valve: There is moderate thickening  There is moderate annular calcification  There is moderate to severe regurgitation    Tricuspid Valve: There is mild regurgitation  Current Medications:     Outpatient Medications Prior to Visit   Medication Sig Dispense Refill    amLODIPine (NORVASC) 5 mg tablet Take 1 tablet (5 mg total) by mouth daily 90 tablet 3    aspirin (ECOTRIN) 325 mg EC tablet Take 1 tablet by mouth daily      cyanocobalamin (VITAMIN B-12) 500 MCG tablet Take 1 tablet (500 mcg total) by mouth daily 90 tablet 3    ergocalciferol (VITAMIN D2) 50,000 units TAKE 1 CAPSULE BY MOUTH ONE TIME PER WEEK 12 capsule 1    levETIRAcetam (KEPPRA) 250 mg tablet TAKE 1 TABLET TWICE A  tablet 3    pantoprazole (PROTONIX) 40 mg tablet Take 40 mg by mouth daily      Polyethylene Glycol 3350 (MIRALAX PO) Take by mouth as needed        simvastatin (ZOCOR) 40 mg tablet Take 40 mg by mouth daily      tamsulosin (FLOMAX) 0 4 mg Take 0 4 mg by mouth daily      TOPROL XL 25 MG 24 hr tablet Take 25 mg by mouth daily       furosemide (LASIX) 20 mg tablet Take 1 tablet (20 mg total) by mouth daily 30 tablet 0    thiamine 100 MG tablet TAKE 1 TABLET BY MOUTH EVERY DAY 90 tablet 3     No facility-administered medications prior to visit  I spent 30 minutes with this patient      [address] [place] [address]  [facility]

## 2022-01-27 ENCOUNTER — TELEPHONE (OUTPATIENT)
Dept: CARDIOLOGY CLINIC | Facility: CLINIC | Age: 87
End: 2022-01-27

## 2022-01-27 NOTE — TELEPHONE ENCOUNTER
Tried to call number on file and advised that we received pt's medical information release form. Medical Information Release form will be scanned in chart & faxed over to other office.

## 2022-03-03 ENCOUNTER — PATIENT OUTREACH (OUTPATIENT)
Dept: CASE MANAGEMENT | Facility: HOSPITAL | Age: 87
End: 2022-03-03

## 2022-03-03 NOTE — PROGRESS NOTES
Heart Failure Outpatient Care Coordinator Note: Chart notes reviewed and call made to patient's daughter, Wili Sheets and left message

## 2022-03-07 ENCOUNTER — OFFICE VISIT (OUTPATIENT)
Dept: INTERNAL MEDICINE CLINIC | Facility: CLINIC | Age: 87
End: 2022-03-07
Payer: MEDICARE

## 2022-03-07 VITALS
TEMPERATURE: 97.4 F | OXYGEN SATURATION: 95 % | BODY MASS INDEX: 29.66 KG/M2 | RESPIRATION RATE: 16 BRPM | HEART RATE: 71 BPM | SYSTOLIC BLOOD PRESSURE: 120 MMHG | DIASTOLIC BLOOD PRESSURE: 62 MMHG | HEIGHT: 63 IN | WEIGHT: 167.4 LBS

## 2022-03-07 DIAGNOSIS — R05.9 COUGH: Primary | ICD-10-CM

## 2022-03-07 PROCEDURE — 99214 OFFICE O/P EST MOD 30 MIN: CPT | Performed by: NURSE PRACTITIONER

## 2022-03-07 RX ORDER — BENZONATATE 100 MG/1
100 CAPSULE ORAL 3 TIMES DAILY PRN
Qty: 30 CAPSULE | Refills: 0 | Status: SHIPPED | OUTPATIENT
Start: 2022-03-07 | End: 2022-05-31 | Stop reason: CLARIF

## 2022-03-07 NOTE — PROGRESS NOTES
[place]    NAME: Gómez Beltran  AGE: 80 y o  SEX: male  : 1927     DATE: 3/7/2022     Assessment and Plan:     Problem List Items Addressed This Visit        Other    Cough - Primary     Patient with a 3 month history of a lingering cough  The patient was hospitalized in January for congestive heart failure  Since that time he has continued with a cough  It is productive at times for clear sputum  He denies any fevers  He has no shortness of breath  He has been performing daily weights  He does not have any lower extremity swelling  He has been using Mucinex without resolution of the cough  I did recommend to the patient that he try Zyrtec or Claritin over the counter  In addition Flonase nasal spray was recommended  Cool mist humidifier was recommended at nighttime  [facility] were sent to the patient's pharmacy as he was taking this in the hospital with relief  I will contact the patient with the results of the chest x-ray that I ordered  Relevant Medications    benzonatate (TESSALON PERLES) 100 mg capsule    Other Relevant Orders    XR chest pa & lateral              No follow-ups on file  Chief Complaint:     Chief Complaint   Patient presents with    Cough     same for about 3 months, not worsening  History of Present Illness:   Patient presents to the office today for 3 month history of a lingering cough  It is sometimes positive for clear sputum  He was hospitalized in January and the cough lingered after that hospitalization for CHF  Chest x-ray has been ordered  I will treat this as an allergic rhinitis and Flonase was recommended along with Zyrtec or Claritin  [facility] were sent to the patient's pharmacy  I will contact him with the results of his chest x-ray  Review of Systems:     Review of Systems   Constitutional: Negative  Negative for fatigue  HENT: Negative    Negative for congestion, postnasal drip, rhinorrhea and trouble swallowing  Eyes: Negative  Negative for visual disturbance  Respiratory: Positive for cough  Negative for choking and shortness of breath  Cardiovascular: Negative  Negative for chest pain  Gastrointestinal: Negative  Endocrine: Negative  Genitourinary: Negative  Musculoskeletal: Negative  Negative for arthralgias, back pain, myalgias and neck pain  Skin: Negative  Neurological: Negative for dizziness and headaches  Psychiatric/Behavioral: Negative  Problem List:     Patient Active Problem List   Diagnosis    Hypertension    Hyperlipidemia    CAD (coronary artery disease)    Benign prostatic hyperplasia    Actinic keratosis    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, or chronic kidney disease (HCC)    Seizure (HCC)    Acute gout involving toe of left foot    Stage 3b chronic kidney disease (HCC)    Chronic systolic heart failure (HCC)    Acute on chronic respiratory failure with hypoxia (HCC)    Hyponatremia    Mitral regurgitation    Cardiomyopathy ([facility] [address] )        Objective:     /62 (BP Location: Left arm, Patient Position: Sitting, Cuff Size: Standard)   Pulse 71   Temp (!) 97 4 °F (36 3 °C) (Tympanic)   Resp 16   Ht 5' 3" (1 6 m)   Wt 75 9 kg (167 lb 6 4 oz)   SpO2 95%   BMI 29 65 kg/m²     Physical Exam  Vitals reviewed  Constitutional:       Appearance: Normal appearance  HENT:      Head: Normocephalic and atraumatic  Nose: Nose normal       Mouth/Throat:      Mouth: Mucous membranes are moist    Eyes:      Extraocular Movements: Extraocular movements intact  Pupils: Pupils are equal, round, and reactive to light  Cardiovascular:      Rate and Rhythm: Normal rate and regular rhythm  Pulses: Normal pulses  Heart sounds: Normal heart sounds     Pulmonary:      Effort: Pulmonary effort is normal  No respiratory distress  Breath sounds: No stridor  Examination of the right-lower field reveals decreased breath sounds  Examination of the left-lower field reveals decreased breath sounds  Decreased breath sounds present  No wheezing, rhonchi or rales  Chest:      Chest wall: No tenderness  Musculoskeletal:         General: Normal range of motion  Skin:     General: Skin is warm  Neurological:      General: No focal deficit present  Mental Status: He is alert and oriented to person, place, and time  Psychiatric:         Mood and Affect: Mood normal          Behavior: Behavior normal          Thought Content:  Thought content normal          Judgment: Judgment normal          [address]

## 2022-03-07 NOTE — ASSESSMENT & PLAN NOTE
Patient with a 3 month history of a lingering cough  The patient was hospitalized in January for congestive heart failure  Since that time he has continued with a cough  It is productive at times for clear sputum  He denies any fevers  He has no shortness of breath  He has been performing daily weights  He does not have any lower extremity swelling  He has been using Mucinex without resolution of the cough  I did recommend to the patient that he try Zyrtec or Claritin over the counter  In addition Flonase nasal spray was recommended  Cool mist humidifier was recommended at nighttime  [facility] were sent to the patient's pharmacy as he was taking this in the hospital with relief  I will contact the patient with the results of the chest x-ray that I ordered

## 2022-03-08 ENCOUNTER — APPOINTMENT (OUTPATIENT)
Dept: RADIOLOGY | Facility: CLINIC | Age: 87
End: 2022-03-08
Payer: MEDICARE

## 2022-03-08 DIAGNOSIS — R05.9 COUGH: ICD-10-CM

## 2022-03-08 PROCEDURE — 71046 X-RAY EXAM CHEST 2 VIEWS: CPT

## 2022-03-11 ENCOUNTER — TELEPHONE (OUTPATIENT)
Dept: INTERNAL MEDICINE CLINIC | Facility: CLINIC | Age: 87
End: 2022-03-11

## 2022-03-11 DIAGNOSIS — R05.3 CHRONIC COUGH: Primary | ICD-10-CM

## 2022-03-11 NOTE — TELEPHONE ENCOUNTER
Please let the daughter know that since this cough is ongoing I believe he should be evaluated by a pulmonologist   I have entered a referral   If the [facility] are helping him I can send a refill to the pharmacy

## 2022-03-11 NOTE — TELEPHONE ENCOUNTER
Saw elidia on Monday,   Daughter Joey Factor called, cough is no better and wants xray results     # [phone]

## 2022-03-11 NOTE — TELEPHONE ENCOUNTER
----- Message from Marcia Fernandez sent at 3/11/2022  9:23 AM EST -----    Let the patient know his chest x-ray is normal

## 2022-03-11 NOTE — TELEPHONE ENCOUNTER
----- Message from Yamilet Kraft, [address] sent at 3/11/2022  9:23 AM EST -----    Let the patient know his chest x-ray is normal

## 2022-03-11 NOTE — TELEPHONE ENCOUNTER
SPOKE TO DAUGHTER AND WAS NOTIFIED AND GIVEN NUMBER TO PULMONOLOGY AND IS WONDERING WHAT THEY CAN DO FOR COUGH TILL SEEN BY PULMONOLOGY AS THE PERLES DO NOT WORK

## 2022-03-21 ENCOUNTER — OFFICE VISIT (OUTPATIENT)
Dept: CARDIOLOGY CLINIC | Facility: CLINIC | Age: 87
End: 2022-03-21
Payer: MEDICARE

## 2022-03-21 VITALS
OXYGEN SATURATION: 91 % | BODY MASS INDEX: 28.7 KG/M2 | RESPIRATION RATE: 16 BRPM | SYSTOLIC BLOOD PRESSURE: 124 MMHG | HEART RATE: 63 BPM | DIASTOLIC BLOOD PRESSURE: 76 MMHG | HEIGHT: 63 IN | WEIGHT: 162 LBS

## 2022-03-21 DIAGNOSIS — I25.10 ATHEROSCLEROSIS OF NATIVE CORONARY ARTERY OF NATIVE HEART WITHOUT ANGINA PECTORIS: ICD-10-CM

## 2022-03-21 DIAGNOSIS — I34.0 MITRAL VALVE INSUFFICIENCY, UNSPECIFIED ETIOLOGY: ICD-10-CM

## 2022-03-21 DIAGNOSIS — E78.2 MIXED HYPERLIPIDEMIA: ICD-10-CM

## 2022-03-21 DIAGNOSIS — I50.22 CHRONIC SYSTOLIC HEART FAILURE (HCC): Primary | ICD-10-CM

## 2022-03-21 DIAGNOSIS — I10 PRIMARY HYPERTENSION: ICD-10-CM

## 2022-03-21 PROCEDURE — 99214 OFFICE O/P EST MOD 30 MIN: CPT | Performed by: INTERNAL MEDICINE

## 2022-03-21 RX ORDER — FEXOFENADINE HYDROCHLORIDE 60 MG/1
60 TABLET, FILM COATED ORAL DAILY
COMMUNITY

## 2022-03-21 RX ORDER — FLUTICASONE PROPIONATE 50 MCG
2 SPRAY, SUSPENSION (ML) NASAL DAILY
COMMUNITY

## 2022-03-21 NOTE — PROGRESS NOTES
[facility]  [id number]  Cardiology Follow Up    Miky Patten  11/25/1927  [id number]      1  Chronic systolic heart failure ([address] )     2  Atherosclerosis of native coronary artery of native heart without angina pectoris     3  Mitral valve insufficiency, unspecified etiology     4  Primary hypertension     5  Mixed hyperlipidemia         Chief Complaint   Patient presents with    Follow-up       Interval History:   22-year-old male with coronary artery disease status post CABG in 5637, chronic systolic heart failure LVEF 35-40%, moderate to severe mitral regurgitation, hypertension, hyperlipidemia, CKD, carotid artery stenosis, history of seizure, ME a, dementia presented for follow-up    Patient was recently admitted at [facility] in January 2022  He was managed for acute on chronic systolic heart failure  LVEF was 35-40% with mild diffuse hypokinesis  He was successfully diuresed and was discharged  He was noted to have mildly elevated troponin during hospital admission which was thought secondary to heart failure  There was a discussion about further cardiac workup given prior coronary artery disease and heart failure and patient and family opted for medical management    Patient used to follow-up with primary cardiologist in [place] but now he is living years so he would like to change    He had an virtual visit done in January 2022 after hospital discharge  He is here for follow-up  Patient is doing well  He denies at present time chest pain, shortness of breath, palpitation, dizziness, orthopnea, leg edema or loss of conscious  He did not gain any significant vein since hospital discharge  He was 172-173 lb currently is 162 lb  He walks with a walker and cane sometimes without any chest pain or shortness of breath  Denies any bleeding issues    Current medications reviewed  Patient is taking aspirin 325 mg since his bypass surgery  Risk and benefit of low-dose aspirin versus 325 mg aspirin discussed with patient and daughter in the clinic  They understand and would like to think about it and maybe they will decrease it to 81   Labs from 2022 reviewed  Creatinine 1 8    Review of Systems: All review of system negative except as mentioned above    Patient Active Problem List   Diagnosis    Hypertension    Hyperlipidemia    CAD (coronary artery disease)    Benign prostatic hyperplasia    Actinic keratosis    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, or chronic kidney disease (HCC)    Seizure (HCC)    Acute gout involving toe of left foot    Stage 3b chronic kidney disease (HCC)    Chronic systolic heart failure (HCC)    Acute on chronic respiratory failure with hypoxia (HCC)    Hyponatremia    Mitral regurgitation    Cardiomyopathy ([facility] [address] )    Cough     Past Medical History:   Diagnosis Date    Arthritis     CHF (congestive heart failure) ([facility] [address] )     Coronary artery disease     Esophageal reflux     History of transfusion     "MANY YEARS AGO"    Hyperlipidemia     Hypertension     Renal disorder     PT DENIES, BUT ON PROBLEM LIST    Stroke ([facility] [address] )     NOT SURE     Social History     Socioeconomic History    Marital status:      Spouse name: Not on file    Number of children: Not on file    Years of education: Not on file    Highest education level: Not on file   Occupational History    Not on file   Tobacco Use    Smoking status: Former Smoker     Packs/day: 0 25     Years: 80 00     Pack years: 20 00     Types: Cigars, Cigarettes     Quit date: 1950     Years since quittin 9    Smokeless tobacco: Current User    Tobacco comment: occasional-cigar   Vaping Use    Vaping Use: Never used   Substance and Sexual Activity    Alcohol use:  Yes     Alcohol/week: 2 0 standard drinks     Types: 1 Glasses of wine, 1 Cans of beer per week     Comment: daily    Drug use: No    Sexual activity: Not Currently     Partners: Female   Other Topics Concern    Not on file   Social History Narrative    Not on file     Social Determinants of Health     Financial Resource Strain: Not on file   Food Insecurity: No Food Insecurity    Worried About Running Out of Food in the Last Year: Never true    Bubba of Food in the Last Year: Never true   Transportation Needs: No Transportation Needs    Lack of Transportation (Medical): No    Lack of Transportation (Non-Medical):  No   Physical Activity: Not on file   Stress: No Stress Concern Present    Feeling of Stress : Not at all   Social Connections: Not on file   Intimate Partner Violence: Not on file   Housing Stability: Low Risk     Unable to Pay for Housing in the Last Year: No    Number of Places Lived in the Last Year: 1    Unstable Housing in the Last Year: No      Family History   Problem Relation Age of Onset    Cancer Mother     Stroke Father      Past Surgical History:   Procedure Laterality Date    ABDOMINAL SURGERY      BACK SURGERY      CARDIAC SURGERY      COLON SURGERY      CORONARY ARTERY BYPASS GRAFT      FRACTURE SURGERY      TONSILLECTOMY      Last Assessed: 12/5/2016       Current Outpatient Medications:     amLODIPine (NORVASC) 5 mg tablet, Take 1 tablet (5 mg total) by mouth daily, Disp: 90 tablet, Rfl: 3    aspirin (ECOTRIN) 325 mg EC tablet, Take 1 tablet by mouth daily, Disp: , Rfl:     cyanocobalamin (VITAMIN B-12) 500 MCG tablet, Take 1 tablet (500 mcg total) by mouth daily, Disp: 90 tablet, Rfl: 3    fexofenadine (ALLEGRA) 60 MG tablet, Take 60 mg by mouth daily, Disp: , Rfl:     fluticasone (FLONASE) 50 mcg/act nasal spray, 2 sprays into each nostril daily, Disp: , Rfl:     furosemide (LASIX) 20 mg tablet, Take 1 tablet (20 mg total) by mouth daily, Disp: 90 tablet, Rfl: 1    levETIRAcetam (KEPPRA) 250 mg tablet, TAKE 1 TABLET TWICE A DAY, Disp: 180 tablet, Rfl: 3    pantoprazole (PROTONIX) 40 mg tablet, Take 40 mg by mouth daily, Disp: , Rfl:     Polyethylene Glycol 3350 (MIRALAX PO), Take by mouth as needed  , Disp: , Rfl:     simvastatin (ZOCOR) 40 mg tablet, Take 40 mg by mouth daily, Disp: , Rfl:     tamsulosin (FLOMAX) 0 4 mg, Take 0 4 mg by mouth daily, Disp: , Rfl:     TOPROL XL 25 MG 24 hr tablet, Take 25 mg by mouth daily , Disp: , Rfl:     benzonatate (TESSALON PERLES) 100 mg capsule, Take 1 capsule (100 mg total) by mouth 3 (three) times a day as needed for cough (Patient not taking: Reported on 3/21/2022 ), Disp: 30 capsule, Rfl: 0    ergocalciferol (VITAMIN D2) 50,000 units, TAKE 1 CAPSULE BY MOUTH ONE TIME PER WEEK (Patient not taking: Reported on 3/21/2022), Disp: 12 capsule, Rfl: 1  Allergies   Allergen Reactions    Penicillamine Rash    Penicillins Rash     Rash on hands    Morphine Other (See Comments)     Low blood pressure       Labs:  No results displayed because visit has over 200 results        Appointment on 11/17/2021   Component Date Value    WBC 11/17/2021 9 46     RBC 11/17/2021 3 43*    Hemoglobin 11/17/2021 11 5*    Hematocrit 11/17/2021 34 8*    MCV 11/17/2021 102*    MCH 11/17/2021 33 5     MCHC 11/17/2021 33 0     RDW 11/17/2021 13 1     MPV 11/17/2021 9 3     Platelets 77/97/2773 241     nRBC 11/17/2021 0     Neutrophils Relative 11/17/2021 71     Immat GRANS % 11/17/2021 0     Lymphocytes Relative 11/17/2021 15     Monocytes Relative 11/17/2021 6     Eosinophils Relative 11/17/2021 7*    Basophils Relative 11/17/2021 1     Neutrophils Absolute 11/17/2021 6 79     Immature Grans Absolute 11/17/2021 0 02     Lymphocytes Absolute 11/17/2021 1 37     Monocytes Absolute 11/17/2021 0 55     Eosinophils Absolute 11/17/2021 0 68*    Basophils Absolute 11/17/2021 0 05     Cholesterol 11/17/2021 159     Triglycerides 11/17/2021 66     HDL, Direct 11/17/2021 90     LDL Calculated 11/17/2021 56     Non-HDL-Chol (CHOL-HDL) 11/17/2021 69     PSA, Diagnostic 11/17/2021 1 0    Appointment on 09/29/2021   Component Date Value    WBC 09/29/2021 4 84     RBC 09/29/2021 3 53*    Hemoglobin 09/29/2021 11 7*    Hematocrit 09/29/2021 35 5*    MCV 09/29/2021 101*    MCH 09/29/2021 33 1     MCHC 09/29/2021 33 0     RDW 09/29/2021 13 3     Platelets 98/24/2400 263     MPV 09/29/2021 9 4     Cholesterol 09/29/2021 171     Triglycerides 09/29/2021 84     HDL, Direct 09/29/2021 91     LDL Calculated 09/29/2021 63     Non-HDL-Chol (CHOL-HDL) 09/29/2021 80     Sodium 09/29/2021 135*    Potassium 09/29/2021 4 5     Chloride 09/29/2021 98*    CO2 09/29/2021 25     ANION GAP 09/29/2021 12     BUN 09/29/2021 24     Creatinine 09/29/2021 1 73*    Glucose, Fasting 09/29/2021 107*    Calcium 09/29/2021 9 4     AST 09/29/2021 20     ALT 09/29/2021 21     Alkaline Phosphatase 09/29/2021 67     Total Protein 09/29/2021 8 0     Albumin 09/29/2021 4 3     Total Bilirubin 09/29/2021 0 60     eGFR 09/29/2021 33     Hemoglobin A1C 09/29/2021 6 2*    EAG 09/29/2021 131      Imaging: XR chest pa & lateral    Result Date: 3/11/2022  Narrative: CHEST INDICATION:   R05 9: Cough, unspecified  COMPARISON:  1/2/2022 EXAM PERFORMED/VIEWS:  XR CHEST PA & LATERAL FINDINGS: Cardiomediastinal silhouette appears unremarkable  CABG  v The lungs are clear  No pneumothorax or pleural effusion  Osseous structures appear within normal limits for patient age  Impression: No acute cardiopulmonary disease   Workstation performed: [id number]       Physical Exam:  General:  Ambulate with cane, awake, alert and oriented x3, not in distress  Neck: supple, no JVD  Eyes: PERRL, conjunctiva normal  Lungs:  Bilateral air entry positive, no wheeze/rhonchi or crackle  Heart:  S1-S2 normal, no murmur  Abdomen:  Soft ,nondistended ,nontender, bowel sounds positive  Extremities:  No leg edema, no deformity, ROM normal  Neuro:  Moving all extremities, speech clear  Skin: warm, no rash  /76 (BP Location: Left arm, Patient Position: Sitting, Cuff Size: Standard)   Pulse 63   Resp 16   Ht 5' 3" (1 6 m)   Wt 73 5 kg (162 lb)   SpO2 91%   BMI 28 70 kg/m²     Cardiographics :    Echo on 01/03/2022 showed LVEF of 35 to 40%, moderate global hypokinesis, mildly dilated LA, mild AI, moderate to severe MR, mild TR    Assessment: 1  Chronic systolic heart failure  LVEF 35-40%   Compensated at present time    2  Coronary artery status post CABG in 2005  Denies chest pain    3  Moderate to severe MR  4  Hypertension  5  Hyperlipidemia  6  CKD  7  Carotid artery stenosis  8  History of seizure  9  Anemia  10  Dementia  11  Hyponatremia    Recommendations:  Patient is doing well from cardiology standpoint at present time  Patient to continue oral Lasix 20 mg daily, continue aspirin, statin, Toprol-XL  Not on Ace or Arb given CKD  Patient advised to follow-up with primary care for hyponatremia    Advised to take low-salt, low-fat/low-cholesterol diet   Patient was advised to weigh himself and if he gains more than 1-2 lb in a day or 5 lb in a week test he should let us know can take extra Lasix    Return to clinic in 6 months or early as needed  Above all discussed with patient and daughter in the clinic    They understands and agrees

## 2022-04-06 ENCOUNTER — RA CDI HCC (OUTPATIENT)
Dept: OTHER | Facility: HOSPITAL | Age: 87
End: 2022-04-06

## 2022-04-06 NOTE — PROGRESS NOTES
J43 9  [facility]  coding opportunities          Chart Reviewed number of suggestions sent to Provider: 1     Patients Insurance     Medicare Insurance: Sonny Finch

## 2022-04-07 ENCOUNTER — PATIENT OUTREACH (OUTPATIENT)
Dept: CASE MANAGEMENT | Facility: HOSPITAL | Age: 87
End: 2022-04-07

## 2022-04-07 NOTE — PROGRESS NOTES
Heart Failure Outpatient Care Coordinator Note:[facility]PCI closure, ended today  Episode resolved and outpatient care manager removed self from the care team  Call made to patient's daughter, Angel lee  She reports patient is doing very well  He is "back to his usual activities" and enjoying going to breakfast 2x/week with his friends  He has a caregiver who drives him  He has seen cardiology and pulmonary last month  [place]adi states his weights are stable and no signs of acute HF or volume overload  Denies needs or concerns

## 2022-04-19 DIAGNOSIS — I10 ESSENTIAL HYPERTENSION: Primary | ICD-10-CM

## 2022-04-19 RX ORDER — METOPROLOL SUCCINATE 25 MG
25 TABLET, EXTENDED RELEASE 24 HR ORAL DAILY
Qty: 90 TABLET | Refills: 3 | Status: SHIPPED | OUTPATIENT
Start: 2022-04-19 | End: 2022-04-20 | Stop reason: CLARIF

## 2022-04-20 ENCOUNTER — TELEPHONE (OUTPATIENT)
Dept: INTERNAL MEDICINE CLINIC | Facility: CLINIC | Age: 87
End: 2022-04-20

## 2022-04-20 DIAGNOSIS — I10 ESSENTIAL HYPERTENSION: Primary | ICD-10-CM

## 2022-04-20 RX ORDER — METOPROLOL SUCCINATE 25 MG/1
25 TABLET, EXTENDED RELEASE ORAL DAILY
Qty: 90 TABLET | Refills: 3 | Status: SHIPPED | OUTPATIENT
Start: 2022-04-20

## 2022-04-20 NOTE — TELEPHONE ENCOUNTER
Aubree Flanagan from [facility] [employer] will not cover script for Toprol XL 25mg    Would Metoprolol 25mg XL that insurance does cover be a suitable substitute?     Please Advise:  [phone]    REF# [id number]

## 2022-05-27 ENCOUNTER — APPOINTMENT (OUTPATIENT)
Dept: LAB | Facility: HOSPITAL | Age: 87
End: 2022-05-27
Payer: MEDICARE

## 2022-05-27 DIAGNOSIS — I50.9 CHF EXACERBATION (HCC): ICD-10-CM

## 2022-05-27 DIAGNOSIS — I10 PRIMARY HYPERTENSION: ICD-10-CM

## 2022-05-27 DIAGNOSIS — E55.9 VITAMIN D DEFICIENCY: ICD-10-CM

## 2022-05-27 DIAGNOSIS — N18.32 STAGE 3B CHRONIC KIDNEY DISEASE (HCC): ICD-10-CM

## 2022-05-27 LAB
25(OH)D3 SERPL-MCNC: 52.7 NG/ML (ref 30–100)
ALBUMIN SERPL BCP-MCNC: 4 G/DL (ref 3.5–5)
ALP SERPL-CCNC: 58 U/L (ref 46–116)
ALT SERPL W P-5'-P-CCNC: 17 U/L (ref 12–78)
ANION GAP SERPL CALCULATED.3IONS-SCNC: 11 MMOL/L (ref 4–13)
AST SERPL W P-5'-P-CCNC: 15 U/L (ref 5–45)
BASOPHILS # BLD AUTO: 0.03 THOUSANDS/ΜL (ref 0–0.1)
BASOPHILS NFR BLD AUTO: 0 % (ref 0–1)
BILIRUB SERPL-MCNC: 0.53 MG/DL (ref 0.2–1)
BUN SERPL-MCNC: 33 MG/DL (ref 5–25)
CALCIUM SERPL-MCNC: 9 MG/DL (ref 8.3–10.1)
CHLORIDE SERPL-SCNC: 98 MMOL/L (ref 100–108)
CHOLEST SERPL-MCNC: 163 MG/DL
CO2 SERPL-SCNC: 25 MMOL/L (ref 21–32)
CREAT SERPL-MCNC: 1.82 MG/DL (ref 0.6–1.3)
EOSINOPHIL # BLD AUTO: 0.41 THOUSAND/ΜL (ref 0–0.61)
EOSINOPHIL NFR BLD AUTO: 6 % (ref 0–6)
ERYTHROCYTE [DISTWIDTH] IN BLOOD BY AUTOMATED COUNT: 14.3 % (ref 11.6–15.1)
GFR SERPL CREATININE-BSD FRML MDRD: 31 ML/MIN/1.73SQ M
GLUCOSE P FAST SERPL-MCNC: 103 MG/DL (ref 65–99)
HCT VFR BLD AUTO: 35.4 % (ref 36.5–49.3)
HDLC SERPL-MCNC: 89 MG/DL
HGB BLD-MCNC: 11.5 G/DL (ref 12–17)
IMM GRANULOCYTES # BLD AUTO: 0.02 THOUSAND/UL (ref 0–0.2)
IMM GRANULOCYTES NFR BLD AUTO: 0 % (ref 0–2)
LDLC SERPL CALC-MCNC: 61 MG/DL (ref 0–100)
LYMPHOCYTES # BLD AUTO: 1.51 THOUSANDS/ΜL (ref 0.6–4.47)
LYMPHOCYTES NFR BLD AUTO: 21 % (ref 14–44)
MCH RBC QN AUTO: 32.6 PG (ref 26.8–34.3)
MCHC RBC AUTO-ENTMCNC: 32.5 G/DL (ref 31.4–37.4)
MCV RBC AUTO: 100 FL (ref 82–98)
MONOCYTES # BLD AUTO: 0.54 THOUSAND/ΜL (ref 0.17–1.22)
MONOCYTES NFR BLD AUTO: 7 % (ref 4–12)
NEUTROPHILS # BLD AUTO: 4.74 THOUSANDS/ΜL (ref 1.85–7.62)
NEUTS SEG NFR BLD AUTO: 66 % (ref 43–75)
NONHDLC SERPL-MCNC: 74 MG/DL
NRBC BLD AUTO-RTO: 0 /100 WBCS
PLATELET # BLD AUTO: 214 THOUSANDS/UL (ref 149–390)
PMV BLD AUTO: 9.4 FL (ref 8.9–12.7)
POTASSIUM SERPL-SCNC: 4.7 MMOL/L (ref 3.5–5.3)
PROT SERPL-MCNC: 7.7 G/DL (ref 6.4–8.2)
RBC # BLD AUTO: 3.53 MILLION/UL (ref 3.88–5.62)
SODIUM SERPL-SCNC: 134 MMOL/L (ref 136–145)
TRIGL SERPL-MCNC: 67 MG/DL
WBC # BLD AUTO: 7.25 THOUSAND/UL (ref 4.31–10.16)

## 2022-05-27 PROCEDURE — 85025 COMPLETE CBC W/AUTO DIFF WBC: CPT

## 2022-05-27 PROCEDURE — 80053 COMPREHEN METABOLIC PANEL: CPT

## 2022-05-27 PROCEDURE — 82306 VITAMIN D 25 HYDROXY: CPT

## 2022-05-27 PROCEDURE — 80061 LIPID PANEL: CPT

## 2022-05-27 PROCEDURE — 36415 COLL VENOUS BLD VENIPUNCTURE: CPT

## 2022-05-31 ENCOUNTER — OFFICE VISIT (OUTPATIENT)
Dept: INTERNAL MEDICINE CLINIC | Facility: CLINIC | Age: 87
End: 2022-05-31
Payer: MEDICARE

## 2022-05-31 VITALS
BODY MASS INDEX: 29.77 KG/M2 | DIASTOLIC BLOOD PRESSURE: 62 MMHG | HEIGHT: 63 IN | HEART RATE: 64 BPM | SYSTOLIC BLOOD PRESSURE: 115 MMHG | OXYGEN SATURATION: 98 % | WEIGHT: 168 LBS | RESPIRATION RATE: 20 BRPM | TEMPERATURE: 98 F

## 2022-05-31 DIAGNOSIS — E78.2 MIXED HYPERLIPIDEMIA: ICD-10-CM

## 2022-05-31 DIAGNOSIS — I13.0 HYPERTENSIVE HEART AND KIDNEY DISEASE WITH CHRONIC SYSTOLIC CONGESTIVE HEART FAILURE AND STAGE 3B CHRONIC KIDNEY DISEASE (HCC): Primary | Chronic | ICD-10-CM

## 2022-05-31 DIAGNOSIS — N18.32 HYPERTENSIVE HEART AND KIDNEY DISEASE WITH CHRONIC SYSTOLIC CONGESTIVE HEART FAILURE AND STAGE 3B CHRONIC KIDNEY DISEASE (HCC): Primary | Chronic | ICD-10-CM

## 2022-05-31 DIAGNOSIS — I50.22 HYPERTENSIVE HEART AND KIDNEY DISEASE WITH CHRONIC SYSTOLIC CONGESTIVE HEART FAILURE AND STAGE 3B CHRONIC KIDNEY DISEASE (HCC): Primary | Chronic | ICD-10-CM

## 2022-05-31 DIAGNOSIS — J43.9 PULMONARY EMPHYSEMA, UNSPECIFIED EMPHYSEMA TYPE (HCC): ICD-10-CM

## 2022-05-31 PROBLEM — E87.1 HYPONATREMIA: Status: RESOLVED | Noted: 2022-01-07 | Resolved: 2022-05-31

## 2022-05-31 PROBLEM — R05.9 COUGH: Status: RESOLVED | Noted: 2022-03-07 | Resolved: 2022-05-31

## 2022-05-31 PROBLEM — J96.21 ACUTE ON CHRONIC RESPIRATORY FAILURE WITH HYPOXIA (HCC): Status: RESOLVED | Noted: 2022-01-02 | Resolved: 2022-05-31

## 2022-05-31 PROCEDURE — 99214 OFFICE O/P EST MOD 30 MIN: CPT | Performed by: INTERNAL MEDICINE

## 2022-05-31 RX ORDER — FLUTICASONE FUROATE, UMECLIDINIUM BROMIDE AND VILANTEROL TRIFENATATE 100; 62.5; 25 UG/1; UG/1; UG/1
1 POWDER RESPIRATORY (INHALATION) DAILY
COMMUNITY

## 2022-05-31 NOTE — PATIENT INSTRUCTIONS
Chronic Hypertension   AMBULATORY CARE:   Hypertension  is high blood pressure  Your blood pressure is the force of your blood moving against the walls of your arteries  Hypertension causes your blood pressure to get so high that your heart has to work much harder than normal  This can damage your heart  Even if you have hypertension for years, lifestyle changes, medicines, or both can help bring your blood pressure to normal   Call your local emergency number (911 in the [address] [facility]) or have someone call if:   You have chest pain  You have any of the following signs of a heart attack:      Squeezing, pressure, or pain in your chest    You may  also have any of the following:     Discomfort or pain in your back, neck, jaw, stomach, or arm    Shortness of breath    Nausea or vomiting    Lightheadedness or a sudden cold sweat    You become confused or have difficulty speaking  You suddenly feel lightheaded or have trouble breathing  Seek care immediately if:   You have a severe headache or vision loss  You have weakness in an arm or leg  Call your doctor or cardiologist if:   You feel faint, dizzy, confused, or drowsy  You have been taking your blood pressure medicine but your pressure is higher than your provider says it should be  You have questions or concerns about your condition or care  Treatment for chronic hypertension  may include medicine to lower your blood pressure and cholesterol levels  A low cholesterol level helps prevent heart disease and makes it easier to control your blood pressure  Heart disease can make your blood pressure harder to control  You may also need to make lifestyle changes  What you need to know about the stages of hypertension:       Normal blood pressure is 119/79 or lower   Your healthcare provider may only check your blood pressure each year if it stays at a normal level  Elevated blood pressure is 120/79 to 129/79   This is sometimes called prehypertension  Your healthcare provider may suggest lifestyle changes to help lower your blood pressure to a normal level  He or she may then check it again in 3 to 6 months  Stage 1 hypertension is 130/80  to 139/89   Your provider may recommend lifestyle changes, medication, and checks every 3 to 6 months until your blood pressure is controlled  Stage 2 hypertension is 140/90 or higher   Your provider will recommend lifestyle changes and have you take 2 kinds of hypertension medicines  You will also need to have your blood pressure checked monthly until it is controlled  Manage chronic hypertension:   Check your blood pressure at home  Avoid smoking, caffeine, and exercise at least 30 minutes before checking your blood pressure  Sit and rest for 5 minutes before you take your blood pressure  Extend your arm and support it on a flat surface  Your arm should be at the same level as your heart  Follow the directions that came with your blood pressure monitor  Check your blood pressure 2 times, 1 minute apart, before you take your medicine in the morning  Also check your blood pressure before your evening meal  Keep a record of your readings and bring it to your follow-up visits  Ask your healthcare provider what your blood pressure should be  Manage any other health conditions you have  Health conditions such as diabetes can increase your risk for hypertension  Follow your healthcare provider's instructions and take all your medicines as directed  Talk to your healthcare provider about any new health conditions you have recently developed  Ask about all medicines  Certain medicines can increase your blood pressure  Examples include oral birth control pills, decongestants, herbal supplements, and NSAIDs, such as ibuprofen  Your healthcare provider can tell you which medicines are safe for you to take  This includes prescription and over-the-counter medicines      Lifestyle changes you can make to lower your blood pressure: Your provider may want you to make more lifestyle changes if you are having trouble controlling your blood pressure  This may feel difficult over time, especially if you think you are making good changes but your pressure is still high  It might help to focus on one new change at a time  For example, try to add 1 more day of exercise, or exercise for an extra 10 minutes on 2 days  Small changes can make a big difference  Your healthcare provider can also refer you to specialists such as a dietitian who can help you make small changes  Your family members may be included in helping you learn to create lifestyle changes, such as the following:     Limit sodium (salt) as directed  Too much sodium can affect your fluid balance  Check labels to find low-sodium or no-salt-added foods  Some low-sodium foods use potassium salts for flavor  Too much potassium can also cause health problems  Your healthcare provider will tell you how much sodium and potassium are safe for you to have in a day  He or she may recommend that you limit sodium to 2,300 mg a day  Follow the meal plan recommended by your healthcare provider  A dietitian or your provider can give you more information on low-sodium plans or the DASH (Dietary Approaches to Stop Hypertension) eating plan  The DASH plan is low in sodium, processed sugar, unhealthy fats, and total fat  It is high in potassium, calcium, and fiber  These can be found in vegetables, fruit, and whole-grain foods  Be physically active throughout the day  Physical activity, such as exercise, can help control your blood pressure and your weight  Be physically active for at least 30 minutes per day, on most days of the week  Include aerobic activity, such as walking or riding a bicycle  Also include strength training at least 2 times each week  Your healthcare providers can help you create a physical activity plan  Decrease stress    This may help lower your blood pressure  Learn ways to relax, such as deep breathing or listening to music  Limit alcohol as directed  Alcohol can increase your blood pressure  A drink of alcohol is 12 ounces of beer, 5 ounces of wine, or 1½ ounces of liquor  Do not smoke  Nicotine and other chemicals in cigarettes and cigars can increase your blood pressure and also cause lung damage  Ask your healthcare provider for information if you currently smoke and need help to quit  E-cigarettes or smokeless tobacco still contain nicotine  Talk to your healthcare provider before you use these products  Follow up with your doctor or cardiologist as directed: You will need to return to have your blood pressure checked and to have other lab tests done  Write down your questions so you remember to ask them during your visits  © Copyright [employer] 2022 Information is for End User's use only and may not be sold, redistributed or otherwise used for commercial purposes  All illustrations and images included in CareNotes® are the copyrighted property of [employer] D A M [employer] Inc  or [facility] Manuel Mireles   The above information is an  only  It is not intended as medical advice for individual conditions or treatments  Talk to your doctor, nurse or pharmacist before following any medical regimen to see if it is safe and effective for you

## 2022-05-31 NOTE — PROGRESS NOTES
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 ([facility] [address] )        Lab Units 22  0946 22  0530 22  0438   CREATININE mg/dL 1 82* 1 81* 1 69*   EGFR ml/min/1 73sq m 31 31 34     Cr between 1 6-1 8  Avoid dehydration  Avoid volume overload  Keep BP controlled  Continue current anti-HTN therapy  No evidence of fluid overload today  Monitor weight  Will keep on same dose of lasix  - CBC; Future  - Basic metabolic panel; Future    2  Pulmonary emphysema, unspecified emphysema type (HCC)    Mild emphysema seen on imaging  Continue inhaler as prescribed  Follows with [facility] pulmonology  3  Mixed hyperlipidemia    Stable and well controlled  Continue simvastatin  - Lipid panel; Future    BMI Counseling: Body mass index is 29 76 kg/m²  Follow-up plan was not completed due to elderly patient (72 years old) where weight reduction/weight gain would complicate underlying health condition such as: illness or physical disability  Depression Screening and Follow-up Plan: Patient was screened for depression during today's encounter  They screened negative with a PHQ-2 score of 0  Return in about 6 months (around 2022) for Subsequent AWV  History of Present Illness:     Reyna Adan presents for f/u  No changes with his health  Denies any problems with his breathing lately  He was admitted back in 2022 due to CHF exacerbation  Has dilated CMP and chronic systolic CHF  Follows with cardiology  Also has findings of emphysema on imaging  He is a former smoker  Review of Systems:     Review of Systems   Constitutional: Negative  Respiratory: Negative  Cardiovascular: Negative  Gastrointestinal: Negative      Musculoskeletal: Positive for arthralgias and gait problem  Objective:     /62 (BP Location: Left arm, Patient Position: Sitting, Cuff Size: Standard)   Pulse 64   Temp 98 °F (36 7 °C) (Tympanic)   Resp 20   Ht 5' 3" (1 6 m)   Wt 76 2 kg (168 lb)   SpO2 98%   BMI 29 76 kg/m²     Physical Exam  Constitutional:       General: He is not in acute distress  Appearance: He is not ill-appearing  Cardiovascular:      Rate and Rhythm: Normal rate and regular rhythm  Heart sounds: No murmur heard  Pulmonary:      Effort: Pulmonary effort is normal  No respiratory distress  Breath sounds: No wheezing  Abdominal:      General: Bowel sounds are normal  There is no distension  Tenderness: There is no abdominal tenderness  Musculoskeletal:      Right lower leg: No edema  Left lower leg: No edema  Neurological:      Mental Status: He is alert           Ruy Matthews DO  [facility]

## 2022-07-25 DIAGNOSIS — I50.9 CHF EXACERBATION (HCC): ICD-10-CM

## 2022-07-25 RX ORDER — FUROSEMIDE 20 MG/1
TABLET ORAL
Qty: 90 TABLET | Refills: 3 | Status: SHIPPED | OUTPATIENT
Start: 2022-07-25

## 2022-07-26 DIAGNOSIS — R05.3 CHRONIC COUGH: ICD-10-CM

## 2022-07-26 DIAGNOSIS — E78.2 MIXED HYPERLIPIDEMIA: Primary | ICD-10-CM

## 2022-07-26 RX ORDER — PANTOPRAZOLE SODIUM 40 MG/1
40 TABLET, DELAYED RELEASE ORAL DAILY
Qty: 90 TABLET | Refills: 1 | Status: SHIPPED | OUTPATIENT
Start: 2022-07-26

## 2022-07-26 RX ORDER — SIMVASTATIN 40 MG
40 TABLET ORAL DAILY
Qty: 90 TABLET | Refills: 1 | Status: SHIPPED | OUTPATIENT
Start: 2022-07-26

## 2022-07-26 NOTE — TELEPHONE ENCOUNTER
Patient is requesting that his PCP take over these two medications, , Medication Refill Request     Name Simvastatin ( Zocor ) 40 mg tablet   Dose/Frequency 1 by mouth daily   Quantity 90    Name Pantoprazole (protonix) 40 mg tablets   Dose/Frequency Take 40 mg by mouth daily   Quantity 90      Verified pharmacy   [x]  Verified ordering Provider   [x]  Does patient have enough for the next 3 days?  Yes [x] No []

## 2022-07-27 ENCOUNTER — TELEPHONE (OUTPATIENT)
Dept: INTERNAL MEDICINE CLINIC | Facility: CLINIC | Age: 87
End: 2022-07-27

## 2022-07-27 DIAGNOSIS — U07.1 COVID-19: Primary | ICD-10-CM

## 2022-07-27 NOTE — TELEPHONE ENCOUNTER
Patient tested positive for COVID today  Daughter Mac Roovyns would like a call back with any suggestions on what Theresa Dang can take or any recommendations on how to treat  Pt has cold like symptoms, no cough, did have fever   Pt is vaccinated and boosted      Call back to Mac Roovyns at [phone]

## 2022-07-27 NOTE — TELEPHONE ENCOUNTER
Spoke to daughter    [address] is an investigational medicine used to treat mild-to-moderate COVID-19 in adults and children [15years of age and older weighing at least 80 pounds (36 kg)] with positive results of direct SARS-CoV-2 viral testing, and who are at high risk for progression to severe COVID-19, including hospitalization or death  PAXLOVID is investigational because it is still being studied  There is limited information about the safety and effectiveness of using PAXLOVID to treat people with mild-to-moderate COVID-19  The FDA has authorized the emergency use of PAXLOVID for the treatment of mild-tomoderate COVID-19 in adults and children [15years of age and older weighing at least 80 pounds (36 kg)] with a positive test for the virus that causes COVID-19, and who are at high risk for progression to severe COVID-19, including hospitalization or death, under an EUA  What should I tell my healthcare provider before I take PAXLOVID? Tell your healthcare provider if you:   Have any allergies   Have liver or kidney disease   Are pregnant or plan to become pregnant   Are breastfeeding a child   ave any serious illnesses    Tell your healthcare provider about all the medicines you take, including prescription and over-the-counter medicines, vitamins, and herbal supplements  Some medicines may interact with PAXLOVID and may cause serious side effects  Keep a list of your medicines to show your healthcare provider and pharmacist when you get a new medicine  You can ask your healthcare provider or pharmacist for a list of medicines that interact with PAXLOVID  Do not start taking a new medicine without telling your healthcare provider  Your healthcare provider can tell you if it is safe to take PAXLOVID with other medicines  Tell your healthcare provider if you are taking combined hormonal contraceptive  PAXLOVID may affect how your birth control pills work   Females who are able to become pregnant should use another effective alternative form of contraception or an additional barrier method of contraception  Talk to your healthcare provider if you have any questions about contraceptive methods that might be right for you  How do I take [address] consists of 2 medicines: nirmatrelvir and ritonavir  o Take 2 pink tablets of nirmatrelvir with 1 white tablet of ritonavir by mouth 2 times each day (in the morning and in the evening) for 5 days  For each dose, take all 3 tablets at the same time  o If you have kidney disease, talk to your healthcare provider  You may need a different dose   Swallow the tablets whole  Do not chew, break, or crush the tablets   Take PAXLOVID with or without food   Do not stop taking PAXLOVID without talking to your healthcare provider, even if you feel better   If you miss a dose of PAXLOVID within 8 hours of the time it is usually taken, take it as soon as you remember  If you miss a dose by more than 8 hours, skip the missed dose and take the next dose at your regular time  Do not take 2 doses of PAXLOVID at the same time   If you take too much PAXLOVID, call your healthcare provider or go to the nearest hospital emergency room right away   If you are taking a ritonavir- or cobicistat-containing medicine to treat hepatitis C or Human Immunodeficiency Virus (HIV), you should continue to take your medicine as prescribed by your healthcare provider   Talk to your healthcare provider if you do not feel better or if you feel worse after 5 days  Who should generally not take PAXLOVID? Do not take PAXLOVID if:   You are allergic to nirmatrelvir, ritonavir, or any of the ingredients in PAXLOVID     You are taking any of the following medicines:  o Alfuzosin  o Pethidine, piroxicam, propoxyphene  o Ranolazine  o Amiodarone, dronedarone, flecainide, propafenone, quinidine  o Colchicine  o Lurasidone, pimozide, clozapine  o Dihydroergotamine, ergotamine, methylergonovine  o Lovastatin, simvastatin  o Sildenafil (Revatio®) for pulmonary arterial hypertension (PAH)  o Triazolam, oral midazolam  o Apalutamide  o Carbamazepine, phenobarbital, phenytoin  o Rifampin  o St  Mayos Wort (hypericum perforatum)    What are the important possible side effects of PAXLOVID? Possible side effects of PAXLOVID are:   Liver Problems  Tell your healthcare provider right away if you have any of these signs and symptoms of liver problems: loss of appetite, yellowing of your skin and the whites of eyes (jaundice), dark-colored urine, pale colored stools and itchy skin, stomach area (abdominal) pain   Resistance to HIV Medicines  If you have untreated HIV infection, PAXLOVID may lead to some HIV medicines not working as well in the future   Other possible side effects include: altered sense of taste, diarrhea, high blood pressure, or muscle aches    These are not all the possible side effects of PAXLOVID  Not many people have taken PAXLOVID  Serious and unexpected side effects may happen  Thena Bras is still being studied, so it is possible that all of the risks are not known at this time  What other treatment choices are there? Like Agusto Garland may allow for the emergency use of other medicines to treat people with COVID-19  Go to https://[employer]/ for information on the emergency use of other medicines that are authorized by FDA to treat people with COVID-19  Your healthcare provider may talk with you about clinical trials for which you may be eligible  It is your choice to be treated or not to be treated with PAXLOVID  Should you decide not to receive it or for your child not to receive it, it will not change your standard medical care  What if I am pregnant or breastfeeding?     There is no experience treating pregnant women or breastfeeding mothers with PAXLOVID  For a mother and unborn baby, the benefit of taking PAXLOVID may be greater than the risk from the treatment  If you are pregnant, discuss your options and specific situation with your healthcare provider  It is recommended that you use effective barrier contraception or do not have sexual activity while taking PAXLOVID  If you are breastfeeding, discuss your options and specific situation with your healthcare provider  How do I report side effects with PAXLOVID? Contact your healthcare provider if you have any side effects that bother you or do not go away  Report side effects to FDA MedWatch at www [employer] gov/medwatch or call 2-004-HUW3578 or you can report side effects to [facility]  at the contact information provided below  Website Fax number Telephone number   [employer] [phone]     How should I store [address]? Store PAXLOVID tablets at room temperature between 68°F to 77°F (20°C to 25°C)    Full fact sheet for patients, parents, and caregivers can be found at: http://www [employer]/

## 2022-08-03 DIAGNOSIS — I10 ESSENTIAL HYPERTENSION: ICD-10-CM

## 2022-08-03 RX ORDER — AMLODIPINE BESYLATE 5 MG/1
TABLET ORAL
Qty: 90 TABLET | Refills: 3 | Status: SHIPPED | OUTPATIENT
Start: 2022-08-03

## 2022-08-31 DIAGNOSIS — R56.9 SEIZURE (HCC): ICD-10-CM

## 2022-08-31 RX ORDER — LEVETIRACETAM 250 MG/1
TABLET ORAL
Qty: 180 TABLET | Refills: 3 | Status: SHIPPED | OUTPATIENT
Start: 2022-08-31

## 2022-11-08 ENCOUNTER — OFFICE VISIT (OUTPATIENT)
Dept: CARDIOLOGY CLINIC | Facility: CLINIC | Age: 87
End: 2022-11-08

## 2022-11-08 VITALS
SYSTOLIC BLOOD PRESSURE: 134 MMHG | WEIGHT: 172 LBS | BODY MASS INDEX: 30.48 KG/M2 | DIASTOLIC BLOOD PRESSURE: 72 MMHG | HEART RATE: 60 BPM | OXYGEN SATURATION: 97 % | HEIGHT: 63 IN | RESPIRATION RATE: 16 BRPM

## 2022-11-08 DIAGNOSIS — I50.22 CHF (CONGESTIVE HEART FAILURE), NYHA CLASS I, CHRONIC, SYSTOLIC (HCC): Primary | ICD-10-CM

## 2022-11-08 DIAGNOSIS — I25.10 CAD IN NATIVE ARTERY: ICD-10-CM

## 2022-11-08 DIAGNOSIS — N18.30 STAGE 3 CHRONIC KIDNEY DISEASE, UNSPECIFIED WHETHER STAGE 3A OR 3B CKD (HCC): ICD-10-CM

## 2022-11-08 NOTE — PROGRESS NOTES
Cardiology Follow Up    Nile Nelson  11/25/1927  [id number]  [facility] [address]  [address]  [facility] [address] [phone]        Interval History:      70-year-old male with coronary artery disease status post CABG in 2766, chronic systolic heart failure LVEF 35-40%, moderate to severe mitral regurgitation, hypertension, hyperlipidemia, CKD, carotid artery stenosis, history of seizure, mild dementia presents for follow-up  He now feels well  No sob, cp, weight gain, swelling, orthopnea, pnd       Patient was admitted at [facility] in January 2022  He was managed for acute on chronic systolic heart failure  LVEF was 35-40% with mild diffuse hypokinesis  He was successfully diuresed and was discharged  He was noted to have mildly elevated troponin during hospital admission which was thought secondary to heart failure  There was a discussion about further cardiac workup given prior coronary artery disease and heart failure and patient and family opted for medical management      Echocardiogram January 3, 2022  •  Left Ventricle: Left ventricular cavity size is normal  Wall thickness is mildly increased  Systolic function is severely reduced  Estimated LVEF 35-40%(recommend MUGA scan for accurate LVEF) There is moderate global hypokinesis with regional variation - mid anteroseptum, apical wall appears severely hypokinetic There is mild concentric hypertrophy  Diastolic function is mildly abnormal, consistent with grade I (abnormal) relaxation  •  IVS: There is abnormal septal motion consistent with left bundle branch block  •  Left Atrium: The atrium is mildly dilated  •  Aortic Valve: There is mild regurgitation  •  Mitral Valve: There is moderate thickening  There is moderate annular calcification  There is moderate to severe regurgitation  •  Tricuspid Valve:  There is mild regurgitation          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)   • Seizure (HCC)   • Acute gout involving toe of left foot   • Stage 3b chronic kidney disease (HCC)   • Chronic systolic heart failure (HCC)   • Mitral regurgitation   • Cardiomyopathy ([facility] [address] )   • Pulmonary emphysema, unspecified emphysema type ([facility] [address] )     Past Medical History:   Diagnosis Date   • Arthritis    • CHF (congestive heart failure) ([facility])    • Coronary artery disease    • Esophageal reflux    • History of transfusion     "MANY YEARS AGO"   • Hyperlipidemia    • Hypertension    • Renal disorder     PT DENIES, BUT ON PROBLEM LIST   • Stroke [facility])     NOT SURE     Social History     Socioeconomic History   • Marital status:      Spouse name: Not on file   • Number of children: Not on file   • Years of education: Not on file   • Highest education level: Not on file   Occupational History   • Not on file   Tobacco Use   • Smoking status: Former Smoker     Packs/day: 0 25     Years: 80 00     Pack years: 20 00     Types: Cigars, Cigarettes     Quit date: 1950     Years since quittin 5   • Smokeless tobacco: Current User   • Tobacco comment: occasional-cigar   Vaping Use   • Vaping Use: Never used   Substance and Sexual Activity   • Alcohol use:  Yes     Alcohol/week: 2 0 standard drinks     Types: 1 Glasses of wine, 1 Cans of beer per week     Comment: daily   • Drug use: No   • Sexual activity: Not Currently     Partners: Female   Other Topics Concern   • Not on file   Social History Narrative   • Not on file     Social Determinants of Health     Financial Resource Strain: Not on file   Food Insecurity: No Food Insecurity   • Worried About [address] in the Last Year: Never true   • Ran Out of Food in the Last Year: Never true   Transportation Needs: No Transportation Needs   • Lack of Transportation (Medical): No   • Lack of Transportation (Non-Medical):  No   Physical Activity: Not on file   Stress: Not on file   Social Connections: Not on file   Intimate Partner Violence: Not on file   Housing Stability: Low Risk    • Unable to Pay for Housing in the Last Year: No   • Number of Places Lived in the Last Year: 1   • Unstable Housing in the Last Year: No      Family History   Problem Relation Age of Onset   • Cancer Mother    • Stroke Father      Past Surgical History:   Procedure Laterality Date   • ABDOMINAL SURGERY     • BACK SURGERY     • CARDIAC SURGERY     • COLON SURGERY     • CORONARY ARTERY BYPASS GRAFT     • FRACTURE SURGERY     • TONSILLECTOMY      Last Assessed: 12/5/2016       Current Outpatient Medications:   •  amLODIPine (NORVASC) 5 mg tablet, TAKE 1 TABLET DAILY, Disp: 90 tablet, Rfl: 3  •  aspirin (ECOTRIN) 325 mg EC tablet, Take 1 tablet by mouth daily, Disp: , Rfl:   •  cyanocobalamin (VITAMIN B-12) 500 MCG tablet, Take 1 tablet (500 mcg total) by mouth daily, Disp: 90 tablet, Rfl: 3  •  fexofenadine (ALLEGRA) 60 MG tablet, Take 60 mg by mouth daily, Disp: , Rfl:   •  fluticasone (FLONASE) 50 mcg/act nasal spray, 2 sprays into each nostril daily, Disp: , Rfl:   •  fluticasone-umeclidinium-vilanterol (Trelegy Ellipta) 100-62 5-25 MCG/INH inhaler, Inhale 1 puff daily Rinse mouth after use , Disp: , Rfl:   •  furosemide (LASIX) 20 mg tablet, TAKE 1 TABLET DAILY, Disp: 90 tablet, Rfl: 3  •  levETIRAcetam (KEPPRA) 250 mg tablet, TAKE 1 TABLET TWICE A DAY, Disp: 180 tablet, Rfl: 3  •  metoprolol succinate (TOPROL-XL) 25 mg 24 hr tablet, Take 1 tablet (25 mg total) by mouth daily, Disp: 90 tablet, Rfl: 3  •  pantoprazole (PROTONIX) 40 mg tablet, Take 1 tablet (40 mg total) by mouth daily, Disp: 90 tablet, Rfl: 1  •  Polyethylene Glycol 3350 (MIRALAX PO), Take by mouth as needed  , Disp: , Rfl:   •  simvastatin (ZOCOR) 40 mg tablet, Take 1 tablet (40 mg total) by mouth daily, Disp: 90 tablet, Rfl: 1  •  tamsulosin (FLOMAX) 0 4 mg, Take 0 4 mg by mouth daily, Disp: , Rfl:   Allergies   Allergen Reactions   • Penicillamine Rash   • Penicillins Rash     Rash on hands   • Morphine Other (See Comments)     Low blood pressure       Labs:  No visits with results within 2 Month(s) from this visit     Latest known visit with results is:   Appointment on 05/27/2022   Component Date Value   • WBC 05/27/2022 7 25    • RBC 05/27/2022 3 53 (A)   • Hemoglobin 05/27/2022 11 5 (A)   • Hematocrit 05/27/2022 35 4 (A)   • MCV 05/27/2022 100 (A)   • MCH 05/27/2022 32 6    • MCHC 05/27/2022 32 5    • RDW 05/27/2022 14 3    • MPV 05/27/2022 9 4    • Platelets 07/65/5014 214    • nRBC 05/27/2022 0    • Neutrophils Relative 05/27/2022 66    • Immat GRANS % 05/27/2022 0    • Lymphocytes Relative 05/27/2022 21    • Monocytes Relative 05/27/2022 7    • Eosinophils Relative 05/27/2022 6    • Basophils Relative 05/27/2022 0    • Neutrophils Absolute 05/27/2022 4 74    • Immature Grans Absolute 05/27/2022 0 02    • Lymphocytes Absolute 05/27/2022 1 51    • Monocytes Absolute 05/27/2022 0 54    • Eosinophils Absolute 05/27/2022 0 41    • Basophils Absolute 05/27/2022 0 03    • Sodium 05/27/2022 134 (A)   • Potassium 05/27/2022 4 7    • Chloride 05/27/2022 98 (A)   • CO2 05/27/2022 25    • ANION GAP 05/27/2022 11    • BUN 05/27/2022 33 (A)   • Creatinine 05/27/2022 1 82 (A)   • Glucose, Fasting 05/27/2022 103 (A)   • Calcium 05/27/2022 9 0    • AST 05/27/2022 15    • ALT 05/27/2022 17    • Alkaline Phosphatase 05/27/2022 58    • Total Protein 05/27/2022 7 7    • Albumin 05/27/2022 4 0    • Total Bilirubin 05/27/2022 0 53    • eGFR 05/27/2022 31    • Vit D, 25-Hydroxy 05/27/2022 52 7    • Cholesterol 05/27/2022 163    • Triglycerides 05/27/2022 67    • HDL, Direct 05/27/2022 89    • LDL Calculated 05/27/2022 61 • Non-HDL-Chol (CHOL-HDL) 05/27/2022 74      Imaging: No results found  Review of Systems:  Review of Systems Constitutional:  No fever or chills  Cardiac:  No chest pain, shortness of breath  Respiratory:    No coughing, hemoptysis, wheezing  Abdominal:  No nausea, vomiting, abdominal discomfort  urinary:  No hematuria  Extremities:  No swelling nor edema  Physical Exam:  Physical Exam HEENT:  Unremarkable  No JVD  Lungs:  Clear  Cardiac:  Regular rate and rhythm with 2/6 systolic murmur, no rubs nor gallops  /  Abdomen:  Soft, nontender, no rebound, normal bowel sounds  Extremities:  No clubbing cyanosis nor edema  Neuro:  Grossly nonfocal   Psych:  Alert and oriented x3      Chronic heart failure reduced ejection fraction  CAD status post CABG  Mitral regurgitation  Primary hypertension  Mixed hyperlipidemia  CKD    Discussion/Summary:  He is doing great, seems asymptomatic from a cardiovascular standpoint  No s/s of chf      He has declined more invasive therapies therefore no need to check an echocardiogram for the mitral regurgitation and ejection fraction  “the mitral regurgitation has been there a long time doc”  Last creatinine stable at 1 8 and he's getting labs next month  No Ace or Arb due to renal insufficiency  BP and lipids are well controlled  Continue his cardiac meds aspirin, amlodipine, Lasix, metoprolol and simvastatin  We discussed s/s of chf and acs, daily weights, salt avoidance

## 2022-12-05 ENCOUNTER — RA CDI HCC (OUTPATIENT)
Dept: OTHER | Facility: HOSPITAL | Age: 87
End: 2022-12-05

## 2022-12-05 ENCOUNTER — TELEPHONE (OUTPATIENT)
Dept: CARDIOLOGY CLINIC | Facility: CLINIC | Age: 87
End: 2022-12-05

## 2022-12-05 NOTE — PROGRESS NOTES
[facility] [address]  coding opportunities       Chart reviewed, no opportunity found: CHART REVIEWED, NO OPPORTUNITY FOUND        Patients Insurance     Medicare Insurance: Medicare

## 2022-12-05 NOTE — TELEPHONE ENCOUNTER
Patients daughter Christen Reyes called the office today stating that her father has gained 4 lbs in a week  She states that he is moving around but needs breaks often while walking  She stated he is on Lasix and would like to know if she should up his Lasix or what she should do   She stated he was seen last January for CHF and is concerned it could be due to that again    Patient's daughter requests a call back  [phone]

## 2022-12-06 ENCOUNTER — APPOINTMENT (OUTPATIENT)
Dept: LAB | Facility: HOSPITAL | Age: 87
End: 2022-12-06

## 2022-12-06 DIAGNOSIS — I13.0 HYPERTENSIVE HEART AND KIDNEY DISEASE WITH CHRONIC SYSTOLIC CONGESTIVE HEART FAILURE AND STAGE 3B CHRONIC KIDNEY DISEASE (HCC): Chronic | ICD-10-CM

## 2022-12-06 DIAGNOSIS — I50.22 HYPERTENSIVE HEART AND KIDNEY DISEASE WITH CHRONIC SYSTOLIC CONGESTIVE HEART FAILURE AND STAGE 3B CHRONIC KIDNEY DISEASE (HCC): Chronic | ICD-10-CM

## 2022-12-06 DIAGNOSIS — N18.32 HYPERTENSIVE HEART AND KIDNEY DISEASE WITH CHRONIC SYSTOLIC CONGESTIVE HEART FAILURE AND STAGE 3B CHRONIC KIDNEY DISEASE (HCC): Chronic | ICD-10-CM

## 2022-12-06 DIAGNOSIS — E78.2 MIXED HYPERLIPIDEMIA: ICD-10-CM

## 2022-12-06 LAB
ANION GAP SERPL CALCULATED.3IONS-SCNC: 11 MMOL/L (ref 4–13)
BUN SERPL-MCNC: 32 MG/DL (ref 5–25)
CALCIUM SERPL-MCNC: 9.2 MG/DL (ref 8.3–10.1)
CHLORIDE SERPL-SCNC: 100 MMOL/L (ref 96–108)
CHOLEST SERPL-MCNC: 154 MG/DL
CO2 SERPL-SCNC: 23 MMOL/L (ref 21–32)
CREAT SERPL-MCNC: 1.66 MG/DL (ref 0.6–1.3)
ERYTHROCYTE [DISTWIDTH] IN BLOOD BY AUTOMATED COUNT: 13.1 % (ref 11.6–15.1)
GFR SERPL CREATININE-BSD FRML MDRD: 34 ML/MIN/1.73SQ M
GLUCOSE P FAST SERPL-MCNC: 108 MG/DL (ref 65–99)
HCT VFR BLD AUTO: 35.3 % (ref 36.5–49.3)
HDLC SERPL-MCNC: 91 MG/DL
HGB BLD-MCNC: 11.5 G/DL (ref 12–17)
LDLC SERPL CALC-MCNC: 51 MG/DL (ref 0–100)
MCH RBC QN AUTO: 33.7 PG (ref 26.8–34.3)
MCHC RBC AUTO-ENTMCNC: 32.6 G/DL (ref 31.4–37.4)
MCV RBC AUTO: 104 FL (ref 82–98)
NONHDLC SERPL-MCNC: 63 MG/DL
PLATELET # BLD AUTO: 222 THOUSANDS/UL (ref 149–390)
PMV BLD AUTO: 9.2 FL (ref 8.9–12.7)
POTASSIUM SERPL-SCNC: 4.3 MMOL/L (ref 3.5–5.3)
RBC # BLD AUTO: 3.41 MILLION/UL (ref 3.88–5.62)
SODIUM SERPL-SCNC: 134 MMOL/L (ref 135–147)
TRIGL SERPL-MCNC: 61 MG/DL
WBC # BLD AUTO: 6.57 THOUSAND/UL (ref 4.31–10.16)

## 2022-12-06 NOTE — TELEPHONE ENCOUNTER
Spoke to patient's daughter Monae Rodríguez  Provided Michelle Ramirez advice   Daughter verbalized understanding

## 2022-12-13 ENCOUNTER — OFFICE VISIT (OUTPATIENT)
Dept: INTERNAL MEDICINE CLINIC | Facility: CLINIC | Age: 87
End: 2022-12-13

## 2022-12-13 VITALS
WEIGHT: 179 LBS | DIASTOLIC BLOOD PRESSURE: 60 MMHG | HEART RATE: 64 BPM | BODY MASS INDEX: 31.71 KG/M2 | SYSTOLIC BLOOD PRESSURE: 116 MMHG

## 2022-12-13 DIAGNOSIS — I50.22 HYPERTENSIVE HEART AND KIDNEY DISEASE WITH CHRONIC SYSTOLIC CONGESTIVE HEART FAILURE AND STAGE 3B CHRONIC KIDNEY DISEASE (HCC): Primary | Chronic | ICD-10-CM

## 2022-12-13 DIAGNOSIS — I13.0 HYPERTENSIVE HEART AND KIDNEY DISEASE WITH CHRONIC SYSTOLIC CONGESTIVE HEART FAILURE AND STAGE 3B CHRONIC KIDNEY DISEASE (HCC): Primary | Chronic | ICD-10-CM

## 2022-12-13 DIAGNOSIS — E78.2 MIXED HYPERLIPIDEMIA: ICD-10-CM

## 2022-12-13 DIAGNOSIS — Z23 ENCOUNTER FOR IMMUNIZATION: ICD-10-CM

## 2022-12-13 DIAGNOSIS — N18.32 HYPERTENSIVE HEART AND KIDNEY DISEASE WITH CHRONIC SYSTOLIC CONGESTIVE HEART FAILURE AND STAGE 3B CHRONIC KIDNEY DISEASE (HCC): Primary | Chronic | ICD-10-CM

## 2022-12-13 DIAGNOSIS — Z00.00 MEDICARE ANNUAL WELLNESS VISIT, SUBSEQUENT: ICD-10-CM

## 2022-12-13 NOTE — PATIENT INSTRUCTIONS
Medicare Preventive Visit Patient Instructions  Thank you for completing your Welcome to Medicare Visit or Medicare Annual Wellness Visit today  Your next wellness visit will be due in one year (12/14/2023)  The screening/preventive services that you may require over the next 5-10 years are detailed below  Some tests may not apply to you based off risk factors and/or age  Screening tests ordered at today's visit but not completed yet may show as past due  Also, please note that scanned in results may not display below  Preventive Screenings:  Service Recommendations Previous Testing/Comments   Colorectal Cancer Screening  Colonoscopy    Fecal Occult Blood Test (FOBT)/Fecal Immunochemical Test (FIT)  Fecal DNA/Cologuard Test  Flexible Sigmoidoscopy Age: 39-70 years old   Colonoscopy: every 10 years (May be performed more frequently if at higher risk)  OR  FOBT/FIT: every 1 year  OR  Cologuard: every 3 years  OR  Sigmoidoscopy: every 5 years  Screening may be recommended earlier than age 39 if at higher risk for colorectal cancer  Also, an individualized decision between you and your healthcare provider will decide whether screening between the ages of 74-80 would be appropriate   Colonoscopy: 08/01/2009  FOBT/FIT: Not on file  Cologuard: Not on file  Sigmoidoscopy: Not on file    Screening Not Indicated     Prostate Cancer Screening Individualized decision between patient and health care provider in men between ages of 53-78   Medicare will cover every 12 months beginning on the day after your 50th birthday PSA: 1 0 ng/mL     Screening Not Indicated     Hepatitis C Screening Once for adults born between 1945 and 1965  More frequently in patients at high risk for Hepatitis C Hep C Antibody: Not on file    Screening Not Indicated   Diabetes Screening 1-2 times per year if you're at risk for diabetes or have pre-diabetes Fasting glucose: 108 mg/dL (12/6/2022)  A1C: 6 2 % (9/29/2021)  Screening Current   Cholesterol Screening Once every 5 years if you don't have a lipid disorder  May order more often based on risk factors  Lipid panel: 12/06/2022  Screening Not Indicated  History Lipid Disorder      Other Preventive Screenings Covered by Medicare:  Abdominal Aortic Aneurysm (AAA) Screening: covered once if your at risk  You're considered to be at risk if you have a family history of AAA or a male between the age of 73-68 who smoking at least 100 cigarettes in your lifetime  Lung Cancer Screening: covers low dose CT scan once per year if you meet all of the following conditions: (1) Age 50-69; (2) No signs or symptoms of lung cancer; (3) Current smoker or have quit smoking within the last 15 years; (4) You have a tobacco smoking history of at least 20 pack years (packs per day x number of years you smoked); (5) You get a written order from a healthcare provider  Glaucoma Screening: covered annually if you're considered high risk: (1) You have diabetes OR (2) Family history of glaucoma OR (3)  aged 48 and older OR (3)  American aged 72 and older  Osteoporosis Screening: covered every 2 years if you meet one of the following conditions: (1) Have a vertebral abnormality; (2) On glucocorticoid therapy for more than 3 months; (3) Have primary hyperparathyroidism; (4) On osteoporosis medications and need to assess response to drug therapy  HIV Screening: covered annually if you're between the age of 12-76  Also covered annually if you are younger than 13 and older than 72 with risk factors for HIV infection  For pregnant patients, it is covered up to 3 times per pregnancy      Immunizations:  Immunization Recommendations   Influenza Vaccine Annual influenza vaccination during flu season is recommended for all persons aged >= 6 months who do not have contraindications   Pneumococcal Vaccine   * Pneumococcal conjugate vaccine = PCV13 (Prevnar 13), PCV15 (Vaxneuvance), PCV20 (Prevnar 20)  * Pneumococcal polysaccharide vaccine = PPSV23 (Pneumovax) Adults 2364 years old: 1-3 doses may be recommended based on certain risk factors  Adults 72 years old: 1-2 doses may be recommended based off what pneumonia vaccine you previously received   Hepatitis B Vaccine 3 dose series if at intermediate or high risk (ex: diabetes, end stage renal disease, liver disease)   Tetanus (Td) Vaccine - COST NOT COVERED BY MEDICARE PART B Following completion of primary series, a booster dose should be given every 10 years to maintain immunity against tetanus  Td may also be given as tetanus wound prophylaxis  Tdap Vaccine - COST NOT COVERED BY MEDICARE PART B Recommended at least once for all adults  For pregnant patients, recommended with each pregnancy  Shingles Vaccine (Shingrix) - COST NOT COVERED BY MEDICARE PART B  2 shot series recommended in those aged 48 and above     Health Maintenance Due:  There are no preventive care reminders to display for this patient  Immunizations Due:      Topic Date Due    Hepatitis B Vaccine (1 of 3 - 3-dose series) Never done    COVID-19 Vaccine (4 - Booster for Moderna series) 05/24/2022    Influenza Vaccine (1) 09/01/2022     Advance Directives   What are advance directives? Advance directives are legal documents that state your wishes and plans for medical care  These plans are made ahead of time in case you lose your ability to make decisions for yourself  Advance directives can apply to any medical decision, such as the treatments you want, and if you want to donate organs  What are the types of advance directives? There are many types of advance directives, and each state has rules about how to use them  You may choose a combination of any of the following:  Living will: This is a written record of the treatment you want  You can also choose which treatments you do not want, which to limit, and which to stop at a certain time   This includes surgery, medicine, IV fluid, and tube feedings  Durable power of  for healthcare [facility]): This is a written record that states who you want to make healthcare choices for you when you are unable to make them for yourself  This person, called a proxy, is usually a family member or a friend  You may choose more than 1 proxy  Do not resuscitate (DNR) order:  A DNR order is used in case your heart stops beating or you stop breathing  It is a request not to have certain forms of treatment, such as CPR  A DNR order may be included in other types of advance directives  Medical directive: This covers the care that you want if you are in a coma, near death, or unable to make decisions for yourself  You can list the treatments you want for each condition  Treatment may include pain medicine, surgery, blood transfusions, dialysis, IV or tube feedings, and a ventilator (breathing machine)  Values history: This document has questions about your views, beliefs, and how you feel and think about life  This information can help others choose the care that you would choose  Why are advance directives important? An advance directive helps you control your care  Although spoken wishes may be used, it is better to have your wishes written down  Spoken wishes can be misunderstood, or not followed  Treatments may be given even if you do not want them  An advance directive may make it easier for your family to make difficult choices about your care  How to Quit Using Smokeless Tobacco   Why it is important to stop using smokeless tobacco:  Smokeless tobacco comes in many forms  Examples include chew, snuff, dip, dissolvable tobacco, and snus  All smokeless tobacco products contain nicotine and may contain as much nicotine as 3 cigarettes  You may be physically dependent on nicotine  You may also be emotionally addicted to it   The cravings can be strong, but it is important to quit using smokeless tobacco  You will improve your health and decrease your cancer, stroke, and heart attack risk  Mouth sores and tooth problems will also improve when you quit  You can benefit from quitting no matter how long you have used smokeless tobacco    Prepare to stop using smokeless tobacco:  Nicotine is a highly addictive drug  Withdrawal symptoms can happen when you stop and make it hard to quit  The following can help keep you on track:  Set a quit date  Tell friends, family, and coworkers that you plan to quit  Remove all smokeless tobacco products from your home, car, and workplace  Manage weight gain after you quit:  Nicotine can affect your metabolism  You may gain a few pounds after you quit  The following can help you control your weight:  Eat healthy foods  Drink water before, during, and between meals  Exercise as directed  Weight Management   Why it is important to manage your weight:  Being overweight increases your risk of health conditions such as heart disease, high blood pressure, type 2 diabetes, and certain types of cancer  It can also increase your risk for osteoarthritis, sleep apnea, and other respiratory problems  Aim for a slow, steady weight loss  Even a small amount of weight loss can lower your risk of health problems  How to lose weight safely:  A safe and healthy way to lose weight is to eat fewer calories and get regular exercise  You can lose up about 1 pound a week by decreasing the number of calories you eat by 500 calories each day  Healthy meal plan for weight management:  A healthy meal plan includes a variety of foods, contains fewer calories, and helps you stay healthy  A healthy meal plan includes the following:  Eat whole-grain foods more often  A healthy meal plan should contain fiber  Fiber is the part of grains, fruits, and vegetables that is not broken down by your body  Whole-grain foods are healthy and provide extra fiber in your diet   Some examples of whole-grain foods are whole-wheat breads and pastas, oatmeal, brown rice, and bulgur  Eat a variety of vegetables every day  Include dark, leafy greens such as spinach, kale, michael greens, and mustard greens  Eat yellow and orange vegetables such as carrots, sweet potatoes, and winter squash  Eat a variety of fruits every day  Choose fresh or canned fruit (canned in its own juice or light syrup) instead of juice  Fruit juice has very little or no fiber  Eat low-fat dairy foods  Drink fat-free (skim) milk or 1% milk  Eat fat-free yogurt and low-fat cottage cheese  Try low-fat cheeses such as mozzarella and other reduced-fat cheeses  Choose meat and other protein foods that are low in fat  Choose beans or other legumes such as split peas or lentils  Choose fish, skinless poultry (chicken or turkey), or lean cuts of red meat (beef or pork)  Before you cook meat or poultry, cut off any visible fat  Use less fat and oil  Try baking foods instead of frying them  Add less fat, such as margarine, sour cream, regular salad dressing and mayonnaise to foods  Eat fewer high-fat foods  Some examples of high-fat foods include french fries, doughnuts, ice cream, and cakes  Eat fewer sweets  Limit foods and drinks that are high in sugar  This includes candy, cookies, regular soda, and sweetened drinks  Exercise:  Exercise at least 30 minutes per day on most days of the week  Some examples of exercise include walking, biking, dancing, and swimming  You can also fit in more physical activity by taking the stairs instead of the elevator or parking farther away from stores  Ask your healthcare provider about the best exercise plan for you  Alcohol Use and Your Health    Drinking too much can harm your health  Excessive alcohol use leads to about 88,000 death in the [place] each year, and shortens the life of those who diet by almost 30 years  Further, excessive drinking cost the economy $249 billion in 2010    Most excessive drinkers are not alcohol dependent  Excessive alcohol use has immediate effects that increase the risk of many harmful health conditions  These are most often the result of binge drinking  Over time, excessive alcohol use can lead to the development of chronic diseases and other series health problems  What is considered a "drink"? Excessive alcohol use includes:  Binge Drinking: For women, 4 or more drinks consumed on one occasion  For men, 5 or more drinks consumed on one occasion  Heavy Drinking: For women, 8 or more drinks per week  For men, 15 or more drinks per week  Any alcohol used by pregnant women  Any alcohol used by those under the age of 21 years    If you choose to drink, do so in moderation:  Do not drink at all if you are under the age of 24, or if you are or may be pregnant, or have health problems that could be made worse by drinking    For women, up to 1 drink per day  For men, up to 2 drinks a day    No one should begin drinking or drink more frequently based on potential health benefits    Short-Term Health Risks:  Injuries: motor vehicle crashes, falls, drownings, burns  Violence: homicide, suicide, sexual assault, intimate partner violence  Alcohol poisoning  Reproductive health: risky sexual behaviors, unintended prengnacy, sexually transmitted diseases, miscarriage, stillbirth, fetal alcohol syndrome    Long-Term Health Risks:  Chronic diseases: high blood pressure, heart disease, stroke, liver disease, digestive problems  Cancers: breast, mouth and throat, liver, colon  Learning and memory problems: dementia, poor school performance  Mental health: depression, anxiety, insomnia  Social problems: lost productivity, family problems, unemployment  Alcohol dependence    For support and more information:  Substance Abuse and [address] [address]  Web Address: https://[employer]/    Alcoholics Anonymous        Web Address: http://www kerns info/    https://www cdc gov/alcohol/fact-sheets/alcohol-use htm   © Copyright [facility] [facility] 2018 Information is for End User's use only and may not be sold, redistributed or otherwise used for commercial purposes   All illustrations and images included in CareNotes® are the copyrighted property of A D A M , Inc  or [address] [place] [address]

## 2022-12-13 NOTE — PROGRESS NOTES
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 ([facility] [address] )    Blood pressure is controlled  Creatinine stable around 1 6-1 8  His weight has been going up and asked him to increase Lasix to twice daily for the next 3 days  Continue to monitor weight at home  Watch alcohol intake  Watch salt in the diet  - CBC; Future  - Basic metabolic panel; Future    2  Mixed hyperlipidemia    Cholesterol is controlled  Continue simvastatin as prescribed  - Lipid panel; Future    3  Medicare annual wellness visit, subsequent    4  Encounter for immunization  - influenza vaccine, high-dose, PF 0 7 mL (FLUZONE HIGH-DOSE)     Depression Screening and Follow-up Plan: Patient was screened for depression during today's encounter  They screened negative with a PHQ-2 score of 0  Preventive health issues were discussed with patient, and age appropriate screening tests were ordered as noted in patient's After Visit Summary  Personalized health advice and appropriate referrals for health education or preventive services given if needed, as noted in patient's After Visit Summary  History of Present Illness:     Patient presents for a Medicare Wellness Visit    Patient is for follow-up  He is doing well  He is just a little concerned that his weight has been going up with his history of congestive heart failure  Feel like his diet has changed too much  He drinks alcohol on a nightly basis usually 1-2 drinks  Taking furosemide 20 mg as prescribed  Denies any leg swelling, shortness of breath, orthopnea, PND  Labs are stable  Creatinine is a little bit better than it has been in the past 2 times  Patient Care Team:  Chavo Costa DO as PCP - General  Popeye Schmidt MD (Dermatology)     Review of Systems:     Review of Systems   Constitutional: Positive for unexpected weight change (wt gain)   Negative for chills, fatigue and fever  Respiratory: Negative  Cardiovascular: Negative  Gastrointestinal: Negative  Musculoskeletal: Positive for arthralgias and gait problem  Medicare Screening Tests and Risk Assessments:     Maggie Kenyon is here for his Subsequent Wellness visit  Last Medicare Wellness visit information reviewed, patient interviewed and updates made to the record today  Health Risk Assessment:   Patient rates overall health as good  Patient feels that their physical health rating is slightly better  Patient is very satisfied with their life  Eyesight was rated as same  Hearing was rated as slightly worse  Patient feels that their emotional and mental health rating is same  Patients states they are never, rarely angry  Patient states they are sometimes unusually tired/fatigued  Pain experienced in the last 7 days has been none  Patient states that he has experienced no weight loss or gain in last 6 months  Fall Risk Screening: In the past year, patient has experienced: no history of falling in past year      Home Safety:  Patient does not have trouble with stairs inside or outside of their home  Patient has working smoke alarms and has no working carbon monoxide detector  Home safety hazards include: none  Nutrition:   Current diet is Regular and Limited junk food  Medications:   Patient is currently taking over-the-counter supplements  OTC medications include: see medication list  Patient is not able to manage medications  Activities of Daily Living (ADLs)/Instrumental Activities of Daily Living (IADLs):   Walk and transfer into and out of bed and chair?: Yes  Dress and groom yourself?: Yes    Bathe or shower yourself?: No    Feed yourself?  No  Do your laundry/housekeeping?: No  Manage your money, pay your bills and track your expenses?: No  Make your own meals?: No    Do your own shopping?: No    Durable Medical Equipment Suppliers  melanie    Previous Hospitalizations:   Any hospitalizations or ED visits within the last 12 months?: No      Advance Care Planning:   Living will: Yes    Durable POA for healthcare: Yes    Advanced directive: Yes    Five wishes given: No      Cognitive Screening:   Provider or family/friend/caregiver concerned regarding cognition?: No    PREVENTIVE SCREENINGS      Cardiovascular Screening:    General: Screening Not Indicated and History Lipid Disorder      Diabetes Screening:     General: Screening Current      Colorectal Cancer Screening:     General: Screening Not Indicated      Prostate Cancer Screening:    General: Screening Not Indicated      Osteoporosis Screening:    General: Screening Not Indicated      Abdominal Aortic Aneurysm (AAA) Screening:    Risk factors include: tobacco use        General: Screening Not Indicated      Lung Cancer Screening:     General: Screening Not Indicated      Hepatitis C Screening:    General: Screening Not Indicated    Screening, Brief Intervention, and Referral to Treatment (SBIRT)    Screening  Typical number of drinks in a day: 2  Typical number of drinks in a week: 14  Interpretation: Low risk drinking behavior  AUDIT-C Screenin) How often did you have a drink containing alcohol in the past year? 4 or more times a week  2) How many drinks did you have on a typical day when you were drinking in the past year? 1 to 2  3) How often did you have 6 or more drinks on one occasion in the past year? never    AUDIT-C Score: 4  Interpretation: Score 4-12 (male): POSITIVE screen for alcohol misuse    AUDIT Screenin) How often during the last year have you found that you were not able to stop drinking once you had started? 0 - never  5) How often during the last year have you failed to do what was normally expected from you because of drinking? 0 - never  6) How often during the last year have you needed a first drink in the morning to get yourself going after a heavy drinking session?  0 - never  7) How often during the last year have you had a feeling of guilt or remorse after drinking? 0 - never  8) How often during the last year have you been unable to remember what happened the night before because you had been drinking? 0 - never  9) Have you or someone else been injured as a result of your drinking? 0 - no  10) Has a relative or friend or a doctor or another health worker been concerned about your drinking or suggested you cut down? 0 - no    AUDIT Score: 4  Interpretation: Low risk alcohol consumption    Single Item Drug Screening:  How often have you used an illegal drug (including marijuana) or a prescription medication for non-medical reasons in the past year? never    Single Item Drug Screen Score: 0  Interpretation: Negative screen for possible drug use disorder    Brief Intervention  Alcohol & drug use screenings were reviewed  No concerns regarding substance use disorder identified  Other Counseling Topics:   Car/seat belt/driving safety, skin self-exam, sunscreen and regular weightbearing exercise  Social Determinants of Health     Tobacco Use: High Risk   • Smoking Tobacco Use: Former   • Smokeless Tobacco Use: Current   • Passive Exposure: Not on file   Alcohol Use: Not At Risk   • Frequency of Alcohol Consumption: 4 or more times a week   • Average Number of Drinks: 1 or 2   • Frequency of Binge Drinking: Never   Financial Resource Strain: Low Risk    • Difficulty of Paying Living Expenses: Not very hard   Food Insecurity: No Food Insecurity   • Worried About Running Out of Food in the Last Year: Never true   • Ran Out of Food in the Last Year: Never true   Transportation Needs: No Transportation Needs   • Lack of Transportation (Medical): No   • Lack of Transportation (Non-Medical):  No   Physical Activity: Not on file   Stress: Not on file   Social Connections: Not on file   Intimate Partner Violence: Not on file   Depression: Not at risk   • PHQ-2 Score: 0   Housing Stability: Low Risk    • Unable to Pay for Housing in the Last Year: No   • Number of Places Lived in the Last Year: 1   • Unstable Housing in the Last Year: No      Physical Exam:     /60   Pulse 64   Wt 81 2 kg (179 lb)   BMI 31 71 kg/m²     Physical Exam  Constitutional:       General: He is not in acute distress  Appearance: He is not ill-appearing  Cardiovascular:      Rate and Rhythm: Normal rate and regular rhythm  Heart sounds: No murmur heard  Pulmonary:      Effort: Pulmonary effort is normal  No respiratory distress  Breath sounds: No wheezing  Abdominal:      General: Bowel sounds are normal  There is no distension  Tenderness: There is no abdominal tenderness  Musculoskeletal:      Right lower leg: No edema  Left lower leg: No edema  Neurological:      Mental Status: He is alert        Gait: Gait abnormal         Ismael Riggs, DO

## 2023-01-23 DIAGNOSIS — E78.2 MIXED HYPERLIPIDEMIA: ICD-10-CM

## 2023-01-23 RX ORDER — SIMVASTATIN 40 MG
TABLET ORAL
Qty: 90 TABLET | Refills: 3 | Status: SHIPPED | OUTPATIENT
Start: 2023-01-23

## 2023-02-01 DIAGNOSIS — R05.3 CHRONIC COUGH: ICD-10-CM

## 2023-02-01 RX ORDER — PANTOPRAZOLE SODIUM 40 MG/1
TABLET, DELAYED RELEASE ORAL
Qty: 90 TABLET | Refills: 3 | Status: SHIPPED | OUTPATIENT
Start: 2023-02-01

## 2023-04-03 DIAGNOSIS — I10 ESSENTIAL HYPERTENSION: ICD-10-CM

## 2023-04-03 RX ORDER — METOPROLOL SUCCINATE 25 MG/1
TABLET, EXTENDED RELEASE ORAL
Qty: 90 TABLET | Refills: 3 | Status: SHIPPED | OUTPATIENT
Start: 2023-04-03

## 2023-04-05 ENCOUNTER — TELEPHONE (OUTPATIENT)
Age: 88
End: 2023-04-05

## 2023-04-05 NOTE — TELEPHONE ENCOUNTER
Getting teeth pulled in two weeks, was told by dentist to stop blood thinners 2 days prior, wants to make sure you agree with this    Call daughter # [phone]

## 2023-06-20 ENCOUNTER — RA CDI HCC (OUTPATIENT)
Dept: OTHER | Facility: HOSPITAL | Age: 88
End: 2023-06-20

## 2023-06-21 ENCOUNTER — APPOINTMENT (OUTPATIENT)
Dept: LAB | Facility: HOSPITAL | Age: 88
End: 2023-06-21
Attending: INTERNAL MEDICINE
Payer: MEDICARE

## 2023-06-21 DIAGNOSIS — I13.0 HYPERTENSIVE HEART AND KIDNEY DISEASE WITH CHRONIC SYSTOLIC CONGESTIVE HEART FAILURE AND STAGE 3B CHRONIC KIDNEY DISEASE (HCC): Chronic | ICD-10-CM

## 2023-06-21 DIAGNOSIS — E78.2 MIXED HYPERLIPIDEMIA: ICD-10-CM

## 2023-06-21 DIAGNOSIS — I50.22 HYPERTENSIVE HEART AND KIDNEY DISEASE WITH CHRONIC SYSTOLIC CONGESTIVE HEART FAILURE AND STAGE 3B CHRONIC KIDNEY DISEASE (HCC): Chronic | ICD-10-CM

## 2023-06-21 DIAGNOSIS — N18.32 HYPERTENSIVE HEART AND KIDNEY DISEASE WITH CHRONIC SYSTOLIC CONGESTIVE HEART FAILURE AND STAGE 3B CHRONIC KIDNEY DISEASE (HCC): Chronic | ICD-10-CM

## 2023-06-21 LAB
ANION GAP SERPL CALCULATED.3IONS-SCNC: 10 MMOL/L
BUN SERPL-MCNC: 24 MG/DL (ref 5–25)
CALCIUM SERPL-MCNC: 9.9 MG/DL (ref 8.4–10.2)
CHLORIDE SERPL-SCNC: 97 MMOL/L (ref 96–108)
CHOLEST SERPL-MCNC: 173 MG/DL
CO2 SERPL-SCNC: 23 MMOL/L (ref 21–32)
CREAT SERPL-MCNC: 1.46 MG/DL (ref 0.6–1.3)
ERYTHROCYTE [DISTWIDTH] IN BLOOD BY AUTOMATED COUNT: 12.9 % (ref 11.6–15.1)
GFR SERPL CREATININE-BSD FRML MDRD: 40 ML/MIN/1.73SQ M
GLUCOSE P FAST SERPL-MCNC: 120 MG/DL (ref 65–99)
HCT VFR BLD AUTO: 36.5 % (ref 36.5–49.3)
HDLC SERPL-MCNC: 94 MG/DL
HGB BLD-MCNC: 12.5 G/DL (ref 12–17)
LDLC SERPL CALC-MCNC: 59 MG/DL (ref 0–100)
MCH RBC QN AUTO: 34.1 PG (ref 26.8–34.3)
MCHC RBC AUTO-ENTMCNC: 34.2 G/DL (ref 31.4–37.4)
MCV RBC AUTO: 100 FL (ref 82–98)
NONHDLC SERPL-MCNC: 79 MG/DL
PLATELET # BLD AUTO: 237 THOUSANDS/UL (ref 149–390)
PMV BLD AUTO: 8.9 FL (ref 8.9–12.7)
POTASSIUM SERPL-SCNC: 4.4 MMOL/L (ref 3.5–5.3)
RBC # BLD AUTO: 3.67 MILLION/UL (ref 3.88–5.62)
SODIUM SERPL-SCNC: 130 MMOL/L (ref 135–147)
TRIGL SERPL-MCNC: 102 MG/DL
WBC # BLD AUTO: 4.15 THOUSAND/UL (ref 4.31–10.16)

## 2023-06-21 PROCEDURE — 80048 BASIC METABOLIC PNL TOTAL CA: CPT

## 2023-06-21 PROCEDURE — 85027 COMPLETE CBC AUTOMATED: CPT

## 2023-06-21 PROCEDURE — 80061 LIPID PANEL: CPT

## 2023-06-21 PROCEDURE — 36415 COLL VENOUS BLD VENIPUNCTURE: CPT

## 2023-06-23 NOTE — PATIENT INSTRUCTIONS
Make appointment with neurology  Follow up with nephrology  Call the office if gout recurrs, may need maintenance medication    Recurrent Seizures in Adults   WHAT YOU NEED TO KNOW:   A seizure is an episode of abnormal brain activity  A seizure can cause jerky muscle movements, loss of consciousness, or confusion  Recurrent means you have a seizure more than once  The cause of your seizures may not be known  Recurrent seizures may occur if you do not take antiseizure medicine as directed  Some common triggers are alcohol, drugs, lack of sleep, fever, or a virus  High or low blood sugar levels can also trigger a seizure  DISCHARGE INSTRUCTIONS:   Call 911 or have someone else call for any of the following:   · Your seizure lasts longer than 5 minutes  · You have a second seizure within 24 hours of your first     · You have trouble breathing after a seizure  · You cannot be woken after your seizure  · You have more than 1 seizure before you are fully awake or aware  · You have diabetes or are pregnant and have a seizure  · You have a seizure in water  Return to the emergency department if:   · You are injured during a seizure  Contact your healthcare provider if:   · You have a fever  · You are planning to get pregnant or are currently pregnant  · You have questions or concerns about your condition or care  Medicine:   · Antiepileptic  medicine may be given to control or prevent seizures  Do not  stop taking this medicine without the direction of a healthcare provider  · Take your medicine as directed  Contact your healthcare provider if you think your medicine is not helping or if you have side effects  Tell him of her if you are allergic to any medicine  Keep a list of the medicines, vitamins, and herbs you take  Include the amounts, and when and why you take them  Bring the list or the pill bottles to follow-up visits   Carry your medicine list with you in case of an emergency  Prevent another seizure:   · Take your antiseizure medicine every day at the same time  This will also help reduce side effects  Do not skip any doses  Do not stop taking this medicine unless directed by a healthcare provider  · Manage stress  Stress can trigger a seizure  Exercise can help you reduce stress  Talk to your healthcare provider about exercise that is safe for you  Other ways to manage stress include yoga, meditation, and biofeedback  Illness can be a form of stress  Eat a variety of healthy foods and drink plenty of liquids during an illness  · Set a regular sleep schedule  A lack of sleep can trigger a seizure  Try to go to sleep and wake up at the same times every day  Keep your bedroom quiet and dark  Talk to your healthcare provider if you are having trouble sleeping  · Manage other medical conditions  Manage other health conditions that may increase your risk for a seizure  Keep your blood sugar levels and blood pressure under control  · Limit or do not drink alcohol as directed  Alcohol can trigger a seizure, especially if you drink a large amount at one time  A drink of alcohol is 12 ounces of beer, 1½ ounces of liquor, or 5 ounces of wine  Talk to your healthcare provider about a safe amount of alcohol for you  Your provider may recommend that you do not drink any alcohol  Tell him or her if you need help to quit drinking  Manage recurrent seizures:   · Ask what safety precautions you should take  Talk with your healthcare provider about driving  You may not be able to drive until you are seizure-free for a period of time  You will need to check the law where you live  Also talk to your healthcare provider about swimming and bathing  You may drown or develop life-threatening heart or lung damage if you have a seizure in water  · Tell your friends, family members, and coworkers that you had a seizure    Give them the following instructions to use if you have another seizure:     ¨ Do not panic  ¨ Gently guide me to the floor or a soft surface  ¨ Do not hold me down or put anything in my mouth  ¨ Place me on my side to help prevent me from swallowing saliva or vomit  ¨ Protect me from injury  Remove sharp or hard objects from the area surrounding me, or cushion my head  ¨ Loosen the clothing around my head and neck  ¨ Time how long my seizure lasts  Call 911 if my seizure lasts longer than 5 minutes or if I have a second seizure  ¨ Stay with me until my seizure ends  Let me rest until I am fully awake  ¨ Perform CPR if I stop breathing or you cannot feel my pulse  ¨ Do not give me anything to eat or drink until I am fully awake  Follow up with your healthcare provider or neurologist as directed: You may need more tests to find the cause of your seizure  You may also need tests to check the level of antiseizure medicine in your blood  Your neurologist may need to change or adjust your medicine  Write down your questions so you remember to ask them during your visits  © 2017 [employer] Information is for End User's use only and may not be sold, redistributed or otherwise used for commercial purposes  All illustrations and images included in CareNotes® are the copyrighted property of [employer]  or [employer]  The above information is an  only  It is not intended as medical advice for individual conditions or treatments  Talk to your doctor, nurse or pharmacist before following any medical regimen to see if it is safe and effective for you  Gout   WHAT YOU NEED TO KNOW:   Gout is a form of arthritis that causes severe joint pain, redness, swelling, and stiffness  Acute gout pain starts suddenly, gets worse quickly, and stops on its own  Acute gout can become chronic and cause permanent damage to the joints    DISCHARGE INSTRUCTIONS:   Return to the emergency department if: · You have severe pain in one or more of your joints that you cannot tolerate  · You have a fever or redness that spreads beyond the joint area  Contact your healthcare provider if:   · You have new symptoms, such as a rash, after you start gout treatment  · Your joint pain and swelling do not go away, even after treatment  · You are not urinating as much or as often as you usually do  · You have trouble taking your gout medicines  · You have questions or concerns about your condition or care  Medicines: You may need any of the following:  · Prescription pain medicine  may be given  Ask your healthcare provider how to take this medicine safely  Some prescription pain medicines contain acetaminophen  Do not take other medicines that contain acetaminophen without talking to your healthcare provider  Too much acetaminophen may cause liver damage  Prescription pain medicine may cause constipation  Ask your healthcare provider how to prevent or treat constipation  · NSAIDs , such as ibuprofen, help decrease swelling, pain, and fever  This medicine is available with or without a doctor's order  NSAIDs can cause stomach bleeding or kidney problems in certain people  If you take blood thinner medicine, always ask your healthcare provider if NSAIDs are safe for you  Always read the medicine label and follow directions  · Gout medicine  decreases joint pain and swelling  It may also be given to prevent new gout attacks  · Steroids  reduce inflammation and can help your joint stiffness and pain during gout attacks  · Uric acid medicine  may be given to reduce the amount of uric acid your body makes  Some medicines may help you pass more uric acid when you urinate  · Take your medicine as directed  Contact your healthcare provider if you think your medicine is not helping or if you have side effects  Tell him or her if you are allergic to any medicine   Keep a list of the medicines, vitamins, and herbs you take  Include the amounts, and when and why you take them  Bring the list or the pill bottles to follow-up visits  Carry your medicine list with you in case of an emergency  Follow up with your healthcare provider as directed:  Write down your questions so you remember to ask them during your visits  Manage gout:   · Rest your painful joint so it can heal   Your healthcare provider may recommend crutches or a walker if the affected joint is in a leg  · Apply ice to your joint  Ice decreases pain and swelling  Use an ice pack, or put crushed ice in a plastic bag  Cover the ice pack or bag with a towel before you apply it to your painful joint  Apply ice for 15 to 20 minutes every hour, or as directed  · Elevate your joint  Elevation helps reduce swelling and pain  Raise your joint above the level of your heart as often as you can  Prop your painful joint on pillows to keep it above your heart comfortably  · Go to physical therapy if directed  A physical therapist can teach you exercises to improve flexibility and range of motion  Prevent gout attacks:   · Do not eat high-purine foods  These foods include meats, seafood, asparagus, spinach, cauliflower, and some types of beans  Healthcare providers may tell you to eat more low-fat milk products, such as yogurt  Milk products may decrease your risk for gout attacks  Vitamin C and coffee may also help  Your healthcare provider or dietitian can help you create a meal plan  · Drink more liquids as directed  Liquids such as water help remove uric acid from your body  Ask how much liquid to drink each day and which liquids are best for you  · Manage your weight  Weight loss may decrease the amount of uric acid in your body  Exercise can help you lose weight  Talk to your healthcare provider about the best exercises for you  · Control your blood sugar level if you have diabetes    Keep your blood sugar level in a normal range  This can help prevent gout attacks  · Limit or do not drink alcohol as directed  Alcohol can trigger a gout attack  Alcohol also increases your risk for dehydration  Ask your healthcare provider if alcohol is safe for you  © 2017 [employer] Information is for End User's use only and may not be sold, redistributed or otherwise used for commercial purposes  All illustrations and images included in CareNotes® are the copyrighted property of [employer]  or [employer]  The above information is an  only  It is not intended as medical advice for individual conditions or treatments  Talk to your doctor, nurse or pharmacist before following any medical regimen to see if it is safe and effective for you  Xolair Counseling:  Patient informed of potential adverse effects including but not limited to fever, muscle aches, rash and allergic reactions.  The patient verbalized understanding of the proper use and possible adverse effects of Xolair.  All of the patient's questions and concerns were addressed.

## 2023-06-27 ENCOUNTER — OFFICE VISIT (OUTPATIENT)
Age: 88
End: 2023-06-27
Payer: MEDICARE

## 2023-06-27 VITALS
HEIGHT: 63 IN | WEIGHT: 172 LBS | DIASTOLIC BLOOD PRESSURE: 67 MMHG | BODY MASS INDEX: 30.48 KG/M2 | OXYGEN SATURATION: 99 % | SYSTOLIC BLOOD PRESSURE: 147 MMHG | TEMPERATURE: 98 F | HEART RATE: 70 BPM | RESPIRATION RATE: 20 BRPM

## 2023-06-27 DIAGNOSIS — I13.0 HYPERTENSIVE HEART AND KIDNEY DISEASE WITH CHRONIC SYSTOLIC CONGESTIVE HEART FAILURE AND STAGE 3B CHRONIC KIDNEY DISEASE (HCC): Primary | ICD-10-CM

## 2023-06-27 DIAGNOSIS — N18.32 HYPERTENSIVE HEART AND KIDNEY DISEASE WITH CHRONIC SYSTOLIC CONGESTIVE HEART FAILURE AND STAGE 3B CHRONIC KIDNEY DISEASE (HCC): Primary | ICD-10-CM

## 2023-06-27 DIAGNOSIS — I50.22 HYPERTENSIVE HEART AND KIDNEY DISEASE WITH CHRONIC SYSTOLIC CONGESTIVE HEART FAILURE AND STAGE 3B CHRONIC KIDNEY DISEASE (HCC): Primary | ICD-10-CM

## 2023-06-27 DIAGNOSIS — E78.2 MIXED HYPERLIPIDEMIA: ICD-10-CM

## 2023-06-27 DIAGNOSIS — R56.9 SEIZURE (HCC): ICD-10-CM

## 2023-06-27 DIAGNOSIS — J43.9 PULMONARY EMPHYSEMA, UNSPECIFIED EMPHYSEMA TYPE (HCC): ICD-10-CM

## 2023-06-27 PROBLEM — F03.90 DEMENTIA WITHOUT BEHAVIORAL DISTURBANCE, PSYCHOTIC DISTURBANCE, MOOD DISTURBANCE, OR ANXIETY, UNSPECIFIED DEMENTIA SEVERITY, UNSPECIFIED DEMENTIA TYPE (HCC): Status: ACTIVE | Noted: 2023-06-27

## 2023-06-27 PROBLEM — M10.9 ACUTE GOUT INVOLVING TOE OF LEFT FOOT: Status: RESOLVED | Noted: 2020-07-02 | Resolved: 2023-06-27

## 2023-06-27 PROCEDURE — 99214 OFFICE O/P EST MOD 30 MIN: CPT | Performed by: INTERNAL MEDICINE

## 2023-06-27 RX ORDER — AMLODIPINE BESYLATE 5 MG/1
5 TABLET ORAL DAILY
Qty: 90 TABLET | Refills: 3 | Status: SHIPPED | OUTPATIENT
Start: 2023-06-27

## 2023-06-27 RX ORDER — LEVETIRACETAM 250 MG/1
250 TABLET ORAL 2 TIMES DAILY
Qty: 180 TABLET | Refills: 3 | Status: SHIPPED | OUTPATIENT
Start: 2023-06-27

## 2023-06-27 RX ORDER — FUROSEMIDE 20 MG/1
20 TABLET ORAL DAILY
Qty: 90 TABLET | Refills: 3 | Status: SHIPPED | OUTPATIENT
Start: 2023-06-27

## 2023-06-27 NOTE — PROGRESS NOTES
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 ([place] [address] )        Lab Units 23  1019 22  0946 22  0946   CREATININE mg/dL 1 46* 1 66* 1 82*   EGFR ml/min/1 73sq m 40 34 31     Renal function is stable at this time  Continue current anti-hypertensive medications  Avoid excess salt  Avoid NSAIDs  Monitor weight/fluid balance  No change in diuretic dose  Follow-up with cardiology  - amLODIPine (NORVASC) 5 mg tablet; Take 1 tablet (5 mg total) by mouth daily  Dispense: 90 tablet; Refill: 3  - furosemide (LASIX) 20 mg tablet; Take 1 tablet (20 mg total) by mouth daily  Dispense: 90 tablet; Refill: 3  - CBC; Future  - Basic metabolic panel; Future  - PTH, intact; Future  - Vitamin D 25 hydroxy; Future  - Albumin / creatinine urine ratio; Future    2  Pulmonary emphysema, unspecified emphysema type ([place] [address] )    Stable  Continue inhalers  Former smoking history  3  Seizure ([place] [address] )    Stable and I suspect can probably stop keppra, but he does not want to see neurologist again at this time  - levETIRAcetam (KEPPRA) 250 mg tablet; Take 1 tablet (250 mg total) by mouth 2 (two) times a day  Dispense: 180 tablet; Refill: 3    4  Mixed hyperlipidemia    Stable  Continue statin  - Lipid panel; Future     BMI Counseling: Body mass index is 30 47 kg/m²  Follow-up plan was not completed due to elderly patient (72 years old) where weight reduction/weight gain would complicate underlying health condition such as: illness or physical disability and mental illness, dementia, or confusion  Depression Screening and Follow-up Plan: Patient was screened for depression during today's encounter   They screened negative with a PHQ-2 "score of 0  Return in about 6 months (around 12/14/2023) for Follow-up  Raheel Pan presents for follow-up  Overall health has been stable and he feels stable at his age  Was hospitalized in past with hyponatremia and rhabdomyolysis  Had seizure episode and was put on keppra  Question whether he had true epilepsy and may have have some contribution from alcohol withdrawal  Neurology wanted him to get sleep deprived EEG but he never went so has remained on low dose keppra without any recurrent events  No recent falls  Review of Systems   Respiratory: Negative  Cardiovascular: Negative  Gastrointestinal: Negative  Musculoskeletal: Positive for arthralgias and gait problem  Objective     /67 (BP Location: Left arm, Patient Position: Sitting, Cuff Size: Standard)   Pulse 70   Temp 98 °F (36 7 °C) (Tympanic)   Resp 20   Ht 5' 3\" (1 6 m)   Wt 78 kg (172 lb)   SpO2 99%   BMI 30 47 kg/m²     Physical Exam  Constitutional:       General: He is not in acute distress  Appearance: He is well-developed  He is not diaphoretic  Neck:      Thyroid: No thyromegaly  Vascular: No JVD  Cardiovascular:      Rate and Rhythm: Normal rate and regular rhythm  Heart sounds: Normal heart sounds  No murmur heard  Pulmonary:      Effort: Pulmonary effort is normal  No respiratory distress  Breath sounds: Normal breath sounds  No wheezing or rales  Abdominal:      General: Bowel sounds are normal  There is no distension  Palpations: Abdomen is soft  There is no mass  Tenderness: There is no abdominal tenderness  There is no guarding or rebound  Musculoskeletal:      Right lower leg: No edema  Left lower leg: No edema  Neurological:      Mental Status: He is alert  Mental status is at baseline        Gait: Gait abnormal        Ismael Riggs DO    "

## 2023-06-27 NOTE — PATIENT INSTRUCTIONS
Chronic Hypertension   AMBULATORY CARE:   Hypertension is considered chronic  when it continues for 3 months or longer  Hypertension that continues causes your heart to work much harder than normal, which may lead to heart damage  Even if you have hypertension for years, lifestyle changes, medicines, or both may help lower your blood pressure  Call your local emergency number [phone] in the [address] [facility]) or have someone call if:   You have chest pain  You have any of the following signs of a heart attack:      Squeezing, pressure, or pain in your chest    You may  also have any of the following:     Discomfort or pain in your back, neck, jaw, stomach, or arm    Shortness of breath    Nausea or vomiting    Lightheadedness or a sudden cold sweat    You become confused or have difficulty speaking  You suddenly feel lightheaded or have trouble breathing  Seek care immediately if:   You have a severe headache or vision loss  You have weakness in an arm or leg  Call your doctor or cardiologist if:   You feel faint, dizzy, confused, or drowsy  You have been taking your blood pressure medicine but your pressure is higher than your provider says it should be  You have questions or concerns about your condition or care  Treatment for chronic hypertension  may include medicine to lower your blood pressure and cholesterol levels  A low cholesterol level helps prevent heart disease and makes it easier to control your blood pressure  Heart disease can make your blood pressure harder to control  You may also need to make lifestyle changes  What you need to know about the stages of hypertension:  Your healthcare provider will give you a blood pressure goal based on your age, health, and risk for cardiovascular disease  The following are general guidelines on the stages of hypertension:  Normal blood pressure is 119/79 or lower    Your provider may only check your blood pressure each year if it stays at a normal level     Elevated blood pressure is 120/79 to 129/79   This is sometimes called prehypertension  Your provider may suggest lifestyle changes to help lower your blood pressure to a normal level  He or she may then check it again in 3 to 6 months  Stage 1 hypertension is 130/80  to 139/89   Your provider may recommend lifestyle changes, medication, and checks every 3 to 6 months until your blood pressure is controlled  Stage 2 hypertension is 140/90 or higher   Your provider will recommend lifestyle changes and have you take 2 kinds of hypertension medicines  You will also need to have your blood pressure checked monthly until it is controlled  Manage chronic hypertension:   Check your blood pressure at home  Do not smoke, have caffeine, or exercise for at least 30 minutes before you check your blood pressure  Sit and rest for 5 minutes before you check your blood pressure  Extend your arm and support it on a flat surface  Your arm should be at the same level as your heart  Follow the directions that came with your blood pressure monitor  Check your blood pressure 2 times, 1 minute apart, before you take your medicine in the morning  Also check your blood pressure before your evening meal  Keep a record of your readings and bring it to your follow-up visits  Your healthcare provider may use the readings to make changes to your treatment plan  Manage any other health conditions you have  Health conditions such as diabetes can increase your risk for hypertension  Follow your provider's instructions and take all your medicines as directed  Talk to your provider about any new health conditions you have recently developed  Ask about all medicines  Certain medicines can increase your blood pressure  Examples include oral birth control pills, decongestants, herbal supplements, and NSAIDs, such as ibuprofen  Your provider can tell you which medicines are safe for you to take   This includes prescription and over-the-counter medicines  Lifestyle changes you can make to lower your blood pressure: Your provider may want you to make more lifestyle changes if you are having trouble controlling your blood pressure  This may feel difficult over time, especially if you think you are making good changes but your pressure is still high  It might help to focus on one new change at a time  For example, try to add 1 more day of exercise, or exercise for an extra 10 minutes on 2 days  Small changes can make a big difference  Your healthcare provider can also refer you to specialists such as a dietitian who can help you make small changes  Your family members may be included in helping you learn to create lifestyle changes, such as the following:     Limit sodium (salt) as directed  Too much sodium can affect your fluid balance  Check labels to find low-sodium or no-salt-added foods  Some low-sodium foods use potassium salts for flavor  Too much potassium can also cause health problems  Your provider will tell you how much sodium and potassium are safe for you to have in a day  He or she may recommend that you limit sodium to 2,300 mg a day  Follow the meal plan recommended by your provider  A dietitian or your provider can give you more information on low-sodium plans or the DASH (Dietary Approaches to Stop Hypertension) eating plan  The DASH plan is low in sodium, processed sugar, unhealthy fats, and total fat  It is high in potassium, calcium, and fiber  These can be found in vegetables, fruit, and whole-grain foods  Be physically active throughout the day  Physical activity, such as exercise, can help control your blood pressure and your weight  Be physically active for at least 30 minutes per day, on most days of the week  Include aerobic activity, such as walking or riding a bicycle  Also include strength training at least 2 times each week   Your provider can help you create a physical activity plan  Decrease stress  This may help lower your blood pressure  Learn ways to relax, such as deep breathing or listening to music  Limit alcohol as directed  Alcohol can increase your blood pressure  A drink of alcohol is 12 ounces of beer, 5 ounces of wine, or 1½ ounces of liquor  Your provider can help you set daily and weekly drink limits  He or she may recommend no alcohol if your blood pressure stays higher than goal even with medicine or other measures  Ask your provider for information if you need help to quit  Do not smoke  Nicotine and other chemicals in cigarettes and cigars can increase your blood pressure and also cause lung damage  Ask your provider for information if you currently smoke and need help to quit  E-cigarettes or smokeless tobacco still contain nicotine  Talk to your provider before you use these products  Follow up with your doctor or cardiologist as directed: You will need to return to have your blood pressure checked and to have other lab tests done  Write down your questions so you remember to ask them during your visits  © Copyright [employer] 2022 Information is for End User's use only and may not be sold, redistributed or otherwise used for commercial purposes  The above information is an  only  It is not intended as medical advice for individual conditions or treatments  Talk to your doctor, nurse or pharmacist before following any medical regimen to see if it is safe and effective for you

## 2023-07-24 DIAGNOSIS — N18.32 HYPERTENSIVE HEART AND KIDNEY DISEASE WITH CHRONIC SYSTOLIC CONGESTIVE HEART FAILURE AND STAGE 3B CHRONIC KIDNEY DISEASE (HCC): ICD-10-CM

## 2023-07-24 DIAGNOSIS — I13.0 HYPERTENSIVE HEART AND KIDNEY DISEASE WITH CHRONIC SYSTOLIC CONGESTIVE HEART FAILURE AND STAGE 3B CHRONIC KIDNEY DISEASE (HCC): ICD-10-CM

## 2023-07-24 DIAGNOSIS — I50.22 HYPERTENSIVE HEART AND KIDNEY DISEASE WITH CHRONIC SYSTOLIC CONGESTIVE HEART FAILURE AND STAGE 3B CHRONIC KIDNEY DISEASE (HCC): ICD-10-CM

## 2023-07-24 RX ORDER — FUROSEMIDE 20 MG/1
20 TABLET ORAL DAILY
Qty: 90 TABLET | Refills: 3 | Status: SHIPPED | OUTPATIENT
Start: 2023-07-24

## 2023-07-24 NOTE — TELEPHONE ENCOUNTER
Name of [address] [place] [address] patient's daughter     Call back Ridge Taylor    Medication(s):Furosemide  Are we prescribing provider?:    30 or 90 day supply:90    Pharmacy name/number:[facility]    Last or Next appt?:September

## 2023-09-27 ENCOUNTER — OFFICE VISIT (OUTPATIENT)
Dept: CARDIOLOGY CLINIC | Facility: CLINIC | Age: 88
End: 2023-09-27
Payer: MEDICARE

## 2023-09-27 VITALS
RESPIRATION RATE: 17 BRPM | HEART RATE: 75 BPM | DIASTOLIC BLOOD PRESSURE: 88 MMHG | WEIGHT: 172 LBS | HEIGHT: 63 IN | SYSTOLIC BLOOD PRESSURE: 138 MMHG | BODY MASS INDEX: 30.48 KG/M2 | OXYGEN SATURATION: 99 %

## 2023-09-27 DIAGNOSIS — N18.32 HYPERTENSIVE HEART AND KIDNEY DISEASE WITH CHRONIC SYSTOLIC CONGESTIVE HEART FAILURE AND STAGE 3B CHRONIC KIDNEY DISEASE (HCC): Chronic | ICD-10-CM

## 2023-09-27 DIAGNOSIS — I10 PRIMARY HYPERTENSION: ICD-10-CM

## 2023-09-27 DIAGNOSIS — I13.0 HYPERTENSIVE HEART AND KIDNEY DISEASE WITH CHRONIC SYSTOLIC CONGESTIVE HEART FAILURE AND STAGE 3B CHRONIC KIDNEY DISEASE (HCC): Chronic | ICD-10-CM

## 2023-09-27 DIAGNOSIS — E78.2 MIXED HYPERLIPIDEMIA: ICD-10-CM

## 2023-09-27 DIAGNOSIS — I25.10 CORONARY ARTERY DISEASE INVOLVING NATIVE CORONARY ARTERY OF NATIVE HEART WITHOUT ANGINA PECTORIS: Primary | ICD-10-CM

## 2023-09-27 DIAGNOSIS — I34.0 NONRHEUMATIC MITRAL VALVE REGURGITATION: ICD-10-CM

## 2023-09-27 DIAGNOSIS — I42.0 DILATED CARDIOMYOPATHY (HCC): ICD-10-CM

## 2023-09-27 DIAGNOSIS — N18.32 STAGE 3B CHRONIC KIDNEY DISEASE (HCC): ICD-10-CM

## 2023-09-27 DIAGNOSIS — J43.9 PULMONARY EMPHYSEMA, UNSPECIFIED EMPHYSEMA TYPE (HCC): ICD-10-CM

## 2023-09-27 DIAGNOSIS — I50.22 CHRONIC SYSTOLIC HEART FAILURE (HCC): ICD-10-CM

## 2023-09-27 DIAGNOSIS — I50.22 HYPERTENSIVE HEART AND KIDNEY DISEASE WITH CHRONIC SYSTOLIC CONGESTIVE HEART FAILURE AND STAGE 3B CHRONIC KIDNEY DISEASE (HCC): Chronic | ICD-10-CM

## 2023-09-27 PROCEDURE — 99214 OFFICE O/P EST MOD 30 MIN: CPT | Performed by: INTERNAL MEDICINE

## 2023-09-27 RX ORDER — ASPIRIN 81 MG/1
81 TABLET, CHEWABLE ORAL DAILY
Qty: 90 TABLET | Refills: 3 | Status: SHIPPED | OUTPATIENT
Start: 2023-09-27

## 2023-09-27 NOTE — PROGRESS NOTES
Cardiology Follow Up    Rico Hunt  11/25/1927  [id number]  [facility] [address] [place] [address] [id number] [phone]        Interval History:      70-year-old male with coronary artery disease status post CABG in 2653, chronic systolic heart failure LVEF 35-40%, moderate to severe mitral regurgitation, hypertension, hyperlipidemia, CKD, carotid artery stenosis, history of seizure, mild dementia presents for follow-up. He now feels well. No sob, cp, weight gain, swelling, orthopnea, pnd. He gets around with a walker.      Patient was admitted at [facility] in January 2022. He was managed for acute on chronic systolic heart failure. LVEF was 35-40% with mild diffuse hypokinesis. He was successfully diuresed and was discharged. He was noted to have mildly elevated troponin during hospital admission which was thought secondary to heart failure. There was a discussion about further cardiac workup given prior coronary artery disease and heart failure and patient and family opted for medical management.     Echocardiogram January 3, 2022\  •  Left Ventricle: Left ventricular cavity size is normal. Wall thickness is mildly increased. Systolic function is severely reduced. Estimated LVEF 35-40%(recommend MUGA scan for accurate LVEF) There is moderate global hypokinesis with regional variation - mid anteroseptum, apical wall appears severely hypokinetic There is mild concentric hypertrophy. Diastolic function is mildly abnormal, consistent with grade I (abnormal) relaxation. •  IVS: There is abnormal septal motion consistent with left bundle branch block. •  Left Atrium: The atrium is mildly dilated. •  Aortic Valve: There is mild regurgitation. •  Mitral Valve: There is moderate thickening. There is moderate annular calcification.  There is moderate to severe regurgitation. •  Tricuspid Valve: There is mild regurgitation.         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)   • Seizure (HCC)   • Stage 3b chronic kidney disease (HCC)   • Chronic systolic heart failure (HCC)   • Mitral regurgitation   • Cardiomyopathy ([address])   • Pulmonary emphysema, unspecified emphysema type ([address])     Past Medical History:   Diagnosis Date   • Arthritis    • CHF (congestive heart failure) (HCC)    • Coronary artery disease    • Esophageal reflux    • History of transfusion     "MANY YEARS AGO"   • Hyperlipidemia    • Hypertension    • Renal disorder     PT DENIES, BUT ON PROBLEM LIST   • Stroke [facility])     NOT SURE     Social History     Socioeconomic History   • Marital status:      Spouse name: Not on file   • Number of children: Not on file   • Years of education: Not on file   • Highest education level: Not on file   Occupational History   • Not on file   Tobacco Use   • Smoking status: Former     Packs/day: 0.25     Years: 80.00     Total pack years: 20.00     Types: Cigars, Cigarettes     Quit date: 1950     Years since quittin.4   • Smokeless tobacco: Never   • Tobacco comments:     occasional-cigar   Vaping Use   • Vaping Use: Never used   Substance and Sexual Activity   • Alcohol use:  Yes     Alcohol/week: 2.0 standard drinks of alcohol     Types: 1 Glasses of wine, 1 Cans of beer per week     Comment: daily   • Drug use: No   • Sexual activity: Not Currently     Partners: Female   Other Topics Concern   • Not on file   Social History Narrative   • Not on file     Social Determinants of Health     Financial Resource Strain: Low Risk  (2022)    Overall Financial Resource Strain (CARDIA)    • Difficulty of Paying Living Expenses: Not very hard   Food Insecurity: No Food Insecurity (1/3/2022)    Hunger Vital Sign    • Worried About Running Out of Food in the Last Year: Never true    • Ran Out of Food in the Last Year: Never true   Transportation Needs: No Transportation Needs (12/13/2022)    PRAPARE - Transportation    • Lack of Transportation (Medical): No    • Lack of Transportation (Non-Medical):  No   Physical Activity: Insufficiently Active (10/15/2020)    Exercise Vital Sign    • Days of Exercise per Week: 7 days    • Minutes of Exercise per Session: 10 min   Stress: No Stress Concern Present (8/31/2021)    [facility]    • Feeling of Stress : Not at all   Social Connections: Not on file   Intimate Partner Violence: Not on file   Housing Stability: Low Risk  (1/3/2022)    Housing Stability Vital Sign    • Unable to Pay for Housing in the Last Year: No    • Number of [address] in the Last Year: 1    • Unstable Housing in the Last Year: No      Family History   Problem Relation Age of Onset   • Cancer Mother    • Stroke Father      Past Surgical History:   Procedure Laterality Date   • ABDOMINAL SURGERY     • BACK SURGERY     • CARDIAC SURGERY     • COLON SURGERY     • CORONARY ARTERY BYPASS GRAFT     • FRACTURE SURGERY     • TONSILLECTOMY      Last Assessed: 12/5/2016       Current Outpatient Medications:   •  amLODIPine (NORVASC) 5 mg tablet, Take 1 tablet (5 mg total) by mouth daily, Disp: 90 tablet, Rfl: 3  •  aspirin (ECOTRIN) 325 mg EC tablet, Take 1 tablet by mouth daily, Disp: , Rfl:   •  cyanocobalamin (VITAMIN B-12) 500 MCG tablet, Take 1 tablet (500 mcg total) by mouth daily, Disp: 90 tablet, Rfl: 3  •  fexofenadine (ALLEGRA) 60 MG tablet, Take 60 mg by mouth daily, Disp: , Rfl:   •  fluticasone (FLONASE) 50 mcg/act nasal spray, 2 sprays into each nostril daily, Disp: , Rfl:   •  fluticasone-umeclidinium-vilanterol (Trelegy Ellipta) 100-62.5-25 MCG/INH inhaler, Inhale 1 puff daily Rinse mouth after use., Disp: , Rfl:   •  furosemide (LASIX) 20 mg tablet, Take 1 tablet (20 mg total) by mouth daily, Disp: 90 tablet, Rfl: 3  •  levETIRAcetam (KEPPRA) 250 mg tablet, Take 1 tablet (250 mg total) by mouth 2 (two) times a day, Disp: 180 tablet, Rfl: 3  •  metoprolol succinate (TOPROL-XL) 25 mg 24 hr tablet, TAKE 1 TABLET DAILY, Disp: 90 tablet, Rfl: 3  •  pantoprazole (PROTONIX) 40 mg tablet, TAKE 1 TABLET DAILY, Disp: 90 tablet, Rfl: 3  •  Polyethylene Glycol 3350 (MIRALAX PO), Take by mouth as needed  , Disp: , Rfl:   •  simvastatin (ZOCOR) 40 mg tablet, TAKE 1 TABLET DAILY, Disp: 90 tablet, Rfl: 3  •  tamsulosin (FLOMAX) 0.4 mg, Take 0.4 mg by mouth daily, Disp: , Rfl:   Allergies   Allergen Reactions   • Penicillamine Rash   • Penicillins Rash     Rash on hands   • Morphine Other (See Comments)     Low blood pressure       Labs:  No visits with results within 2 Month(s) from this visit. Latest known visit with results is:   Appointment on 06/21/2023   Component Date Value   • WBC 06/21/2023 4.15 (L)    • RBC 06/21/2023 3.67 (L)    • Hemoglobin 06/21/2023 12.5    • Hematocrit 06/21/2023 36.5    • MCV 06/21/2023 100 (H)    • MCH 06/21/2023 34.1    • MCHC 06/21/2023 34.2    • RDW 06/21/2023 12.9    • Platelets 44/73/0007 237    • MPV 06/21/2023 8.9    • Sodium 06/21/2023 130 (L)    • Potassium 06/21/2023 4.4    • Chloride 06/21/2023 97    • CO2 06/21/2023 23    • ANION GAP 06/21/2023 10    • BUN 06/21/2023 24    • Creatinine 06/21/2023 1.46 (H)    • Glucose, Fasting 06/21/2023 120 (H)    • Calcium 06/21/2023 9.9    • eGFR 06/21/2023 40    • Cholesterol 06/21/2023 173    • Triglycerides 06/21/2023 102    • HDL, Direct 06/21/2023 94    • LDL Calculated 06/21/2023 59    • Non-HDL-Chol (CHOL-HDL) 06/21/2023 79      Imaging: No results found.     Review of Systems:  Review of Systems Constitutional:  No fever or chills  Cardiac:  No chest pain, shortness of breath. Respiratory:  . No coughing, hemoptysis, wheezing. Abdominal:  No nausea, vomiting, abdominal discomfort. urinary:  No hematuria  Extremities:  No swelling nor edema. Physical Exam:  Physical Exam HEENT:  Unremarkable. No JVD. Lungs:  Clear  Cardiac:  Regular rate and rhythm with 2/6 systolic murmur, no rubs nor gallops. /  Abdomen:  Soft, nontender, no rebound, normal bowel sounds. Extremities:  No clubbing cyanosis nor edema. Neuro:  Grossly nonfocal.  Psych:  Alert and oriented x3      Chronic heart failure reduced ejection fraction. CAD status post CABG. Mitral regurgitation. Primary hypertension. Mixed hyperlipidemia. CKD    Discussion/Summary:  He is doing great, seems asymptomatic from a cardiovascular standpoint. No s/s of chf. He appears euvolemic. He does admit to putting salt on scrapple. He has declined more invasive therapies therefore no need to check an echocardiogram for the mitral regurgitation and ejection fraction. “the mitral regurgitation has been there a long time doc”  Creatinine improved from 1.8  To 1.46. No Ace or Arb due to renal insufficiency. BP and lipids are well controlled. LDL 59. Continue his cardiac meds aspirin, amlodipine, Lasix, metoprolol and simvastatin. We discussed s/s of chf and acs, daily weights, salt avoidance.

## 2023-11-22 DIAGNOSIS — I13.0 HYPERTENSIVE HEART AND KIDNEY DISEASE WITH CHRONIC SYSTOLIC CONGESTIVE HEART FAILURE AND STAGE 3B CHRONIC KIDNEY DISEASE (HCC): ICD-10-CM

## 2023-11-22 DIAGNOSIS — N18.32 HYPERTENSIVE HEART AND KIDNEY DISEASE WITH CHRONIC SYSTOLIC CONGESTIVE HEART FAILURE AND STAGE 3B CHRONIC KIDNEY DISEASE (HCC): ICD-10-CM

## 2023-11-22 DIAGNOSIS — I50.22 HYPERTENSIVE HEART AND KIDNEY DISEASE WITH CHRONIC SYSTOLIC CONGESTIVE HEART FAILURE AND STAGE 3B CHRONIC KIDNEY DISEASE (HCC): ICD-10-CM

## 2023-11-22 RX ORDER — FUROSEMIDE 20 MG/1
20 TABLET ORAL DAILY
Qty: 90 TABLET | Refills: 3 | Status: SHIPPED | OUTPATIENT
Start: 2023-11-22 | End: 2023-11-28 | Stop reason: SDUPTHER

## 2023-11-28 DIAGNOSIS — I50.22 HYPERTENSIVE HEART AND KIDNEY DISEASE WITH CHRONIC SYSTOLIC CONGESTIVE HEART FAILURE AND STAGE 3B CHRONIC KIDNEY DISEASE (HCC): ICD-10-CM

## 2023-11-28 DIAGNOSIS — I13.0 HYPERTENSIVE HEART AND KIDNEY DISEASE WITH CHRONIC SYSTOLIC CONGESTIVE HEART FAILURE AND STAGE 3B CHRONIC KIDNEY DISEASE (HCC): ICD-10-CM

## 2023-11-28 DIAGNOSIS — N18.32 HYPERTENSIVE HEART AND KIDNEY DISEASE WITH CHRONIC SYSTOLIC CONGESTIVE HEART FAILURE AND STAGE 3B CHRONIC KIDNEY DISEASE (HCC): ICD-10-CM

## 2023-11-28 DIAGNOSIS — R56.9 SEIZURE (HCC): ICD-10-CM

## 2023-11-28 RX ORDER — AMLODIPINE BESYLATE 5 MG/1
5 TABLET ORAL DAILY
Qty: 90 TABLET | Refills: 3 | Status: SHIPPED | OUTPATIENT
Start: 2023-11-28

## 2023-11-28 RX ORDER — FUROSEMIDE 20 MG/1
20 TABLET ORAL DAILY
Qty: 90 TABLET | Refills: 3 | Status: SHIPPED | OUTPATIENT
Start: 2023-11-28

## 2023-11-28 RX ORDER — LEVETIRACETAM 250 MG/1
250 TABLET ORAL 2 TIMES DAILY
Qty: 180 TABLET | Refills: 3 | Status: SHIPPED | OUTPATIENT
Start: 2023-11-28

## 2023-11-28 NOTE — TELEPHONE ENCOUNTER
Good afternoon. I am calling for a 90 day refill on three different prescriptions for Arline Carlson birth date 11/25/1927. These prescriptions need to be ordered through [facility] because [facility] will now not fill them because they need to be mail ordered. The first one is Amlodipine Besylate 5 milligrams one a day. The other is left [employer] 250 milligrams, one in the morning and 1:00 at night and also Furosemide 20 milligrams tablet once a day. Again, we were in, I don't know, during the summer and they refilled them, but they sent it to [facility] and [facility] will no longer fill them without charging, without insurance. Insurance will only pay if it is through express script. Please call these into express script. My phone number is [phone]. Thank you.

## 2023-12-12 ENCOUNTER — APPOINTMENT (OUTPATIENT)
Dept: LAB | Facility: HOSPITAL | Age: 88
End: 2023-12-12
Attending: INTERNAL MEDICINE
Payer: MEDICARE

## 2023-12-12 ENCOUNTER — RA CDI HCC (OUTPATIENT)
Dept: OTHER | Facility: HOSPITAL | Age: 88
End: 2023-12-12

## 2023-12-12 DIAGNOSIS — I50.22 HYPERTENSIVE HEART AND KIDNEY DISEASE WITH CHRONIC SYSTOLIC CONGESTIVE HEART FAILURE AND STAGE 3B CHRONIC KIDNEY DISEASE (HCC): ICD-10-CM

## 2023-12-12 DIAGNOSIS — N18.32 HYPERTENSIVE HEART AND KIDNEY DISEASE WITH CHRONIC SYSTOLIC CONGESTIVE HEART FAILURE AND STAGE 3B CHRONIC KIDNEY DISEASE (HCC): ICD-10-CM

## 2023-12-12 DIAGNOSIS — E78.2 MIXED HYPERLIPIDEMIA: ICD-10-CM

## 2023-12-12 DIAGNOSIS — I13.0 HYPERTENSIVE HEART AND KIDNEY DISEASE WITH CHRONIC SYSTOLIC CONGESTIVE HEART FAILURE AND STAGE 3B CHRONIC KIDNEY DISEASE (HCC): ICD-10-CM

## 2023-12-12 LAB
25(OH)D3 SERPL-MCNC: 63.7 NG/ML (ref 30–100)
ANION GAP SERPL CALCULATED.3IONS-SCNC: 10 MMOL/L
BUN SERPL-MCNC: 29 MG/DL (ref 5–25)
CALCIUM SERPL-MCNC: 9.1 MG/DL (ref 8.4–10.2)
CHLORIDE SERPL-SCNC: 101 MMOL/L (ref 96–108)
CHOLEST SERPL-MCNC: 148 MG/DL
CO2 SERPL-SCNC: 23 MMOL/L (ref 21–32)
CREAT SERPL-MCNC: 1.56 MG/DL (ref 0.6–1.3)
CREAT UR-MCNC: 61.4 MG/DL
ERYTHROCYTE [DISTWIDTH] IN BLOOD BY AUTOMATED COUNT: 13.5 % (ref 11.6–15.1)
GFR SERPL CREATININE-BSD FRML MDRD: 36 ML/MIN/1.73SQ M
GLUCOSE P FAST SERPL-MCNC: 97 MG/DL (ref 65–99)
HCT VFR BLD AUTO: 33.3 % (ref 36.5–49.3)
HDLC SERPL-MCNC: 85 MG/DL
HGB BLD-MCNC: 11.3 G/DL (ref 12–17)
LDLC SERPL CALC-MCNC: 47 MG/DL (ref 0–100)
MCH RBC QN AUTO: 34 PG (ref 26.8–34.3)
MCHC RBC AUTO-ENTMCNC: 33.9 G/DL (ref 31.4–37.4)
MCV RBC AUTO: 100 FL (ref 82–98)
MICROALBUMIN UR-MCNC: <7 MG/L
MICROALBUMIN/CREAT 24H UR: <11 MG/G CREATININE (ref 0–30)
NONHDLC SERPL-MCNC: 63 MG/DL
PLATELET # BLD AUTO: 220 THOUSANDS/UL (ref 149–390)
PMV BLD AUTO: 9.4 FL (ref 8.9–12.7)
POTASSIUM SERPL-SCNC: 4.1 MMOL/L (ref 3.5–5.3)
PTH-INTACT SERPL-MCNC: 49.1 PG/ML (ref 12–88)
RBC # BLD AUTO: 3.32 MILLION/UL (ref 3.88–5.62)
SODIUM SERPL-SCNC: 134 MMOL/L (ref 135–147)
TRIGL SERPL-MCNC: 78 MG/DL
WBC # BLD AUTO: 5.1 THOUSAND/UL (ref 4.31–10.16)

## 2023-12-12 PROCEDURE — 85027 COMPLETE CBC AUTOMATED: CPT

## 2023-12-12 PROCEDURE — 80061 LIPID PANEL: CPT

## 2023-12-12 PROCEDURE — 82570 ASSAY OF URINE CREATININE: CPT

## 2023-12-12 PROCEDURE — 82043 UR ALBUMIN QUANTITATIVE: CPT

## 2023-12-12 PROCEDURE — 83970 ASSAY OF PARATHORMONE: CPT

## 2023-12-12 PROCEDURE — 82306 VITAMIN D 25 HYDROXY: CPT

## 2023-12-12 PROCEDURE — 80048 BASIC METABOLIC PNL TOTAL CA: CPT

## 2023-12-12 PROCEDURE — 36415 COLL VENOUS BLD VENIPUNCTURE: CPT

## 2023-12-19 ENCOUNTER — OFFICE VISIT (OUTPATIENT)
Age: 88
End: 2023-12-19
Payer: MEDICARE

## 2023-12-19 VITALS
SYSTOLIC BLOOD PRESSURE: 110 MMHG | HEART RATE: 65 BPM | WEIGHT: 177 LBS | HEIGHT: 63 IN | DIASTOLIC BLOOD PRESSURE: 66 MMHG | BODY MASS INDEX: 31.36 KG/M2 | RESPIRATION RATE: 17 BRPM | TEMPERATURE: 68.5 F | OXYGEN SATURATION: 98 %

## 2023-12-19 DIAGNOSIS — N18.32 HYPERTENSIVE HEART AND KIDNEY DISEASE WITH CHRONIC SYSTOLIC CONGESTIVE HEART FAILURE AND STAGE 3B CHRONIC KIDNEY DISEASE (HCC): Primary | Chronic | ICD-10-CM

## 2023-12-19 DIAGNOSIS — E78.2 MIXED HYPERLIPIDEMIA: ICD-10-CM

## 2023-12-19 DIAGNOSIS — I13.0 HYPERTENSIVE HEART AND KIDNEY DISEASE WITH CHRONIC SYSTOLIC CONGESTIVE HEART FAILURE AND STAGE 3B CHRONIC KIDNEY DISEASE (HCC): Primary | Chronic | ICD-10-CM

## 2023-12-19 DIAGNOSIS — J43.9 PULMONARY EMPHYSEMA, UNSPECIFIED EMPHYSEMA TYPE (HCC): ICD-10-CM

## 2023-12-19 DIAGNOSIS — I50.22 HYPERTENSIVE HEART AND KIDNEY DISEASE WITH CHRONIC SYSTOLIC CONGESTIVE HEART FAILURE AND STAGE 3B CHRONIC KIDNEY DISEASE (HCC): Primary | Chronic | ICD-10-CM

## 2023-12-19 DIAGNOSIS — Z23 ENCOUNTER FOR IMMUNIZATION: ICD-10-CM

## 2023-12-19 DIAGNOSIS — Z00.00 MEDICARE ANNUAL WELLNESS VISIT, SUBSEQUENT: ICD-10-CM

## 2023-12-19 PROBLEM — N35.014 POST-TRAUMATIC MALE URETHRAL STRICTURE: Status: ACTIVE | Noted: 2023-11-03

## 2023-12-19 PROCEDURE — 90662 IIV NO PRSV INCREASED AG IM: CPT | Performed by: INTERNAL MEDICINE

## 2023-12-19 PROCEDURE — G0439 PPPS, SUBSEQ VISIT: HCPCS | Performed by: INTERNAL MEDICINE

## 2023-12-19 PROCEDURE — 99214 OFFICE O/P EST MOD 30 MIN: CPT | Performed by: INTERNAL MEDICINE

## 2023-12-19 PROCEDURE — G0008 ADMIN INFLUENZA VIRUS VAC: HCPCS | Performed by: INTERNAL MEDICINE

## 2023-12-19 NOTE — ASSESSMENT & PLAN NOTE
Lab Units 12/12/23  1025 06/21/23  1019 12/06/22  0946   CREATININE mg/dL 1.56* 1.46* 1.66*   EGFR ml/min/1.73sq m 36 40 34     Kidney function is stable at this time. His creatinine is around 1.4 mg/dL to 1.6 mg/dL. We will continue to keep blood pressure controlled. He appeared euvolemic on exam today. He will continue current dose of furosemide. No change in his medications ultimately. We will repeat labs in 6 months.

## 2023-12-19 NOTE — PROGRESS NOTES
Assessment and Plan:     1. 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 His creatinine is around 1.4 mg/dL to 1.6 mg/dL. We will continue to keep blood pressure controlled. He appeared euvolemic on exam today. He will continue current dose of furosemide. No change in his medications ultimately. We will repeat labs in 6 months.    Orders:  -     CBC; Future; Expected date: 05/19/2024  -     PTH, intact; Future; Expected date: 05/19/2024  -     Phosphorus; Future; Expected date: 05/19/2024  -     Comprehensive metabolic panel; Future; Expected date: 05/19/2024  -     Albumin / creatinine urine ratio; Future; Expected date: 05/19/2024    2. Mixed hyperlipidemia  Assessment & Plan:  Cholesterol is excellent. He will continue simvastatin as prescribed.    Orders:  -     Lipid Panel with Direct LDL reflex; Future; Expected date: 05/19/2024    3. Pulmonary emphysema (HCC)  Assessment & Plan:  This is stable at this time, and he follows regularly with pulmonology. He will continue Trelegy Ellipta as prescribed.      4. Medicare annual wellness visit, subsequent    5. Encounter for immunization  -     influenza vaccine, high-dose, PF 0.7 mL (FLUZONE HIGH-DOSE)      Depression Screening and Follow-up Plan: Patient was screened for depression during today's encounter. They screened negative with a PHQ-2 score of 0.      Preventive health issues were discussed with patient, and age appropriate screening tests were ordered as noted in patient's After Visit Summary.  Personalized health advice and appropriate referrals for health education or preventive services given if needed, as noted in patient's After Visit Summary.     History of Present Illness:      Patient presents for a Medicare Wellness Visit    Arjun Huff is a 89-year-old male who presents for routine follow-up and Medicare annual wellness visit. He has a history of hypertension, chronic systolic congestive heart failure, stage 3b chronic kidney disease, pulmonary emphysema, seizure disorder, and mixed hyperlipidemia. No recent hospitalizations or ER visits. He follows with Dr. Tavares due to enlarged prostate. He was seen back in 11/2023. He follows Dr. Shipman of cardiology for his heart. He was last seen in 09/2023. No changes to his medication regimen were made at that time. He is not on an ACE or an ARB due to renal insufficiency. His blood pressure and lipid panel have been well controlled. He was advised to continue on his cardiac medications which include aspirin, amlodipine, Lasix, metoprolol, and simvastatin. He had lab work done on 12/12/2023. The patient's hemoglobin was 11.3 g/dL, MCV was 100 fl, platelets were 220 10^9/L, white blood cell count was 5.10 10^9/L, sodium was 134 mEq/L, potassium was 4.1 mmol/L, creatinine was 1.56 mg/dL, eGFR was 36%, glucose was 97 mg/dL, PTH normal at 49.1 pg/mL, vitamin D normal at 63.7 ng/mL, Albumin-to-creatinine urine ratio normal at less than 11 mg/g, total cholesterol at 148 mg/dL, triglycerides was 78 ng/dL, HDL was 85 mg/dL, and LDL was 47 mg/dL. He is accompanied by an adult female.     The patient is doing fine today. He denies any changes from his other doctors recently. He has not been hospitalized recently. He denies any problems with heart failure. He denies any falls recently.    The female  expresses that the patient has difficulty swallowing his medications. In addition, the patient notices bruising on his bilateral shoulder.    Patient Care Team:  Ismael Riggs DO as PCP - General  Ray Angeles MD (Dermatology)     Review of Systems:     The pertinent positive and negative findings are as noted in the HPI.     Medicare  Screening Tests and Risk Assessments:     Arjun is here for his Subsequent Wellness visit. Last Medicare Wellness visit information reviewed, patient interviewed and updates made to the record today.      Health Risk Assessment:   Patient rates overall health as good. Patient feels that their physical health rating is same. Patient is satisfied with their life. Eyesight was rated as same. Hearing was rated as same. Patient feels that their emotional and mental health rating is same. Patients states they are never, rarely angry. Patient states they are never, rarely unusually tired/fatigued. Pain experienced in the last 7 days has been none. Patient states that he has experienced no weight loss or gain in last 6 months.     Depression Screening:   PHQ-2 Score: 0      Fall Risk Screening:   In the past year, patient has experienced: no history of falling in past year      Home Safety:  Patient has trouble with stairs inside or outside of their home. Patient has working smoke alarms and has working carbon monoxide detector. Home safety hazards include: none.     Nutrition:   Current diet is Regular.     Medications:   Patient is currently taking over-the-counter supplements. OTC medications include: see medication list. Patient is able to manage medications.     Activities of Daily Living (ADLs)/Instrumental Activities of Daily Living (IADLs):   Walk and transfer into and out of bed and chair?: Yes  Dress and groom yourself?: Yes    Bathe or shower yourself?: Yes    Feed yourself? Yes  Do your laundry/housekeeping?: No  Manage your money, pay your bills and track your expenses?: No  Make your own meals?: No    Do your own shopping?: No    ADL comments: Patient lives with daughter who helps with patients needs    Previous Hospitalizations:   Any hospitalizations or ED visits within the last 12 months?: No      Advance Care Planning:   Living will: Yes    Durable POA for healthcare: Yes    Advanced directive: Yes     Five wishes given: No      Cognitive Screening:   Provider or family/friend/caregiver concerned regarding cognition?: No    PREVENTIVE SCREENINGS      Cardiovascular Screening:    General: Screening Not Indicated and History Lipid Disorder      Diabetes Screening:     General: Screening Current      Colorectal Cancer Screening:     General: Screening Not Indicated      Prostate Cancer Screening:    General: Screening Not Indicated      Osteoporosis Screening:    General: Screening Not Indicated      Abdominal Aortic Aneurysm (AAA) Screening:    Risk factors include: tobacco use        General: Screening Not Indicated      Lung Cancer Screening:     General: Screening Not Indicated      Hepatitis C Screening:    General: Screening Not Indicated    Screening, Brief Intervention, and Referral to Treatment (SBIRT)    Screening  Typical number of drinks in a day: 0  Typical number of drinks in a week: 0  Interpretation: Low risk drinking behavior.    AUDIT-C Screenin) How often did you have a drink containing alcohol in the past year? never  2) How many drinks did you have on a typical day when you were drinking in the past year? 0  3) How often did you have 6 or more drinks on one occasion in the past year? never    AUDIT-C Score: 0  Interpretation: Score 0-3 (male): Negative screen for alcohol misuse    Single Item Drug Screening:  How often have you used an illegal drug (including marijuana) or a prescription medication for non-medical reasons in the past year? never    Single Item Drug Screen Score: 0  Interpretation: Negative screen for possible drug use disorder    Other Counseling Topics:   Car/seat belt/driving safety, skin self-exam, sunscreen and regular weightbearing exercise.     Social Determinants of Health     Tobacco Use: Medium Risk (2023)    Patient History     Smoking Tobacco Use: Former     Smokeless Tobacco Use: Never     Passive Exposure: Not on file   Alcohol Use: Not At Risk  "(12/19/2023)    AUDIT-C     Frequency of Alcohol Consumption: Never     Average Number of Drinks: Patient does not drink     Frequency of Binge Drinking: Never   Financial Resource Strain: Low Risk  (12/19/2023)    Overall Financial Resource Strain (CARDIA)     Difficulty of Paying Living Expenses: Not very hard   Food Insecurity: No Food Insecurity (1/3/2022)    Hunger Vital Sign     Worried About Running Out of Food in the Last Year: Never true     Ran Out of Food in the Last Year: Never true   Transportation Needs: No Transportation Needs (12/19/2023)    PRAPARE - Transportation     Lack of Transportation (Medical): No     Lack of Transportation (Non-Medical): No   Physical Activity: Insufficiently Active (10/15/2020)    Exercise Vital Sign     Days of Exercise per Week: 7 days     Minutes of Exercise per Session: 10 min   Stress: No Stress Concern Present (12/19/2023)    [employer] - Occupational Stress Questionnaire     Feeling of Stress : Not at all   Social Connections: Not on file   Intimate Partner Violence: Not on file   Depression: Not at risk (12/19/2023)    PHQ-2     PHQ-2 Score: 0   Housing Stability: Low Risk  (1/3/2022)    Housing Stability Vital Sign     Unable to Pay for Housing in the Last Year: No     Number of Places Lived in the Last Year: 1     Unstable Housing in the Last Year: No   Utilities: Not on file   Health Literacy: Not on file      Physical Exam:     /66 (BP Location: Left arm, Patient Position: Sitting, Cuff Size: Large)   Pulse 65   Temp (!) 68.5 °F (20.3 °C) (Tympanic)   Resp 17   Ht 5' 3\"   Wt 80.3 kg (177 lb) Comment: with shoes on  SpO2 98%   BMI 31.35 kg/m²     Physical Exam  Constitutional:       General: He is not in acute distress.     Appearance: He is not ill-appearing.   Cardiovascular:      Rate and Rhythm: Normal rate and regular rhythm.      Heart sounds: No murmur heard.  Pulmonary:      Effort: Pulmonary effort is normal. " No respiratory distress.      Breath sounds: No wheezing.   Abdominal:      General: Bowel sounds are normal. There is no distension.      Tenderness: There is no abdominal tenderness.   Musculoskeletal:      Right lower leg: No edema.      Left lower leg: No edema.   Skin:     Findings: Bruising (arms) present.   Neurological:      Mental Status: He is alert.      Gait: Gait abnormal.        Transcribed for Ismael Riggs DO, by Chase Cotter on 12/19/23 at 5:34 PM. Powered by Dragon [employer].

## 2023-12-19 NOTE — ASSESSMENT & PLAN NOTE
This is stable at this time, and he follows regularly with pulmonology. He will continue Trelegy Ellipta as prescribed.

## 2023-12-19 NOTE — PATIENT INSTRUCTIONS
Medicare Preventive Visit Patient Instructions  Thank you for completing your Welcome to Medicare Visit or Medicare Annual Wellness Visit today. Your next wellness visit will be due in one year (12/19/2024).  The screening/preventive services that you may require over the next 5-10 years are detailed below. Some tests may not apply to you based off risk factors and/or age. Screening tests ordered at today's visit but not completed yet may show as past due. Also, please note that scanned in results may not display below.  Preventive Screenings:  Service Recommendations Previous Testing/Comments   Colorectal Cancer Screening  Colonoscopy    Fecal Occult Blood Test (FOBT)/Fecal Immunochemical Test (FIT)  Fecal DNA/Cologuard Test  Flexible Sigmoidoscopy Age: 45-75 years old   Colonoscopy: every 10 years (May be performed more frequently if at higher risk)  OR  FOBT/FIT: every 1 year  OR  Cologuard: every 3 years  OR  Sigmoidoscopy: every 5 years  Screening may be recommended earlier than age 45 if at higher risk for colorectal cancer. Also, an individualized decision between you and your healthcare provider will decide whether screening between the ages of 76-85 would be appropriate. Colonoscopy: 08/01/2009  FOBT/FIT: Not on file  Cologuard: Not on file  Sigmoidoscopy: Not on file    Screening Not Indicated     Prostate Cancer Screening Individualized decision between patient and health care provider in men between ages of 55-69   Medicare will cover every 12 months beginning on the day after your 50th birthday PSA: 1.0 ng/mL     Screening Not Indicated     Hepatitis C Screening Once for adults born between 1945 and 1965  More frequently in patients at high risk for Hepatitis C Hep C Antibody: Not on file        Diabetes Screening 1-2 times per year if you're at risk for diabetes or have pre-diabetes Fasting glucose: 97 mg/dL (12/12/2023)  A1C: 6.2 % (9/29/2021)  Screening Current   Cholesterol Screening Once every 5  years if you don't have a lipid disorder. May order more often based on risk factors. Lipid panel: 12/12/2023  Screening Not Indicated  History Lipid Disorder      Other Preventive Screenings Covered by Medicare:  Abdominal Aortic Aneurysm (AAA) Screening: covered once if your at risk. You're considered to be at risk if you have a family history of AAA or a male between the age of 65-75 who smoking at least 100 cigarettes in your lifetime.  Lung Cancer Screening: covers low dose CT scan once per year if you meet all of the following conditions: (1) Age 55-77; (2) No signs or symptoms of lung cancer; (3) Current smoker or have quit smoking within the last 15 years; (4) You have a tobacco smoking history of at least 20 pack years (packs per day x number of years you smoked); (5) You get a written order from a healthcare provider.  Glaucoma Screening: covered annually if you're considered high risk: (1) You have diabetes OR (2) Family history of glaucoma OR (3)  aged 50 and older OR (4)  American aged 65 and older  Osteoporosis Screening: covered every 2 years if you meet one of the following conditions: (1) Have a vertebral abnormality; (2) On glucocorticoid therapy for more than 3 months; (3) Have primary hyperparathyroidism; (4) On osteoporosis medications and need to assess response to drug therapy.  HIV Screening: covered annually if you're between the age of 15-65. Also covered annually if you are younger than 15 and older than 65 with risk factors for HIV infection. For pregnant patients, it is covered up to 3 times per pregnancy.    Immunizations:  Immunization Recommendations   Influenza Vaccine Annual influenza vaccination during flu season is recommended for all persons aged >= 6 months who do not have contraindications   Pneumococcal Vaccine   * Pneumococcal conjugate vaccine = PCV13 (Prevnar 13), PCV15 (Vaxneuvance), PCV20 (Prevnar 20)  * Pneumococcal polysaccharide vaccine = PPSV23  (Pneumovax) Adults 19-63 yo with certain risk factors or if 65+ yo  If never received any pneumonia vaccine: recommend Prevnar 20 (PCV20)  Give PCV20 if previously received 1 dose of PCV13 or PPSV23   Hepatitis B Vaccine 3 dose series if at intermediate or high risk (ex: diabetes, end stage renal disease, liver disease)   Respiratory syncytial virus (RSV) Vaccine - COVERED BY MEDICARE PART D  * RSVPreF3 (Arexvy) CDC recommends that adults 60 years of age and older may receive a single dose of RSV vaccine using shared clinical decision-making (SCDM)   Tetanus (Td) Vaccine - COST NOT COVERED BY MEDICARE PART B Following completion of primary series, a booster dose should be given every 10 years to maintain immunity against tetanus. Td may also be given as tetanus wound prophylaxis.   Tdap Vaccine - COST NOT COVERED BY MEDICARE PART B Recommended at least once for all adults. For pregnant patients, recommended with each pregnancy.   Shingles Vaccine (Shingrix) - COST NOT COVERED BY MEDICARE PART B  2 shot series recommended in those 19 years and older who have or will have weakened immune systems or those 50 years and older     Health Maintenance Due:  There are no preventive care reminders to display for this patient.  Immunizations Due:      Topic Date Due    Influenza Vaccine (1) 09/01/2023    COVID-19 Vaccine (4 - 2023-24 season) 09/01/2023     Advance Directives   What are advance directives?  Advance directives are legal documents that state your wishes and plans for medical care. These plans are made ahead of time in case you lose your ability to make decisions for yourself. Advance directives can apply to any medical decision, such as the treatments you want, and if you want to donate organs.   What are the types of advance directives?  There are many types of advance directives, and each state has rules about how to use them. You may choose a combination of any of the following:  Living will:  This is a  written record of the treatment you want. You can also choose which treatments you do not want, which to limit, and which to stop at a certain time. This includes surgery, medicine, IV fluid, and tube feedings.   Durable power of  for healthcare (DPAHC):  This is a written record that states who you want to make healthcare choices for you when you are unable to make them for yourself. This person, called a proxy, is usually a family member or a friend. You may choose more than 1 proxy.  Do not resuscitate (DNR) order:  A DNR order is used in case your heart stops beating or you stop breathing. It is a request not to have certain forms of treatment, such as CPR. A DNR order may be included in other types of advance directives.  Medical directive:  This covers the care that you want if you are in a coma, near death, or unable to make decisions for yourself. You can list the treatments you want for each condition. Treatment may include pain medicine, surgery, blood transfusions, dialysis, IV or tube feedings, and a ventilator (breathing machine).  Values history:  This document has questions about your views, beliefs, and how you feel and think about life. This information can help others choose the care that you would choose.  Why are advance directives important?  An advance directive helps you control your care. Although spoken wishes may be used, it is better to have your wishes written down. Spoken wishes can be misunderstood, or not followed. Treatments may be given even if you do not want them. An advance directive may make it easier for your family to make difficult choices about your care.   Weight Management   Why it is important to manage your weight:  Being overweight increases your risk of health conditions such as heart disease, high blood pressure, type 2 diabetes, and certain types of cancer. It can also increase your risk for osteoarthritis, sleep apnea, and other respiratory problems. Aim for  a slow, steady weight loss. Even a small amount of weight loss can lower your risk of health problems.  How to lose weight safely:  A safe and healthy way to lose weight is to eat fewer calories and get regular exercise. You can lose up about 1 pound a week by decreasing the number of calories you eat by 500 calories each day.   Healthy meal plan for weight management:  A healthy meal plan includes a variety of foods, contains fewer calories, and helps you stay healthy. A healthy meal plan includes the following:  Eat whole-grain foods more often.  A healthy meal plan should contain fiber. Fiber is the part of grains, fruits, and vegetables that is not broken down by your body. Whole-grain foods are healthy and provide extra fiber in your diet. Some examples of whole-grain foods are whole-wheat breads and pastas, oatmeal, brown rice, and bulgur.  Eat a variety of vegetables every day.  Include dark, leafy greens such as spinach, kale, michael greens, and mustard greens. Eat yellow and orange vegetables such as carrots, sweet potatoes, and winter squash.   Eat a variety of fruits every day.  Choose fresh or canned fruit (canned in its own juice or light syrup) instead of juice. Fruit juice has very little or no fiber.  Eat low-fat dairy foods.  Drink fat-free (skim) milk or 1% milk. Eat fat-free yogurt and low-fat cottage cheese. Try low-fat cheeses such as mozzarella and other reduced-fat cheeses.  Choose meat and other protein foods that are low in fat.  Choose beans or other legumes such as split peas or lentils. Choose fish, skinless poultry (chicken or turkey), or lean cuts of red meat (beef or pork). Before you cook meat or poultry, cut off any visible fat.   Use less fat and oil.  Try baking foods instead of frying them. Add less fat, such as margarine, sour cream, regular salad dressing and mayonnaise to foods. Eat fewer high-fat foods. Some examples of high-fat foods include french fries, doughnuts, ice  cream, and cakes.  Eat fewer sweets.  Limit foods and drinks that are high in sugar. This includes candy, cookies, regular soda, and sweetened drinks.  Exercise:  Exercise at least 30 minutes per day on most days of the week. Some examples of exercise include walking, biking, dancing, and swimming. You can also fit in more physical activity by taking the stairs instead of the elevator or parking farther away from stores. Ask your healthcare provider about the best exercise plan for you.    © Copyright [employer] 2018 Information is for End User's use only and may not be sold, redistributed or otherwise used for commercial purposes. All illustrations and images included in CareNotes® are the copyrighted property of A.D.A.M., Inc. or [employer]

## 2024-01-01 ENCOUNTER — HOSPITAL ENCOUNTER (EMERGENCY)
Facility: HOSPITAL | Age: 89
Discharge: HOME | DRG: 871 | End: 2024-04-16
Attending: EMERGENCY MEDICINE
Payer: MEDICARE

## 2024-01-01 ENCOUNTER — APPOINTMENT (EMERGENCY)
Dept: CT IMAGING | Facility: HOSPITAL | Age: 89
DRG: 871 | End: 2024-01-01
Payer: MEDICARE

## 2024-01-01 ENCOUNTER — APPOINTMENT (INPATIENT)
Dept: ULTRASOUND IMAGING | Facility: HOSPITAL | Age: 89
DRG: 871 | End: 2024-01-01
Payer: MEDICARE

## 2024-01-01 ENCOUNTER — APPOINTMENT (OUTPATIENT)
Dept: LAB | Facility: HOSPITAL | Age: 89
DRG: 871 | End: 2024-01-01
Payer: MEDICARE

## 2024-01-01 ENCOUNTER — APPOINTMENT (INPATIENT)
Dept: NON INVASIVE DIAGNOSTICS | Facility: HOSPITAL | Age: 89
DRG: 871 | End: 2024-01-01
Payer: MEDICARE

## 2024-01-01 ENCOUNTER — HOSPITAL ENCOUNTER (INPATIENT)
Facility: HOSPITAL | Age: 89
LOS: 1 days | DRG: 871 | End: 2024-04-18
Attending: EMERGENCY MEDICINE | Admitting: ANESTHESIOLOGY
Payer: MEDICARE

## 2024-01-01 VITALS
TEMPERATURE: 98.4 F | DIASTOLIC BLOOD PRESSURE: 66 MMHG | OXYGEN SATURATION: 94 % | SYSTOLIC BLOOD PRESSURE: 99 MMHG | HEART RATE: 96 BPM | RESPIRATION RATE: 18 BRPM

## 2024-01-01 VITALS
BODY MASS INDEX: 30.3 KG/M2 | HEART RATE: 115 BPM | RESPIRATION RATE: 24 BRPM | OXYGEN SATURATION: 99 % | HEIGHT: 63 IN | DIASTOLIC BLOOD PRESSURE: 54 MMHG | TEMPERATURE: 97.9 F | WEIGHT: 171 LBS | SYSTOLIC BLOOD PRESSURE: 76 MMHG

## 2024-01-01 DIAGNOSIS — R35.0 URINARY FREQUENCY: ICD-10-CM

## 2024-01-01 DIAGNOSIS — N39.0 URINARY TRACT INFECTION: Primary | ICD-10-CM

## 2024-01-01 DIAGNOSIS — N39.0 UTI (URINARY TRACT INFECTION): Primary | ICD-10-CM

## 2024-01-01 DIAGNOSIS — R65.21 SEPTIC SHOCK (HCC): ICD-10-CM

## 2024-01-01 DIAGNOSIS — A41.9 SEPTIC SHOCK (HCC): ICD-10-CM

## 2024-01-01 LAB
ALBUMIN SERPL BCP-MCNC: 2 G/DL (ref 3.5–5)
ALBUMIN SERPL BCP-MCNC: 3 G/DL (ref 3.5–5)
ALBUMIN SERPL BCP-MCNC: 3.3 G/DL (ref 3.5–5)
ALBUMIN SERPL BCP-MCNC: 3.3 G/DL (ref 3.5–5)
ALBUMIN SERPL BCP-MCNC: 4.1 G/DL (ref 3.5–5)
ALP SERPL-CCNC: 33 U/L (ref 34–104)
ALP SERPL-CCNC: 47 U/L (ref 34–104)
ALP SERPL-CCNC: 51 U/L (ref 34–104)
ALP SERPL-CCNC: 54 U/L (ref 34–104)
ALP SERPL-CCNC: 57 U/L (ref 34–104)
ALT SERPL W P-5'-P-CCNC: 1431 U/L (ref 7–52)
ALT SERPL W P-5'-P-CCNC: 156 U/L (ref 7–52)
ALT SERPL W P-5'-P-CCNC: 24 U/L (ref 7–52)
ALT SERPL W P-5'-P-CCNC: 3495 U/L (ref 7–52)
ALT SERPL W P-5'-P-CCNC: 8 U/L (ref 7–52)
ANION GAP SERPL CALCULATED.3IONS-SCNC: 10 MMOL/L (ref 4–13)
ANION GAP SERPL CALCULATED.3IONS-SCNC: 11 MMOL/L (ref 4–13)
ANION GAP SERPL CALCULATED.3IONS-SCNC: 13 MMOL/L (ref 4–13)
ANION GAP SERPL CALCULATED.3IONS-SCNC: 14 MMOL/L (ref 4–13)
ANION GAP SERPL CALCULATED.3IONS-SCNC: 14 MMOL/L (ref 4–13)
ANION GAP SERPL CALCULATED.3IONS-SCNC: 15 MMOL/L (ref 4–13)
ANION GAP SERPL CALCULATED.3IONS-SCNC: 19 MMOL/L (ref 4–13)
ANION GAP SERPL CALCULATED.3IONS-SCNC: 22 MMOL/L (ref 4–13)
AORTIC ROOT: 3 CM
AORTIC VALVE MEAN VELOCITY: 12 M/S
APICAL FOUR CHAMBER EJECTION FRACTION: 19 %
APTT PPP: 38 SECONDS (ref 23–37)
ASCENDING AORTA: 2.9 CM
AST SERPL W P-5'-P-CCNC: 1302 U/L (ref 13–39)
AST SERPL W P-5'-P-CCNC: 149 U/L (ref 13–39)
AST SERPL W P-5'-P-CCNC: 15 U/L (ref 13–39)
AST SERPL W P-5'-P-CCNC: 27 U/L (ref 13–39)
AST SERPL W P-5'-P-CCNC: 3494 U/L (ref 13–39)
ATRIAL RATE: 102 BPM
AV AREA BY CONTINUOUS VTI: 1.5 CM2
AV AREA PEAK VELOCITY: 1.4 CM2
AV LVOT MEAN GRADIENT: 1 MMHG
AV LVOT PEAK GRADIENT: 3 MMHG
AV MEAN GRADIENT: 7 MMHG
AV PEAK GRADIENT: 12 MMHG
AV VALVE AREA: 1.46 CM2
AV VELOCITY RATIO: 0.45
BACTERIA UR CULT: ABNORMAL
BACTERIA UR QL AUTO: ABNORMAL /HPF
BASE EX.OXY STD BLDV CALC-SCNC: 32.9 % (ref 60–80)
BASE EX.OXY STD BLDV CALC-SCNC: 33.5 % (ref 60–80)
BASE EX.OXY STD BLDV CALC-SCNC: 37.2 % (ref 60–80)
BASE EX.OXY STD BLDV CALC-SCNC: 58.7 % (ref 60–80)
BASE EX.OXY STD BLDV CALC-SCNC: 60.1 % (ref 60–80)
BASE EX.OXY STD BLDV CALC-SCNC: 62.2 % (ref 60–80)
BASE EX.OXY STD BLDV CALC-SCNC: 69.8 % (ref 60–80)
BASE EXCESS BLDV CALC-SCNC: -10.7 MMOL/L
BASE EXCESS BLDV CALC-SCNC: -15.4 MMOL/L
BASE EXCESS BLDV CALC-SCNC: -21.9 MMOL/L
BASE EXCESS BLDV CALC-SCNC: -4.5 MMOL/L
BASE EXCESS BLDV CALC-SCNC: -4.8 MMOL/L
BASE EXCESS BLDV CALC-SCNC: -5.1 MMOL/L
BASE EXCESS BLDV CALC-SCNC: -9.6 MMOL/L
BASOPHILS # BLD AUTO: 0.02 THOUSANDS/ÂΜL (ref 0–0.1)
BASOPHILS # BLD AUTO: 0.03 THOUSANDS/ÂΜL (ref 0–0.1)
BASOPHILS # BLD AUTO: 0.03 THOUSANDS/ÂΜL (ref 0–0.1)
BASOPHILS NFR BLD AUTO: 0 % (ref 0–1)
BILIRUB SERPL-MCNC: 0.8 MG/DL (ref 0.2–1)
BILIRUB SERPL-MCNC: 0.94 MG/DL (ref 0.2–1)
BILIRUB SERPL-MCNC: 1.18 MG/DL (ref 0.2–1)
BILIRUB SERPL-MCNC: 1.4 MG/DL (ref 0.2–1)
BILIRUB SERPL-MCNC: 1.59 MG/DL (ref 0.2–1)
BILIRUB UR QL STRIP: NEGATIVE
BSA FOR ECHO PROCEDURE: 1.81 M2
BUDDING YEAST: PRESENT
BUN SERPL-MCNC: 17 MG/DL (ref 5–25)
BUN SERPL-MCNC: 22 MG/DL (ref 5–25)
BUN SERPL-MCNC: 25 MG/DL (ref 5–25)
BUN SERPL-MCNC: 29 MG/DL (ref 5–25)
BUN SERPL-MCNC: 32 MG/DL (ref 5–25)
BUN SERPL-MCNC: 35 MG/DL (ref 5–25)
BUN SERPL-MCNC: 37 MG/DL (ref 5–25)
BUN SERPL-MCNC: 37 MG/DL (ref 5–25)
CALCIUM ALBUM COR SERPL-MCNC: 6.3 MG/DL (ref 8.3–10.1)
CALCIUM ALBUM COR SERPL-MCNC: 8 MG/DL (ref 8.3–10.1)
CALCIUM ALBUM COR SERPL-MCNC: 8.3 MG/DL (ref 8.3–10.1)
CALCIUM ALBUM COR SERPL-MCNC: 8.3 MG/DL (ref 8.3–10.1)
CALCIUM SERPL-MCNC: 4.7 MG/DL (ref 8.4–10.2)
CALCIUM SERPL-MCNC: 7.2 MG/DL (ref 8.4–10.2)
CALCIUM SERPL-MCNC: 7.2 MG/DL (ref 8.4–10.2)
CALCIUM SERPL-MCNC: 7.3 MG/DL (ref 8.4–10.2)
CALCIUM SERPL-MCNC: 7.4 MG/DL (ref 8.4–10.2)
CALCIUM SERPL-MCNC: 7.7 MG/DL (ref 8.4–10.2)
CALCIUM SERPL-MCNC: 7.7 MG/DL (ref 8.4–10.2)
CALCIUM SERPL-MCNC: 9.1 MG/DL (ref 8.4–10.2)
CHLORIDE SERPL-SCNC: 100 MMOL/L (ref 96–108)
CHLORIDE SERPL-SCNC: 102 MMOL/L (ref 96–108)
CHLORIDE SERPL-SCNC: 103 MMOL/L (ref 96–108)
CHLORIDE SERPL-SCNC: 103 MMOL/L (ref 96–108)
CHLORIDE SERPL-SCNC: 104 MMOL/L (ref 96–108)
CHLORIDE SERPL-SCNC: 105 MMOL/L (ref 96–108)
CHLORIDE SERPL-SCNC: 105 MMOL/L (ref 96–108)
CHLORIDE SERPL-SCNC: 119 MMOL/L (ref 96–108)
CLARITY UR: ABNORMAL
CO2 SERPL-SCNC: 13 MMOL/L (ref 21–32)
CO2 SERPL-SCNC: 16 MMOL/L (ref 21–32)
CO2 SERPL-SCNC: 18 MMOL/L (ref 21–32)
CO2 SERPL-SCNC: 21 MMOL/L (ref 21–32)
CO2 SERPL-SCNC: 22 MMOL/L (ref 21–32)
CO2 SERPL-SCNC: 23 MMOL/L (ref 21–32)
CO2 SERPL-SCNC: 23 MMOL/L (ref 21–32)
CO2 SERPL-SCNC: 9 MMOL/L (ref 21–32)
COLOR UR: YELLOW
CREAT SERPL-MCNC: 0.99 MG/DL (ref 0.6–1.3)
CREAT SERPL-MCNC: 1.45 MG/DL (ref 0.6–1.3)
CREAT SERPL-MCNC: 1.89 MG/DL (ref 0.6–1.3)
CREAT SERPL-MCNC: 2.2 MG/DL (ref 0.6–1.3)
CREAT SERPL-MCNC: 2.23 MG/DL (ref 0.6–1.3)
CREAT SERPL-MCNC: 2.46 MG/DL (ref 0.6–1.3)
CREAT SERPL-MCNC: 2.89 MG/DL (ref 0.6–1.3)
CREAT SERPL-MCNC: 3.1 MG/DL (ref 0.6–1.3)
DOP CALC AO PEAK VEL: 1.76 M/S
DOP CALC AO VTI: 24.16 CM
DOP CALC LVOT AREA: 3.14 CM2
DOP CALC LVOT CARDIAC INDEX: 1.68 L/MIN/M2
DOP CALC LVOT CARDIAC OUTPUT: 3.04 L/MIN
DOP CALC LVOT DIAMETER: 2 CM
DOP CALC LVOT PEAK VEL VTI: 11.24 CM
DOP CALC LVOT PEAK VEL: 0.8 M/S
DOP CALC LVOT STROKE INDEX: 18.8 ML/M2
DOP CALC LVOT STROKE VOLUME: 35.29
E WAVE DECELERATION TIME: 114 MS
E/A RATIO: 0.87
EOSINOPHIL # BLD AUTO: 0.01 THOUSAND/ÂΜL (ref 0–0.61)
EOSINOPHIL # BLD AUTO: 0.02 THOUSAND/ÂΜL (ref 0–0.61)
EOSINOPHIL # BLD AUTO: 0.17 THOUSAND/ÂΜL (ref 0–0.61)
EOSINOPHIL NFR BLD AUTO: 0 % (ref 0–6)
EOSINOPHIL NFR BLD AUTO: 0 % (ref 0–6)
EOSINOPHIL NFR BLD AUTO: 1 % (ref 0–6)
ERYTHROCYTE [DISTWIDTH] IN BLOOD BY AUTOMATED COUNT: 13.5 % (ref 11.6–15.1)
ERYTHROCYTE [DISTWIDTH] IN BLOOD BY AUTOMATED COUNT: 13.7 % (ref 11.6–15.1)
ERYTHROCYTE [DISTWIDTH] IN BLOOD BY AUTOMATED COUNT: 14 % (ref 11.6–15.1)
EST. AVERAGE GLUCOSE BLD GHB EST-MCNC: 140 MG/DL
FLUAV RNA RESP QL NAA+PROBE: NEGATIVE
FLUBV RNA RESP QL NAA+PROBE: NEGATIVE
FRACTIONAL SHORTENING: 23 (ref 28–44)
GFR SERPL CREATININE-BSD FRML MDRD: 16 ML/MIN/1.73SQ M
GFR SERPL CREATININE-BSD FRML MDRD: 17 ML/MIN/1.73SQ M
GFR SERPL CREATININE-BSD FRML MDRD: 21 ML/MIN/1.73SQ M
GFR SERPL CREATININE-BSD FRML MDRD: 23 ML/MIN/1.73SQ M
GFR SERPL CREATININE-BSD FRML MDRD: 24 ML/MIN/1.73SQ M
GFR SERPL CREATININE-BSD FRML MDRD: 29 ML/MIN/1.73SQ M
GFR SERPL CREATININE-BSD FRML MDRD: 40 ML/MIN/1.73SQ M
GFR SERPL CREATININE-BSD FRML MDRD: 64 ML/MIN/1.73SQ M
GLUCOSE SERPL-MCNC: 125 MG/DL (ref 65–140)
GLUCOSE SERPL-MCNC: 142 MG/DL (ref 65–140)
GLUCOSE SERPL-MCNC: 170 MG/DL (ref 65–140)
GLUCOSE SERPL-MCNC: 174 MG/DL (ref 65–140)
GLUCOSE SERPL-MCNC: 188 MG/DL (ref 65–140)
GLUCOSE SERPL-MCNC: 202 MG/DL (ref 65–140)
GLUCOSE SERPL-MCNC: 204 MG/DL (ref 65–140)
GLUCOSE SERPL-MCNC: 246 MG/DL (ref 65–140)
GLUCOSE SERPL-MCNC: 262 MG/DL (ref 65–140)
GLUCOSE SERPL-MCNC: 286 MG/DL (ref 65–140)
GLUCOSE SERPL-MCNC: 289 MG/DL (ref 65–140)
GLUCOSE SERPL-MCNC: 299 MG/DL (ref 65–140)
GLUCOSE UR STRIP-MCNC: NEGATIVE MG/DL
HBA1C MFR BLD: 6.5 %
HCO3 BLDV-SCNC: 12.8 MMOL/L (ref 24–30)
HCO3 BLDV-SCNC: 15.5 MMOL/L (ref 24–30)
HCO3 BLDV-SCNC: 16.5 MMOL/L (ref 24–30)
HCO3 BLDV-SCNC: 19.1 MMOL/L (ref 24–30)
HCO3 BLDV-SCNC: 19.2 MMOL/L (ref 24–30)
HCO3 BLDV-SCNC: 19.3 MMOL/L (ref 24–30)
HCO3 BLDV-SCNC: 9.8 MMOL/L (ref 24–30)
HCT VFR BLD AUTO: 30.4 % (ref 36.5–49.3)
HCT VFR BLD AUTO: 34.1 % (ref 36.5–49.3)
HCT VFR BLD AUTO: 35 % (ref 36.5–49.3)
HGB BLD-MCNC: 10.2 G/DL (ref 12–17)
HGB BLD-MCNC: 10.8 G/DL (ref 12–17)
HGB BLD-MCNC: 12 G/DL (ref 12–17)
HGB UR QL STRIP.AUTO: ABNORMAL
IMM GRANULOCYTES # BLD AUTO: 0.07 THOUSAND/UL (ref 0–0.2)
IMM GRANULOCYTES # BLD AUTO: 0.22 THOUSAND/UL (ref 0–0.2)
IMM GRANULOCYTES # BLD AUTO: 0.23 THOUSAND/UL (ref 0–0.2)
IMM GRANULOCYTES NFR BLD AUTO: 1 % (ref 0–2)
INR PPP: 1.39 (ref 0.84–1.19)
INTERVENTRICULAR SEPTUM IN DIASTOLE (PARASTERNAL SHORT AXIS VIEW): 1.2 CM
INTERVENTRICULAR SEPTUM: 1.2 CM (ref 0.6–1.1)
IPAP: 14
KETONES UR STRIP-MCNC: NEGATIVE MG/DL
LAAS-AP2: 19.1 CM2
LAAS-AP4: 23.5 CM2
LACTATE SERPL-SCNC: 10.3 MMOL/L (ref 0.5–2)
LACTATE SERPL-SCNC: 12.4 MMOL/L (ref 0.5–2)
LACTATE SERPL-SCNC: 3.4 MMOL/L (ref 0.5–2)
LACTATE SERPL-SCNC: 3.6 MMOL/L (ref 0.5–2)
LACTATE SERPL-SCNC: 4.3 MMOL/L (ref 0.5–2)
LACTATE SERPL-SCNC: 5.1 MMOL/L (ref 0.5–2)
LACTATE SERPL-SCNC: 6.6 MMOL/L (ref 0.5–2)
LACTATE SERPL-SCNC: 8.6 MMOL/L (ref 0.5–2)
LEFT ATRIUM AREA SYSTOLE SINGLE PLANE A4C: 24.4 CM2
LEFT ATRIUM SIZE: 4.6 CM
LEFT ATRIUM VOLUME (MOD BIPLANE): 69 ML
LEFT ATRIUM VOLUME INDEX (MOD BIPLANE): 38.1 ML/M2
LEFT INTERNAL DIMENSION IN SYSTOLE: 3.4 CM (ref 2.1–4)
LEFT VENTRICULAR INTERNAL DIMENSION IN DIASTOLE: 4.4 CM (ref 3.5–6)
LEFT VENTRICULAR POSTERIOR WALL IN END DIASTOLE: 1.2 CM
LEFT VENTRICULAR STROKE VOLUME: 39 ML
LEUKOCYTE ESTERASE UR QL STRIP: ABNORMAL
LVSV (TEICH): 39 ML
LYMPHOCYTES # BLD AUTO: 0.75 THOUSANDS/ÂΜL (ref 0.6–4.47)
LYMPHOCYTES # BLD AUTO: 0.97 THOUSANDS/ÂΜL (ref 0.6–4.47)
LYMPHOCYTES # BLD AUTO: 1.15 THOUSANDS/ÂΜL (ref 0.6–4.47)
LYMPHOCYTES NFR BLD AUTO: 5 % (ref 14–44)
LYMPHOCYTES NFR BLD AUTO: 6 % (ref 14–44)
LYMPHOCYTES NFR BLD AUTO: 6 % (ref 14–44)
MAGNESIUM SERPL-MCNC: 1.9 MG/DL (ref 1.9–2.7)
MAGNESIUM SERPL-MCNC: 1.9 MG/DL (ref 1.9–2.7)
MAGNESIUM SERPL-MCNC: 2.2 MG/DL (ref 1.9–2.7)
MAGNESIUM SERPL-MCNC: 2.3 MG/DL (ref 1.9–2.7)
MCH RBC QN AUTO: 34.3 PG (ref 26.8–34.3)
MCH RBC QN AUTO: 34.3 PG (ref 26.8–34.3)
MCH RBC QN AUTO: 34.5 PG (ref 26.8–34.3)
MCHC RBC AUTO-ENTMCNC: 31.7 G/DL (ref 31.4–37.4)
MCHC RBC AUTO-ENTMCNC: 33.6 G/DL (ref 31.4–37.4)
MCHC RBC AUTO-ENTMCNC: 34.3 G/DL (ref 31.4–37.4)
MCV RBC AUTO: 101 FL (ref 82–98)
MCV RBC AUTO: 102 FL (ref 82–98)
MCV RBC AUTO: 108 FL (ref 82–98)
MONOCYTES # BLD AUTO: 0.87 THOUSAND/ÂΜL (ref 0.17–1.22)
MONOCYTES # BLD AUTO: 1.34 THOUSAND/ÂΜL (ref 0.17–1.22)
MONOCYTES # BLD AUTO: 1.38 THOUSAND/ÂΜL (ref 0.17–1.22)
MONOCYTES NFR BLD AUTO: 6 % (ref 4–12)
MONOCYTES NFR BLD AUTO: 6 % (ref 4–12)
MONOCYTES NFR BLD AUTO: 7 % (ref 4–12)
MV E'TISSUE VEL-LAT: 11 CM/S
MV E'TISSUE VEL-SEP: 5 CM/S
MV PEAK A VEL: 0.97 M/S
MV PEAK E VEL: 84 CM/S
MV STENOSIS PRESSURE HALF TIME: 33 MS
MV VALVE AREA P 1/2 METHOD: 6.67
NASAL CANNULA: 2
NASAL CANNULA: 6
NEUTROPHILS # BLD AUTO: 11.87 THOUSANDS/ÂΜL (ref 1.85–7.62)
NEUTROPHILS # BLD AUTO: 16.41 THOUSANDS/ÂΜL (ref 1.85–7.62)
NEUTROPHILS # BLD AUTO: 18.15 THOUSANDS/ÂΜL (ref 1.85–7.62)
NEUTS SEG NFR BLD AUTO: 86 % (ref 43–75)
NEUTS SEG NFR BLD AUTO: 87 % (ref 43–75)
NEUTS SEG NFR BLD AUTO: 87 % (ref 43–75)
NITRITE UR QL STRIP: NEGATIVE
NITRITE UR QL STRIP: POSITIVE
NITRITE UR QL STRIP: POSITIVE
NON VENT- BIPAP: ABNORMAL
NON-SQ EPI CELLS URNS QL MICRO: ABNORMAL /HPF
NRBC BLD AUTO-RTO: 0 /100 WBCS
O2 CT BLDV-SCNC: 10.6 ML/DL
O2 CT BLDV-SCNC: 5.3 ML/DL
O2 CT BLDV-SCNC: 5.4 ML/DL
O2 CT BLDV-SCNC: 5.7 ML/DL
O2 CT BLDV-SCNC: 8.3 ML/DL
O2 CT BLDV-SCNC: 8.5 ML/DL
O2 CT BLDV-SCNC: 9.3 ML/DL
P AXIS: 45 DEGREES
PCO2 BLDV: 30.8 MM HG (ref 42–50)
PCO2 BLDV: 31.2 MM HG (ref 42–50)
PCO2 BLDV: 31.5 MM HG (ref 42–50)
PCO2 BLDV: 32.9 MM HG (ref 42–50)
PCO2 BLDV: 38.6 MM HG (ref 42–50)
PCO2 BLDV: 41.9 MM HG (ref 42–50)
PCO2 BLDV: 46.8 MM HG (ref 42–50)
PEEP MAX SETTING VENT: 6 CM[H2O]
PH BLDV: 6.94 [PH] (ref 7.3–7.4)
PH BLDV: 7.14 [PH] (ref 7.3–7.4)
PH BLDV: 7.21 [PH] (ref 7.3–7.4)
PH BLDV: 7.31 [PH] (ref 7.3–7.4)
PH BLDV: 7.38 [PH] (ref 7.3–7.4)
PH BLDV: 7.4 [PH] (ref 7.3–7.4)
PH BLDV: 7.41 [PH] (ref 7.3–7.4)
PH UR STRIP.AUTO: 6 [PH]
PHOSPHATE SERPL-MCNC: 7.7 MG/DL (ref 2.3–4.1)
PLATELET # BLD AUTO: 175 THOUSANDS/UL (ref 149–390)
PLATELET # BLD AUTO: 197 THOUSANDS/UL (ref 149–390)
PLATELET # BLD AUTO: 92 THOUSANDS/UL (ref 149–390)
PMV BLD AUTO: 9.3 FL (ref 8.9–12.7)
PMV BLD AUTO: 9.4 FL (ref 8.9–12.7)
PMV BLD AUTO: 9.6 FL (ref 8.9–12.7)
PO2 BLDV: 27.3 MM HG (ref 35–45)
PO2 BLDV: 28.3 MM HG (ref 35–45)
PO2 BLDV: 32.7 MM HG (ref 35–45)
PO2 BLDV: 33.9 MM HG (ref 35–45)
PO2 BLDV: 34.3 MM HG (ref 35–45)
PO2 BLDV: 36.1 MM HG (ref 35–45)
PO2 BLDV: 40.9 MM HG (ref 35–45)
POTASSIUM SERPL-SCNC: 3.6 MMOL/L (ref 3.5–5.3)
POTASSIUM SERPL-SCNC: 3.8 MMOL/L (ref 3.5–5.3)
POTASSIUM SERPL-SCNC: 4 MMOL/L (ref 3.5–5.3)
POTASSIUM SERPL-SCNC: 4.1 MMOL/L (ref 3.5–5.3)
POTASSIUM SERPL-SCNC: 4.5 MMOL/L (ref 3.5–5.3)
POTASSIUM SERPL-SCNC: 4.5 MMOL/L (ref 3.5–5.3)
POTASSIUM SERPL-SCNC: 4.7 MMOL/L (ref 3.5–5.3)
POTASSIUM SERPL-SCNC: 5.4 MMOL/L (ref 3.5–5.3)
PR INTERVAL: 152 MS
PROCALCITONIN SERPL-MCNC: 0.3 NG/ML
PROT SERPL-MCNC: 3.3 G/DL (ref 6.4–8.4)
PROT SERPL-MCNC: 5 G/DL (ref 6.4–8.4)
PROT SERPL-MCNC: 5.4 G/DL (ref 6.4–8.4)
PROT SERPL-MCNC: 5.8 G/DL (ref 6.4–8.4)
PROT SERPL-MCNC: 6.9 G/DL (ref 6.4–8.4)
PROT UR STRIP-MCNC: ABNORMAL MG/DL
PROTHROMBIN TIME: 17.8 SECONDS (ref 11.6–14.5)
QRS AXIS: -41 DEGREES
QRSD INTERVAL: 180 MS
QT INTERVAL: 392 MS
QTC INTERVAL: 510 MS
RBC # BLD AUTO: 2.97 MILLION/UL (ref 3.88–5.62)
RBC # BLD AUTO: 3.15 MILLION/UL (ref 3.88–5.62)
RBC # BLD AUTO: 3.48 MILLION/UL (ref 3.88–5.62)
RBC #/AREA URNS AUTO: ABNORMAL /HPF
RIGHT ATRIUM AREA SYSTOLE A4C: 15.5 CM2
RIGHT VENTRICLE ID DIMENSION: 4.4 CM
RSV RNA RESP QL NAA+PROBE: NEGATIVE
SARS-COV-2 RNA RESP QL NAA+PROBE: NEGATIVE
SL CV LEFT ATRIUM LENGTH A2C: 5.3 CM
SL CV LV EF: 35
SL CV PED ECHO LEFT VENTRICLE DIASTOLIC VOLUME (MOD BIPLANE) 2D: 88 ML
SL CV PED ECHO LEFT VENTRICLE SYSTOLIC VOLUME (MOD BIPLANE) 2D: 48 ML
SODIUM SERPL-SCNC: 133 MMOL/L (ref 135–147)
SODIUM SERPL-SCNC: 133 MMOL/L (ref 135–147)
SODIUM SERPL-SCNC: 138 MMOL/L (ref 135–147)
SODIUM SERPL-SCNC: 138 MMOL/L (ref 135–147)
SODIUM SERPL-SCNC: 140 MMOL/L (ref 135–147)
SODIUM SERPL-SCNC: 140 MMOL/L (ref 135–147)
SODIUM SERPL-SCNC: 141 MMOL/L (ref 135–147)
SODIUM SERPL-SCNC: 141 MMOL/L (ref 135–147)
SP GR UR STRIP.AUTO: 1.01 (ref 1–1.03)
SP GR UR STRIP.AUTO: 1.01 (ref 1–1.03)
SP GR UR STRIP.AUTO: 1.02 (ref 1–1.03)
T WAVE AXIS: 126 DEGREES
TR MAX PG: 26 MMHG
TR PEAK VELOCITY: 2.5 M/S
TRICUSPID ANNULAR PLANE SYSTOLIC EXCURSION: 1.3 CM
TRICUSPID VALVE PEAK REGURGITATION VELOCITY: 2.53 M/S
UROBILINOGEN UR STRIP-ACNC: <2 MG/DL
VENT BIPAP FIO2: 40 %
VENT BIPAP FIO2: 40 %
VENT BIPAP FIO2: 44 %
VENTRICULAR RATE: 102 BPM
WBC # BLD AUTO: 13.76 THOUSAND/UL (ref 4.31–10.16)
WBC # BLD AUTO: 19.02 THOUSAND/UL (ref 4.31–10.16)
WBC # BLD AUTO: 20.91 THOUSAND/UL (ref 4.31–10.16)
WBC #/AREA URNS AUTO: ABNORMAL /HPF
WBC CLUMPS # UR AUTO: PRESENT /UL
WBC CLUMPS # UR AUTO: PRESENT /UL

## 2024-01-01 PROCEDURE — 87077 CULTURE AEROBIC IDENTIFY: CPT | Performed by: EMERGENCY MEDICINE

## 2024-01-01 PROCEDURE — 94003 VENT MGMT INPAT SUBQ DAY: CPT

## 2024-01-01 PROCEDURE — 80053 COMPREHEN METABOLIC PANEL: CPT | Performed by: NURSE PRACTITIONER

## 2024-01-01 PROCEDURE — 96368 THER/DIAG CONCURRENT INF: CPT

## 2024-01-01 PROCEDURE — 99285 EMERGENCY DEPT VISIT HI MDM: CPT

## 2024-01-01 PROCEDURE — 94760 N-INVAS EAR/PLS OXIMETRY 1: CPT

## 2024-01-01 PROCEDURE — 87077 CULTURE AEROBIC IDENTIFY: CPT | Performed by: PHYSICIAN ASSISTANT

## 2024-01-01 PROCEDURE — 80053 COMPREHEN METABOLIC PANEL: CPT | Performed by: PHYSICIAN ASSISTANT

## 2024-01-01 PROCEDURE — 99238 HOSP IP/OBS DSCHRG MGMT 30/<: CPT | Performed by: NURSE PRACTITIONER

## 2024-01-01 PROCEDURE — 83605 ASSAY OF LACTIC ACID: CPT | Performed by: EMERGENCY MEDICINE

## 2024-01-01 PROCEDURE — 85730 THROMBOPLASTIN TIME PARTIAL: CPT | Performed by: EMERGENCY MEDICINE

## 2024-01-01 PROCEDURE — 96366 THER/PROPH/DIAG IV INF ADDON: CPT

## 2024-01-01 PROCEDURE — 80048 BASIC METABOLIC PNL TOTAL CA: CPT

## 2024-01-01 PROCEDURE — 71250 CT THORAX DX C-: CPT

## 2024-01-01 PROCEDURE — 36556 INSERT NON-TUNNEL CV CATH: CPT | Performed by: EMERGENCY MEDICINE

## 2024-01-01 PROCEDURE — 87086 URINE CULTURE/COLONY COUNT: CPT | Performed by: EMERGENCY MEDICINE

## 2024-01-01 PROCEDURE — 84100 ASSAY OF PHOSPHORUS: CPT | Performed by: NURSE PRACTITIONER

## 2024-01-01 PROCEDURE — 93306 TTE W/DOPPLER COMPLETE: CPT | Performed by: INTERNAL MEDICINE

## 2024-01-01 PROCEDURE — 99291 CRITICAL CARE FIRST HOUR: CPT | Performed by: ANESTHESIOLOGY

## 2024-01-01 PROCEDURE — 83605 ASSAY OF LACTIC ACID: CPT | Performed by: NURSE PRACTITIONER

## 2024-01-01 PROCEDURE — 85025 COMPLETE CBC W/AUTO DIFF WBC: CPT | Performed by: PHYSICIAN ASSISTANT

## 2024-01-01 PROCEDURE — 93005 ELECTROCARDIOGRAM TRACING: CPT

## 2024-01-01 PROCEDURE — 76937 US GUIDE VASCULAR ACCESS: CPT | Performed by: EMERGENCY MEDICINE

## 2024-01-01 PROCEDURE — 85025 COMPLETE CBC W/AUTO DIFF WBC: CPT | Performed by: NURSE PRACTITIONER

## 2024-01-01 PROCEDURE — 0241U HB NFCT DS VIR RESP RNA 4 TRGT: CPT | Performed by: PHYSICIAN ASSISTANT

## 2024-01-01 PROCEDURE — 87086 URINE CULTURE/COLONY COUNT: CPT | Performed by: PHYSICIAN ASSISTANT

## 2024-01-01 PROCEDURE — 87040 BLOOD CULTURE FOR BACTERIA: CPT | Performed by: EMERGENCY MEDICINE

## 2024-01-01 PROCEDURE — C8929 TTE W OR WO FOL WCON,DOPPLER: HCPCS

## 2024-01-01 PROCEDURE — 02HV33Z INSERTION OF INFUSION DEVICE INTO SUPERIOR VENA CAVA, PERCUTANEOUS APPROACH: ICD-10-PCS | Performed by: EMERGENCY MEDICINE

## 2024-01-01 PROCEDURE — 94002 VENT MGMT INPAT INIT DAY: CPT

## 2024-01-01 PROCEDURE — 99223 1ST HOSP IP/OBS HIGH 75: CPT | Performed by: NURSE PRACTITIONER

## 2024-01-01 PROCEDURE — 99291 CRITICAL CARE FIRST HOUR: CPT | Performed by: EMERGENCY MEDICINE

## 2024-01-01 PROCEDURE — 84145 PROCALCITONIN (PCT): CPT | Performed by: EMERGENCY MEDICINE

## 2024-01-01 PROCEDURE — 96361 HYDRATE IV INFUSION ADD-ON: CPT

## 2024-01-01 PROCEDURE — 83735 ASSAY OF MAGNESIUM: CPT

## 2024-01-01 PROCEDURE — 87186 SC STD MICRODIL/AGAR DIL: CPT | Performed by: PHYSICIAN ASSISTANT

## 2024-01-01 PROCEDURE — 83735 ASSAY OF MAGNESIUM: CPT | Performed by: NURSE PRACTITIONER

## 2024-01-01 PROCEDURE — 83036 HEMOGLOBIN GLYCOSYLATED A1C: CPT | Performed by: NURSE PRACTITIONER

## 2024-01-01 PROCEDURE — 87186 SC STD MICRODIL/AGAR DIL: CPT | Performed by: EMERGENCY MEDICINE

## 2024-01-01 PROCEDURE — 81001 URINALYSIS AUTO W/SCOPE: CPT | Performed by: EMERGENCY MEDICINE

## 2024-01-01 PROCEDURE — 80053 COMPREHEN METABOLIC PANEL: CPT

## 2024-01-01 PROCEDURE — 99284 EMERGENCY DEPT VISIT MOD MDM: CPT | Performed by: PHYSICIAN ASSISTANT

## 2024-01-01 PROCEDURE — 74176 CT ABD & PELVIS W/O CONTRAST: CPT

## 2024-01-01 PROCEDURE — 80053 COMPREHEN METABOLIC PANEL: CPT | Performed by: EMERGENCY MEDICINE

## 2024-01-01 PROCEDURE — 81001 URINALYSIS AUTO W/SCOPE: CPT | Performed by: PHYSICIAN ASSISTANT

## 2024-01-01 PROCEDURE — 36415 COLL VENOUS BLD VENIPUNCTURE: CPT | Performed by: EMERGENCY MEDICINE

## 2024-01-01 PROCEDURE — 82805 BLOOD GASES W/O2 SATURATION: CPT | Performed by: NURSE PRACTITIONER

## 2024-01-01 PROCEDURE — 85610 PROTHROMBIN TIME: CPT | Performed by: EMERGENCY MEDICINE

## 2024-01-01 PROCEDURE — 85025 COMPLETE CBC W/AUTO DIFF WBC: CPT | Performed by: EMERGENCY MEDICINE

## 2024-01-01 PROCEDURE — 83605 ASSAY OF LACTIC ACID: CPT

## 2024-01-01 PROCEDURE — 87154 CUL TYP ID BLD PTHGN 6+ TRGT: CPT | Performed by: EMERGENCY MEDICINE

## 2024-01-01 PROCEDURE — 76705 ECHO EXAM OF ABDOMEN: CPT

## 2024-01-01 PROCEDURE — 82948 REAGENT STRIP/BLOOD GLUCOSE: CPT

## 2024-01-01 PROCEDURE — 81001 URINALYSIS AUTO W/SCOPE: CPT

## 2024-01-01 PROCEDURE — 80048 BASIC METABOLIC PNL TOTAL CA: CPT | Performed by: NURSE PRACTITIONER

## 2024-01-01 PROCEDURE — 96365 THER/PROPH/DIAG IV INF INIT: CPT

## 2024-01-01 PROCEDURE — 82805 BLOOD GASES W/O2 SATURATION: CPT

## 2024-01-01 RX ORDER — SODIUM CHLORIDE, SODIUM GLUCONATE, SODIUM ACETATE, POTASSIUM CHLORIDE, MAGNESIUM CHLORIDE, SODIUM PHOSPHATE, DIBASIC, AND POTASSIUM PHOSPHATE .53; .5; .37; .037; .03; .012; .00082 G/100ML; G/100ML; G/100ML; G/100ML; G/100ML; G/100ML; G/100ML
125 INJECTION, SOLUTION INTRAVENOUS CONTINUOUS
Status: DISCONTINUED | OUTPATIENT
Start: 2024-01-01 | End: 2024-01-01

## 2024-01-01 RX ORDER — FENTANYL CITRATE 50 UG/ML
100 INJECTION, SOLUTION INTRAMUSCULAR; INTRAVENOUS ONCE
Status: COMPLETED | OUTPATIENT
Start: 2024-01-01 | End: 2024-01-01

## 2024-01-01 RX ORDER — CALCIUM CHLORIDE 100 MG/ML
INJECTION INTRAVENOUS; INTRAVENTRICULAR
Status: COMPLETED
Start: 2024-01-01 | End: 2024-01-01

## 2024-01-01 RX ORDER — FENTANYL CITRATE 50 UG/ML
INJECTION, SOLUTION INTRAMUSCULAR; INTRAVENOUS
Status: COMPLETED
Start: 2024-01-01 | End: 2024-01-01

## 2024-01-01 RX ORDER — METRONIDAZOLE 500 MG/100ML
500 INJECTION, SOLUTION INTRAVENOUS EVERY 8 HOURS
Status: DISCONTINUED | OUTPATIENT
Start: 2024-01-01 | End: 2024-01-01 | Stop reason: HOSPADM

## 2024-01-01 RX ORDER — FUROSEMIDE 10 MG/ML
40 INJECTION INTRAMUSCULAR; INTRAVENOUS ONCE
Status: COMPLETED | OUTPATIENT
Start: 2024-01-01 | End: 2024-01-01

## 2024-01-01 RX ORDER — MAGNESIUM SULFATE 1 G/100ML
1 INJECTION INTRAVENOUS ONCE
Status: COMPLETED | OUTPATIENT
Start: 2024-01-01 | End: 2024-01-01

## 2024-01-01 RX ORDER — LIDOCAINE HCL/PF 100 MG/5ML
SYRINGE (ML) INJECTION
Status: COMPLETED
Start: 2024-01-01 | End: 2024-01-01

## 2024-01-01 RX ORDER — ALBUMIN (HUMAN) 12.5 G/50ML
50 SOLUTION INTRAVENOUS ONCE
Status: COMPLETED | OUTPATIENT
Start: 2024-01-01 | End: 2024-01-01

## 2024-01-01 RX ORDER — ONDANSETRON 2 MG/ML
1 INJECTION INTRAMUSCULAR; INTRAVENOUS ONCE
Status: COMPLETED | OUTPATIENT
Start: 2024-01-01 | End: 2024-01-01

## 2024-01-01 RX ORDER — LEVETIRACETAM 500 MG/5ML
250 INJECTION, SOLUTION, CONCENTRATE INTRAVENOUS EVERY 12 HOURS SCHEDULED
Status: DISCONTINUED | OUTPATIENT
Start: 2024-01-01 | End: 2024-01-01 | Stop reason: HOSPADM

## 2024-01-01 RX ORDER — FENTANYL CITRATE 50 UG/ML
50 INJECTION, SOLUTION INTRAMUSCULAR; INTRAVENOUS ONCE
Status: DISCONTINUED | OUTPATIENT
Start: 2024-01-01 | End: 2024-01-01

## 2024-01-01 RX ORDER — FENTANYL CITRATE 50 UG/ML
INJECTION, SOLUTION INTRAMUSCULAR; INTRAVENOUS
Status: DISCONTINUED
Start: 2024-01-01 | End: 2024-01-01 | Stop reason: WASHOUT

## 2024-01-01 RX ORDER — PANTOPRAZOLE SODIUM 40 MG/1
40 TABLET, DELAYED RELEASE ORAL DAILY
Status: DISCONTINUED | OUTPATIENT
Start: 2024-01-01 | End: 2024-01-01

## 2024-01-01 RX ORDER — MIDAZOLAM HYDROCHLORIDE 2 MG/2ML
INJECTION, SOLUTION INTRAMUSCULAR; INTRAVENOUS
Status: COMPLETED
Start: 2024-01-01 | End: 2024-01-01

## 2024-01-01 RX ORDER — HEPARIN SODIUM 5000 [USP'U]/ML
5000 INJECTION, SOLUTION INTRAVENOUS; SUBCUTANEOUS EVERY 8 HOURS SCHEDULED
Status: DISCONTINUED | OUTPATIENT
Start: 2024-01-01 | End: 2024-01-01 | Stop reason: HOSPADM

## 2024-01-01 RX ORDER — SODIUM CHLORIDE, SODIUM GLUCONATE, SODIUM ACETATE, POTASSIUM CHLORIDE, MAGNESIUM CHLORIDE, SODIUM PHOSPHATE, DIBASIC, AND POTASSIUM PHOSPHATE .53; .5; .37; .037; .03; .012; .00082 G/100ML; G/100ML; G/100ML; G/100ML; G/100ML; G/100ML; G/100ML
1000 INJECTION, SOLUTION INTRAVENOUS ONCE
Status: COMPLETED | OUTPATIENT
Start: 2024-01-01 | End: 2024-01-01

## 2024-01-01 RX ORDER — LIDOCAINE HYDROCHLORIDE 20 MG/ML
1 JELLY TOPICAL ONCE
Status: COMPLETED | OUTPATIENT
Start: 2024-01-01 | End: 2024-01-01

## 2024-01-01 RX ORDER — TAMSULOSIN HYDROCHLORIDE 0.4 MG/1
0.4 CAPSULE ORAL DAILY
Status: DISCONTINUED | OUTPATIENT
Start: 2024-01-01 | End: 2024-01-01

## 2024-01-01 RX ORDER — INSULIN LISPRO 100 [IU]/ML
1-6 INJECTION, SOLUTION INTRAVENOUS; SUBCUTANEOUS EVERY 6 HOURS SCHEDULED
Status: DISCONTINUED | OUTPATIENT
Start: 2024-01-01 | End: 2024-01-01 | Stop reason: HOSPADM

## 2024-01-01 RX ORDER — MILRINONE LACTATE 0.2 MG/ML
0.25 INJECTION, SOLUTION INTRAVENOUS CONTINUOUS
Status: DISCONTINUED | OUTPATIENT
Start: 2024-01-01 | End: 2024-01-01

## 2024-01-01 RX ORDER — MIDAZOLAM HYDROCHLORIDE 2 MG/2ML
INJECTION, SOLUTION INTRAMUSCULAR; INTRAVENOUS
Status: DISCONTINUED
Start: 2024-01-01 | End: 2024-01-01 | Stop reason: WASHOUT

## 2024-01-01 RX ORDER — CEFDINIR 300 MG/1
300 CAPSULE ORAL EVERY 12 HOURS SCHEDULED
Qty: 13 CAPSULE | Refills: 0 | Status: SHIPPED | OUTPATIENT
Start: 2024-01-01 | End: 2024-01-01

## 2024-01-01 RX ORDER — CEFDINIR 300 MG/1
300 CAPSULE ORAL ONCE
Status: COMPLETED | OUTPATIENT
Start: 2024-01-01 | End: 2024-01-01

## 2024-01-01 RX ORDER — LEVETIRACETAM 500 MG/1
250 TABLET ORAL 2 TIMES DAILY
Status: DISCONTINUED | OUTPATIENT
Start: 2024-01-01 | End: 2024-01-01

## 2024-01-01 RX ORDER — CHLORHEXIDINE GLUCONATE ORAL RINSE 1.2 MG/ML
15 SOLUTION DENTAL EVERY 12 HOURS SCHEDULED
Status: DISCONTINUED | OUTPATIENT
Start: 2024-01-01 | End: 2024-01-01 | Stop reason: HOSPADM

## 2024-01-01 RX ORDER — DEXMEDETOMIDINE HYDROCHLORIDE 4 UG/ML
.1-.7 INJECTION, SOLUTION INTRAVENOUS
Status: DISCONTINUED | OUTPATIENT
Start: 2024-01-01 | End: 2024-01-01 | Stop reason: HOSPADM

## 2024-01-01 RX ORDER — AMOXICILLIN 250 MG
2 CAPSULE ORAL 2 TIMES DAILY
Status: DISCONTINUED | OUTPATIENT
Start: 2024-01-01 | End: 2024-01-01

## 2024-01-01 RX ORDER — LIDOCAINE HCL 20 MG/ML
1 SYRINGE (ML) INTRAVENOUS ONCE
Status: COMPLETED | OUTPATIENT
Start: 2024-01-01 | End: 2024-01-01

## 2024-01-01 RX ORDER — FLUTICASONE PROPIONATE 50 MCG
2 SPRAY, SUSPENSION (ML) NASAL DAILY
Status: DISCONTINUED | OUTPATIENT
Start: 2024-01-01 | End: 2024-01-01

## 2024-01-01 RX ORDER — MILRINONE LACTATE 0.2 MG/ML
0.13 INJECTION, SOLUTION INTRAVENOUS CONTINUOUS
Status: DISCONTINUED | OUTPATIENT
Start: 2024-01-01 | End: 2024-01-01 | Stop reason: HOSPADM

## 2024-01-01 RX ORDER — NOREPINEPHRINE BITARTRATE 1 MG/ML
INJECTION, SOLUTION INTRAVENOUS
Status: COMPLETED
Start: 2024-01-01 | End: 2024-01-01

## 2024-01-01 RX ORDER — VANCOMYCIN HYDROCHLORIDE 750 MG/150ML
10 INJECTION, SOLUTION INTRAVENOUS DAILY PRN
Status: DISCONTINUED | OUTPATIENT
Start: 2024-01-01 | End: 2024-01-01 | Stop reason: HOSPADM

## 2024-01-01 RX ORDER — MIDAZOLAM HYDROCHLORIDE 2 MG/2ML
2 INJECTION, SOLUTION INTRAMUSCULAR; INTRAVENOUS ONCE
Status: COMPLETED | OUTPATIENT
Start: 2024-01-01 | End: 2024-01-01

## 2024-01-01 RX ORDER — FLUTICASONE FUROATE AND VILANTEROL 100; 25 UG/1; UG/1
1 POWDER RESPIRATORY (INHALATION) DAILY
Status: DISCONTINUED | OUTPATIENT
Start: 2024-01-01 | End: 2024-01-01 | Stop reason: HOSPADM

## 2024-01-01 RX ADMIN — LIDOCAINE HYDROCHLORIDE 1 APPLICATION: 20 JELLY TOPICAL at 03:52

## 2024-01-01 RX ADMIN — VASOPRESSIN 0.04 UNITS/MIN: 20 INJECTION INTRAVENOUS at 20:12

## 2024-01-01 RX ADMIN — MIDAZOLAM HYDROCHLORIDE 2 MG: 2 INJECTION, SOLUTION INTRAMUSCULAR; INTRAVENOUS at 00:30

## 2024-01-01 RX ADMIN — LEVETIRACETAM 250 MG: 100 INJECTION, SOLUTION INTRAVENOUS at 21:36

## 2024-01-01 RX ADMIN — HEPARIN SODIUM 5000 UNITS: 5000 INJECTION INTRAVENOUS; SUBCUTANEOUS at 13:59

## 2024-01-01 RX ADMIN — SODIUM CHLORIDE 1000 ML: 0.9 INJECTION, SOLUTION INTRAVENOUS at 19:45

## 2024-01-01 RX ADMIN — INSULIN LISPRO 4 UNITS: 100 INJECTION, SOLUTION INTRAVENOUS; SUBCUTANEOUS at 12:40

## 2024-01-01 RX ADMIN — NOREPINEPHRINE BITARTRATE 60 MCG/MIN: 1 INJECTION, SOLUTION, CONCENTRATE INTRAVENOUS at 23:58

## 2024-01-01 RX ADMIN — MAGNESIUM SULFATE HEPTAHYDRATE 1 G: 1 INJECTION, SOLUTION INTRAVENOUS at 14:38

## 2024-01-01 RX ADMIN — CEFDINIR 300 MG: 300 CAPSULE ORAL at 13:45

## 2024-01-01 RX ADMIN — FENTANYL CITRATE 100 MCG: 50 INJECTION, SOLUTION INTRAMUSCULAR; INTRAVENOUS at 02:06

## 2024-01-01 RX ADMIN — AMPICILLIN SODIUM 2000 MG: 2 INJECTION, POWDER, FOR SOLUTION INTRAMUSCULAR; INTRAVENOUS at 18:18

## 2024-01-01 RX ADMIN — METRONIDAZOLE 500 MG: 500 INJECTION, SOLUTION INTRAVENOUS at 05:17

## 2024-01-01 RX ADMIN — FUROSEMIDE 40 MG: 10 INJECTION, SOLUTION INTRAMUSCULAR; INTRAVENOUS at 22:06

## 2024-01-01 RX ADMIN — SODIUM BICARBONATE 50 MEQ: 84 INJECTION INTRAVENOUS at 00:15

## 2024-01-01 RX ADMIN — CEFTRIAXONE SODIUM 1000 MG: 10 INJECTION, POWDER, FOR SOLUTION INTRAVENOUS at 20:25

## 2024-01-01 RX ADMIN — VASOPRESSIN 0.04 UNITS/MIN: 20 INJECTION INTRAVENOUS at 11:50

## 2024-01-01 RX ADMIN — SODIUM BICARBONATE 50 MEQ: 84 INJECTION INTRAVENOUS at 01:26

## 2024-01-01 RX ADMIN — HYDROCORTISONE SODIUM SUCCINATE 50 MG: 100 INJECTION, POWDER, FOR SOLUTION INTRAMUSCULAR; INTRAVENOUS at 01:26

## 2024-01-01 RX ADMIN — CALCIUM CHLORIDE 1 G: 100 INJECTION INTRAVENOUS; INTRAVENTRICULAR at 00:15

## 2024-01-01 RX ADMIN — INSULIN LISPRO 3 UNITS: 100 INJECTION, SOLUTION INTRAVENOUS; SUBCUTANEOUS at 17:06

## 2024-01-01 RX ADMIN — Medication 15 MCG/MIN: at 02:46

## 2024-01-01 RX ADMIN — INSULIN LISPRO 2 UNITS: 100 INJECTION, SOLUTION INTRAVENOUS; SUBCUTANEOUS at 06:20

## 2024-01-01 RX ADMIN — CHLORHEXIDINE GLUCONATE 0.12% ORAL RINSE 15 ML: 1.2 LIQUID ORAL at 21:36

## 2024-01-01 RX ADMIN — HEPARIN SODIUM 5000 UNITS: 5000 INJECTION INTRAVENOUS; SUBCUTANEOUS at 21:37

## 2024-01-01 RX ADMIN — VANCOMYCIN HYDROCHLORIDE 1750 MG: 1 INJECTION, POWDER, LYOPHILIZED, FOR SOLUTION INTRAVENOUS at 18:24

## 2024-01-01 RX ADMIN — THIAMINE HYDROCHLORIDE 200 MG: 100 INJECTION, SOLUTION INTRAMUSCULAR; INTRAVENOUS at 21:36

## 2024-01-01 RX ADMIN — Medication 9 MCG/MIN: at 10:53

## 2024-01-01 RX ADMIN — SODIUM BICARBONATE 100 ML/HR: 84 INJECTION, SOLUTION INTRAVENOUS at 03:55

## 2024-01-01 RX ADMIN — HYDROCORTISONE SODIUM SUCCINATE 50 MG: 100 INJECTION, POWDER, FOR SOLUTION INTRAMUSCULAR; INTRAVENOUS at 23:46

## 2024-01-01 RX ADMIN — SODIUM CHLORIDE 1000 ML: 0.9 INJECTION, SOLUTION INTRAVENOUS at 04:29

## 2024-01-01 RX ADMIN — CHLORHEXIDINE GLUCONATE 0.12% ORAL RINSE 15 ML: 1.2 LIQUID ORAL at 08:03

## 2024-01-01 RX ADMIN — PHENYLEPHRINE HYDROCHLORIDE 25 MCG/MIN: 10 INJECTION INTRAVENOUS at 23:23

## 2024-01-01 RX ADMIN — THIAMINE HYDROCHLORIDE 200 MG: 100 INJECTION, SOLUTION INTRAMUSCULAR; INTRAVENOUS at 09:21

## 2024-01-01 RX ADMIN — CEFTRIAXONE SODIUM 2000 MG: 10 INJECTION, POWDER, FOR SOLUTION INTRAVENOUS at 19:06

## 2024-01-01 RX ADMIN — MILRINONE LACTATE 0.25 MCG/KG/MIN: 200 INJECTION, SOLUTION INTRAVENOUS at 05:11

## 2024-01-01 RX ADMIN — PERFLUTREN 0.8 ML/MIN: 6.52 INJECTION, SUSPENSION INTRAVENOUS at 10:46

## 2024-01-01 RX ADMIN — METRONIDAZOLE 500 MG: 500 INJECTION, SOLUTION INTRAVENOUS at 12:42

## 2024-01-01 RX ADMIN — HEPARIN SODIUM 5000 UNITS: 5000 INJECTION INTRAVENOUS; SUBCUTANEOUS at 05:25

## 2024-01-01 RX ADMIN — MIDAZOLAM 2 MG: 1 INJECTION INTRAMUSCULAR; INTRAVENOUS at 00:30

## 2024-01-01 RX ADMIN — HYDROCORTISONE SODIUM SUCCINATE 50 MG: 100 INJECTION, POWDER, FOR SOLUTION INTRAMUSCULAR; INTRAVENOUS at 11:31

## 2024-01-01 RX ADMIN — SODIUM BICARBONATE 50 MEQ: 84 INJECTION INTRAVENOUS at 03:41

## 2024-01-01 RX ADMIN — SODIUM CHLORIDE, SODIUM GLUCONATE, SODIUM ACETATE, POTASSIUM CHLORIDE, MAGNESIUM CHLORIDE, SODIUM PHOSPHATE, DIBASIC, AND POTASSIUM PHOSPHATE 1000 ML: .53; .5; .37; .037; .03; .012; .00082 INJECTION, SOLUTION INTRAVENOUS at 01:00

## 2024-01-01 RX ADMIN — SODIUM CHLORIDE, SODIUM GLUCONATE, SODIUM ACETATE, POTASSIUM CHLORIDE, MAGNESIUM CHLORIDE, SODIUM PHOSPHATE, DIBASIC, AND POTASSIUM PHOSPHATE 125 ML/HR: .53; .5; .37; .037; .03; .012; .00082 INJECTION, SOLUTION INTRAVENOUS at 01:40

## 2024-01-01 RX ADMIN — CHLORHEXIDINE GLUCONATE 0.12% ORAL RINSE 15 ML: 1.2 LIQUID ORAL at 01:26

## 2024-01-01 RX ADMIN — VASOPRESSIN 0.04 UNITS/MIN: 20 INJECTION INTRAVENOUS at 03:05

## 2024-01-01 RX ADMIN — Medication 5 MCG/MIN: at 22:30

## 2024-01-01 RX ADMIN — ALBUMIN (HUMAN) 50 G: 0.25 INJECTION, SOLUTION INTRAVENOUS at 23:11

## 2024-01-01 RX ADMIN — HYDROCORTISONE SODIUM SUCCINATE 50 MG: 100 INJECTION, POWDER, FOR SOLUTION INTRAMUSCULAR; INTRAVENOUS at 17:01

## 2024-01-01 RX ADMIN — INSULIN LISPRO 1 UNITS: 100 INJECTION, SOLUTION INTRAVENOUS; SUBCUTANEOUS at 23:46

## 2024-01-01 RX ADMIN — FLUTICASONE FUROATE AND VILANTEROL TRIFENATATE 1 PUFF: 100; 25 POWDER RESPIRATORY (INHALATION) at 08:14

## 2024-01-01 RX ADMIN — CALCIUM CHLORIDE 1 G: 100 INJECTION INTRAVENOUS; INTRAVENTRICULAR at 00:00

## 2024-01-01 RX ADMIN — Medication 14 MCG/MIN: at 05:11

## 2024-01-01 RX ADMIN — LEVETIRACETAM 250 MG: 100 INJECTION, SOLUTION INTRAVENOUS at 08:00

## 2024-01-01 RX ADMIN — SODIUM CHLORIDE 1000 ML: 0.9 INJECTION, SOLUTION INTRAVENOUS at 19:15

## 2024-01-01 RX ADMIN — METRONIDAZOLE 500 MG: 500 INJECTION, SOLUTION INTRAVENOUS at 21:36

## 2024-01-01 RX ADMIN — DEXMEDETOMIDINE HYDROCHLORIDE 0.3 MCG/KG/HR: 400 INJECTION INTRAVENOUS at 22:40

## 2024-01-01 RX ADMIN — VASOPRESSIN 0.04 UNITS/MIN: 20 INJECTION INTRAVENOUS at 20:16

## 2024-01-01 RX ADMIN — NOREPINEPHRINE BITARTRATE 5 MCG: 1 SOLUTION INTRAVENOUS at 19:40

## 2024-01-01 RX ADMIN — UMECLIDINIUM 1 PUFF: 62.5 AEROSOL, POWDER ORAL at 08:14

## 2024-01-01 RX ADMIN — Medication 7 MCG/MIN: at 19:45

## 2024-01-01 RX ADMIN — SODIUM BICARBONATE 50 MEQ: 84 INJECTION INTRAVENOUS at 00:00

## 2024-01-01 RX ADMIN — HYDROCORTISONE SODIUM SUCCINATE 50 MG: 100 INJECTION, POWDER, FOR SOLUTION INTRAMUSCULAR; INTRAVENOUS at 05:25

## 2024-01-09 DIAGNOSIS — R05.3 CHRONIC COUGH: ICD-10-CM

## 2024-01-09 RX ORDER — PANTOPRAZOLE SODIUM 40 MG/1
TABLET, DELAYED RELEASE ORAL
Qty: 90 TABLET | Refills: 3 | Status: SHIPPED | OUTPATIENT
Start: 2024-01-09

## 2024-01-17 DIAGNOSIS — E78.2 MIXED HYPERLIPIDEMIA: ICD-10-CM

## 2024-01-17 RX ORDER — SIMVASTATIN 40 MG
TABLET ORAL
Qty: 90 TABLET | Refills: 3 | Status: SHIPPED | OUTPATIENT
Start: 2024-01-17

## 2024-02-08 ENCOUNTER — OFFICE VISIT (OUTPATIENT)
Age: 89
End: 2024-02-08
Payer: MEDICARE

## 2024-02-08 VITALS — HEART RATE: 64 BPM | DIASTOLIC BLOOD PRESSURE: 60 MMHG | SYSTOLIC BLOOD PRESSURE: 110 MMHG

## 2024-02-08 DIAGNOSIS — M79.672 PAIN OF LEFT HEEL: Primary | ICD-10-CM

## 2024-02-08 PROBLEM — I50.23 ACUTE ON CHRONIC SYSTOLIC (CONGESTIVE) HEART FAILURE (HCC): Status: ACTIVE | Noted: 2024-02-08

## 2024-02-08 PROCEDURE — 99213 OFFICE O/P EST LOW 20 MIN: CPT | Performed by: INTERNAL MEDICINE

## 2024-02-08 NOTE — PROGRESS NOTES
Name: Arjun Huff      : 1927      MRN: [id number]  Encounter Provider: Ismael Riggs DO  Encounter Date: 2024   Encounter department: [facility] PRIMARY CARE [place]    Assessment & Plan     Assessment & Plan  1. Heel pain, left  I do not feel any deformities on physical exam. It might be neuropathy or due to effects of arthritis. Also discussed plantar fasciitis but the symptoms are not persistent ultimately. Discussed staying away from gabapentin or cymbalta due to possible side effects and current heel pain not that bothersome to him.    I will order an x-ray of his heel. He can use Salonpas, Icy Hot, topical Voltaren gel, and good cushioning in his shoe. He can use Tylenol. If it is becoming more persistent or starting to affect his activities of daily living, we will refer him to a foot doctor.         Subjective      History of Present Illness  The patient is a 89-year-old male who presents for evaluation of heel pain.     Heel pain he states has been going on intermittently. 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 He has been using Icy Hot spray and a paste every other day. The pain is not constant. It bothers him only when he is in bed. He describes the pain as burning. It also bothers his big toe. He denies any worsening pain in his back.    Review of Systems   Musculoskeletal:  Positive for arthralgias (L heel) and gait problem (uses rolling walker).   Neurological:  Positive for numbness.     Objective     /60   Pulse 64     Physical Exam  Cardiovascular:      Rate and Rhythm: Normal rate and regular rhythm.      Heart sounds: No murmur heard.  Pulmonary:      Effort: Pulmonary effort is normal. No respiratory distress.      Breath sounds: No wheezing.   Musculoskeletal:         General: No tenderness or deformity.      Comments: Negative estela's sign   Neurological:      Gait: Gait abnormal.       Ismael Riggs DO

## 2024-03-28 DIAGNOSIS — I10 ESSENTIAL HYPERTENSION: ICD-10-CM

## 2024-03-28 RX ORDER — METOPROLOL SUCCINATE 25 MG/1
TABLET, EXTENDED RELEASE ORAL
Qty: 90 TABLET | Refills: 3 | Status: SHIPPED | OUTPATIENT
Start: 2024-03-28

## 2024-04-09 ENCOUNTER — CONSULT (OUTPATIENT)
Age: 89
End: 2024-04-09
Payer: MEDICARE

## 2024-04-09 VITALS
SYSTOLIC BLOOD PRESSURE: 108 MMHG | HEIGHT: 63 IN | WEIGHT: 173.8 LBS | TEMPERATURE: 96.4 F | OXYGEN SATURATION: 97 % | HEART RATE: 66 BPM | BODY MASS INDEX: 30.79 KG/M2 | RESPIRATION RATE: 17 BRPM | DIASTOLIC BLOOD PRESSURE: 66 MMHG

## 2024-04-09 DIAGNOSIS — Z01.818 PRE-OPERATIVE EXAMINATION: ICD-10-CM

## 2024-04-09 DIAGNOSIS — I13.0 HYPERTENSIVE HEART AND KIDNEY DISEASE WITH CHRONIC SYSTOLIC CONGESTIVE HEART FAILURE AND STAGE 3B CHRONIC KIDNEY DISEASE (HCC): ICD-10-CM

## 2024-04-09 DIAGNOSIS — N18.32 HYPERTENSIVE HEART AND KIDNEY DISEASE WITH CHRONIC SYSTOLIC CONGESTIVE HEART FAILURE AND STAGE 3B CHRONIC KIDNEY DISEASE (HCC): ICD-10-CM

## 2024-04-09 DIAGNOSIS — I50.22 HYPERTENSIVE HEART AND KIDNEY DISEASE WITH CHRONIC SYSTOLIC CONGESTIVE HEART FAILURE AND STAGE 3B CHRONIC KIDNEY DISEASE (HCC): ICD-10-CM

## 2024-04-09 DIAGNOSIS — H26.8 OTHER CATARACT OF RIGHT EYE: Primary | ICD-10-CM

## 2024-04-09 PROCEDURE — 93000 ELECTROCARDIOGRAM COMPLETE: CPT | Performed by: INTERNAL MEDICINE

## 2024-04-09 PROCEDURE — 99214 OFFICE O/P EST MOD 30 MIN: CPT | Performed by: INTERNAL MEDICINE

## 2024-04-09 NOTE — PROGRESS NOTES
Name: Arjun Huff      : 1927      MRN: [id number]  Encounter Provider: Ismael Riggs DO  Encounter Date: 2024   Encounter department: [facility] PRIMARY CARE [place]    Assessment & Plan     1. Other cataract of right eye  2. Pre-operative examination    He is cleared for possible right eye cataract extraction. He will be meeting with the surgeon soon to make a final decision. EKG done in the office today showed stable LBBB. This was seen on last EKG in .     - POCT ECG    3. 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 He has labs to get done before next appointment in . No evidence of decompensated CHF.        Dudley Díaz presents for pre-op evaluation from Dr. Prajapati. He is potentially slated to get right eye cataract surgery performed. He denies any cardiac symptoms.    Review of Systems   Constitutional:  Positive for fatigue. Negative for activity change and appetite change.   Eyes:  Positive for visual disturbance.   Respiratory:  Negative for apnea, cough, chest tightness, shortness of breath and wheezing.    Cardiovascular:  Negative for chest pain, palpitations and leg swelling.   Gastrointestinal:  Negative for abdominal distention, abdominal pain, blood in stool, constipation, diarrhea, nausea and vomiting.   Musculoskeletal:  Positive for arthralgias and gait problem.   Skin:  Negative for wound.   Neurological:  Negative for dizziness, weakness, light-headedness, numbness and headaches.   Psychiatric/Behavioral:  Negative for behavioral problems, confusion, hallucinations, sleep disturbance and suicidal ideas. The patient is not nervous/anxious.      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     Past Medical History:   Diagnosis Date    Arthritis     CHF (congestive heart failure) (HCC)     Coronary artery disease     Esophageal reflux     History of transfusion     \"MANY YEARS AGO\"    Hyperlipidemia     Hypertension     Renal disorder     PT DENIES, BUT ON PROBLEM LIST    Stroke (HCC)     NOT SURE     Past Surgical History:   Procedure Laterality Date    ABDOMINAL SURGERY      BACK SURGERY      CARDIAC SURGERY      COLON SURGERY      CORONARY ARTERY BYPASS GRAFT      FRACTURE SURGERY      TONSILLECTOMY      Last Assessed: 2016     Social History     Occupational History    Not on file   Tobacco Use    Smoking status: Former     Current packs/day: 0.00     Types: Cigars, Cigarettes     Quit date: 1950     Years since quittin.9    Smokeless tobacco: Never    Tobacco comments:     occasional-cigar   Vaping Use    Vaping status: Never Used   Substance and Sexual Activity    Alcohol use: Yes     Alcohol/week: 2.0 standard drinks of alcohol     Types: 1 Glasses of wine, 1 Cans of beer per week     Comment: daily    Drug use: No    Sexual activity: Not Currently     Partners: Female     Allergies   Allergen Reactions    Penicillamine Rash    Penicillins Rash     Rash on hands    Morphine Other (See Comments)     Low blood pressure     Objective     /66 (BP Location: Left arm, Patient Position: Sitting, Cuff Size: Standard)   Pulse 66   Temp (!) 96.4 °F (35.8 °C) (Tympanic)   Resp 17   Ht 5' 3\" (1.6 m)   Wt 78.8 kg (173 lb 12.8 oz) Comment: with shoes on  SpO2 97%   BMI 30.79 kg/m²     Physical Exam  Constitutional:  "      General: He is not in acute distress.     Appearance: He is not ill-appearing.   Cardiovascular:      Rate and Rhythm: Normal rate and regular rhythm.      Heart sounds: No murmur heard.  Pulmonary:      Effort: Pulmonary effort is normal. No respiratory distress.      Breath sounds: No wheezing.   Abdominal:      General: Bowel sounds are normal. There is no distension.      Tenderness: There is no abdominal tenderness.   Neurological:      Mental Status: He is alert.       Ismael [facility], DO

## 2024-04-10 ENCOUNTER — TELEPHONE (OUTPATIENT)
Age: 89
End: 2024-04-10

## 2024-04-10 NOTE — TELEPHONE ENCOUNTER
"Pre-op paperwork ( office notes, EKG & Forms) faxed to [phone]. Confirmation \"OK\" received.  "

## 2024-04-11 ENCOUNTER — TELEPHONE (OUTPATIENT)
Dept: CARDIOLOGY CLINIC | Facility: CLINIC | Age: 89
End: 2024-04-11

## 2024-04-11 NOTE — TELEPHONE ENCOUNTER
.Caller: Ioana     Doctor/Office: Dr. Kelli Ramsey [facility]#: [phone]    What needs to be faxed:      office is faxing a Cardiac Clearance form that needs to be signed so pt can have surgery on Monday 4/15/24     ATTN to:     Fax#:       Documents were successfully e-faxed

## 2024-04-11 NOTE — TELEPHONE ENCOUNTER
Received Cardiac clearance form through fax from [facility].    Patient is scheduled for surgery on 4/15/24.     Pt last Ov was 9/27/23 with       Scanned into chart.    please advise

## 2024-04-12 NOTE — TELEPHONE ENCOUNTER
Pt's daughter called to inquire about Cardiac clearance for pt's eye surgery on Monday 4/15/24.    Pt needs to start eye drops in order to prepare for his surgery asap.     Forms were scanned into pt's chart.    Please advise on clearance.    Thanks!

## 2024-04-12 NOTE — TELEPHONE ENCOUNTER
Spoke with  through TT.     has not seen pt since September 2023 no documentation can be done until pt is seen in office.     As per pt daughter, pt eye surgery is rescheduled for Wednesday 4/17/24.    Please schedule pt for a cardiac clearance appt to get his forms fill out

## 2024-04-12 NOTE — TELEPHONE ENCOUNTER
Spoke with pt's daughter and Dr. Shipman's message was relayed. She stated that 's office needs the forms filled out, sigh and faxed back to their office. Left message on Ioana's vm of cardiac clearance.

## 2024-04-16 PROBLEM — E87.29 HIGH ANION GAP METABOLIC ACIDOSIS: Status: ACTIVE | Noted: 2024-01-01

## 2024-04-16 PROBLEM — N18.9 ACUTE KIDNEY INJURY SUPERIMPOSED ON CHRONIC KIDNEY DISEASE  (HCC): Status: ACTIVE | Noted: 2024-01-01

## 2024-04-16 PROBLEM — A41.9 SEPTIC SHOCK (HCC): Status: ACTIVE | Noted: 2024-01-01

## 2024-04-16 PROBLEM — E87.20 LACTIC ACIDOSIS: Status: ACTIVE | Noted: 2024-01-01

## 2024-04-16 PROBLEM — R65.21 SEPTIC SHOCK (HCC): Status: ACTIVE | Noted: 2024-01-01

## 2024-04-16 PROBLEM — N17.9 ACUTE KIDNEY INJURY SUPERIMPOSED ON CHRONIC KIDNEY DISEASE  (HCC): Status: ACTIVE | Noted: 2024-01-01

## 2024-04-16 NOTE — DISCHARGE INSTRUCTIONS
Take antibiotics as prescribed.    Please follow-up with your family doctor and urologist. Return to the ER with any worsening symptoms, fevers, or flank pain.

## 2024-04-16 NOTE — ED PROVIDER NOTES
History  Chief Complaint   Patient presents with    Possible UTI     Pt presents with c/o body aches, and frequent urination since last night. Denies fevers that he knows of, burning with urination. Did just give a urine specimen out patient but decided before leaving to come in for eval d/t generally feeling unwell.      89yo male with a history of coronary artery disease, CHF, hypertension, hyperlipidemia, emphysema, and CKD presenting with his daughter and caregiver for evaluation of UTI symptoms. Symptoms began yesterday. He reports urinating every hour throughout the night. He also reports body aches. No fevers or chills. No abdominal pain, flank pain, vomiting, testicular pain. He called his urologist's office due to his symptoms. He was sent for an outpatient urinalysis. He dropped off his urine sample at the lab this morning but decided to come to the ED due to feeling unwell.       History provided by:  Patient, caregiver and relative   used: No        Prior to Admission Medications   Prescriptions Last Dose Informant Patient Reported? Taking?   Polyethylene Glycol 3350 (MIRALAX PO)  Child Yes No   Sig: Take by mouth as needed     amLODIPine (NORVASC) 5 mg tablet  Child No No   Sig: Take 1 tablet (5 mg total) by mouth daily   aspirin 81 mg chewable tablet  Child No No   Sig: Chew 1 tablet (81 mg total) daily   Patient taking differently: Chew 324 mg daily   cyanocobalamin (VITAMIN B-12) 500 MCG tablet  Child No No   Sig: Take 1 tablet (500 mcg total) by mouth daily   fexofenadine (ALLEGRA) 60 MG tablet  Child Yes No   Sig: Take 60 mg by mouth daily   fluticasone (FLONASE) 50 mcg/act nasal spray  Child Yes No   Si sprays into each nostril daily   fluticasone-umeclidinium-vilanterol (Trelegy Ellipta) 100-62.5-25 MCG/INH inhaler  Child Yes No   Sig: Inhale 1 puff daily Rinse mouth after use.   furosemide (LASIX) 20 mg tablet  Child No No   Sig: Take 1 tablet (20 mg total) by mouth daily  "  levETIRAcetam (KEPPRA) 250 mg tablet  Child No No   Sig: Take 1 tablet (250 mg total) by mouth 2 (two) times a day   metoprolol succinate (TOPROL-XL) 25 mg 24 hr tablet  Child No No   Sig: TAKE 1 TABLET DAILY   pantoprazole (PROTONIX) 40 mg tablet  Child No No   Sig: TAKE 1 TABLET DAILY   simvastatin (ZOCOR) 40 mg tablet  Child No No   Sig: TAKE 1 TABLET DAILY   tamsulosin (FLOMAX) 0.4 mg  Child Yes No   Sig: Take 0.4 mg by mouth daily      Facility-Administered Medications: None       Past Medical History:   Diagnosis Date    Arthritis     CHF (congestive heart failure) (HCC)     Coronary artery disease     Esophageal reflux     History of transfusion     \"MANY YEARS AGO\"    Hyperlipidemia     Hypertension     Renal disorder     PT DENIES, BUT ON PROBLEM LIST    Stroke (HCC)     NOT SURE       Past Surgical History:   Procedure Laterality Date    ABDOMINAL SURGERY      BACK SURGERY      CARDIAC SURGERY      COLON SURGERY      CORONARY ARTERY BYPASS GRAFT      FRACTURE SURGERY      TONSILLECTOMY      Last Assessed: 2016       Family History   Problem Relation Age of Onset    Cancer Mother     Stroke Father      I have reviewed and agree with the history as documented.    E-Cigarette/Vaping    E-Cigarette Use Never User      E-Cigarette/Vaping Substances    Nicotine No     THC No     CBD No     Flavoring No     Other No     Unknown No      Social History     Tobacco Use    Smoking status: Former     Current packs/day: 0.00     Types: Cigars, Cigarettes     Quit date: 1950     Years since quittin.9    Smokeless tobacco: Never    Tobacco comments:     occasional-cigar   Vaping Use    Vaping status: Never Used   Substance Use Topics    Alcohol use: Yes     Alcohol/week: 2.0 standard drinks of alcohol     Types: 1 Glasses of wine, 1 Cans of beer per week     Comment: daily    Drug use: No       Review of Systems   Constitutional:  Negative for chills and fever.   HENT:  Negative for drooling and voice " change.    Eyes:  Negative for discharge and redness.   Respiratory:  Negative for shortness of breath and stridor.    Cardiovascular:  Negative for chest pain and leg swelling.   Gastrointestinal:  Negative for abdominal pain and vomiting.   Genitourinary:  Positive for frequency. Negative for dysuria.   Musculoskeletal:  Positive for myalgias. Negative for neck pain and neck stiffness.   Skin:  Negative for color change and rash.   Neurological:  Negative for seizures and syncope.   Psychiatric/Behavioral:  Negative for confusion. The patient is not nervous/anxious.    All other systems reviewed and are negative.      Physical Exam  Physical Exam  Vitals and nursing note reviewed.   Constitutional:       General: He is not in acute distress.     Appearance: He is well-developed. He is not diaphoretic.   HENT:      Head: Normocephalic and atraumatic.      Right Ear: External ear normal.      Left Ear: External ear normal.   Eyes:      General: No scleral icterus.        Right eye: No discharge.         Left eye: No discharge.      Conjunctiva/sclera: Conjunctivae normal.   Cardiovascular:      Rate and Rhythm: Normal rate and regular rhythm.      Heart sounds: Normal heart sounds. No murmur heard.  Pulmonary:      Effort: Pulmonary effort is normal. No respiratory distress.      Breath sounds: Normal breath sounds. No stridor. No wheezing or rales.   Abdominal:      General: Bowel sounds are normal. There is no distension.      Palpations: Abdomen is soft.      Tenderness: There is no abdominal tenderness. There is no right CVA tenderness, left CVA tenderness or guarding.   Musculoskeletal:         General: No deformity. Normal range of motion.      Cervical back: Normal range of motion and neck supple.   Skin:     General: Skin is warm and dry.   Neurological:      Mental Status: He is alert. He is not disoriented.      GCS: GCS eye subscore is 4. GCS verbal subscore is 5. GCS motor subscore is 6.   Psychiatric:          Behavior: Behavior normal.         Vital Signs  ED Triage Vitals [04/16/24 1046]   Temperature Pulse Respirations Blood Pressure SpO2   98.4 °F (36.9 °C) 88 20 118/58 95 %      Temp Source Heart Rate Source Patient Position - Orthostatic VS BP Location FiO2 (%)   Temporal Monitor Sitting Left arm --      Pain Score       --           Vitals:    04/16/24 1046 04/16/24 1330   BP: 118/58 99/66   Pulse: 88 96   Patient Position - Orthostatic VS: Sitting Lying         Visual Acuity      ED Medications  Medications   cefdinir (OMNICEF) capsule 300 mg (300 mg Oral Given 4/16/24 1345)       Diagnostic Studies  Results Reviewed       Procedure Component Value Units Date/Time    FLU/RSV/COVID - if FLU/RSV clinically relevant [[id number]]  (Normal) Collected: 04/16/24 1115    Lab Status: Final result Specimen: Nares from Nose Updated: 04/16/24 1158     SARS-CoV-2 Negative     INFLUENZA A PCR Negative     INFLUENZA B PCR Negative     RSV PCR Negative    Narrative:      FOR PEDIATRIC PATIENTS - copy/paste COVID Guidelines URL to browser: [email or web address]    SARS-CoV-2 assay is a Nucleic Acid Amplification assay intended for the  qualitative detection of nucleic acid from SARS-CoV-2 in nasopharyngeal  swabs. Results are for the presumptive identification of SARS-CoV-2 RNA.    Positive results are indicative of infection with SARS-CoV-2, the virus  causing COVID-19, but do not rule out bacterial infection or co-infection  with other viruses. Laboratories within the [place] and its  territories are required to report all positive results to the appropriate  public health authorities. Negative results do not preclude SARS-CoV-2  infection and should not be used as the sole basis for treatment or other  patient management decisions. Negative results must be combined with  clinical observations, patient history, and epidemiological information.  This test has not  been FDA cleared or approved.    This test has been authorized by FDA under an Emergency Use Authorization  (EUA). This test is only authorized for the duration of time the  declaration that circumstances exist justifying the authorization of the  emergency use of an in vitro diagnostic tests for detection of SARS-CoV-2  virus and/or diagnosis of COVID-19 infection under section 564(b)(1) of  the Act, 21 U.S.C. 360bbb-3(b)(1), unless the authorization is terminated  or revoked sooner. The test has been validated but independent review by FDA  and CLIA is pending.    Test performed using [facility] GeneXpert: This RT-PCR assay targets N2,  a region unique to SARS-CoV-2. A conserved region in the E-gene was chosen  for pan-Sarbecovirus detection which includes SARS-CoV-2.    According to CMS-2020-01-R, this platform meets the definition of high-throughput technology.    Urine Microscopic [[id number]]  (Abnormal) Collected: 04/16/24 1139    Lab Status: Final result Specimen: Urine, Clean Catch Updated: 04/16/24 1157     RBC, UA Innumerable /hpf      WBC, UA Innumerable /hpf      Epithelial Cells Occasional /hpf      Bacteria, UA Occasional /hpf      WBC Clumps Present     Budding Yeast Present    Urine culture [[id number]] Collected: 04/16/24 1139    Lab Status: In process Specimen: Urine, Clean Catch Updated: 04/16/24 1157    UA w Reflex to Microscopic w Reflex to Culture [[id number]]  (Abnormal) Collected: 04/16/24 1139    Lab Status: Final result Specimen: Urine, Clean Catch Updated: 04/16/24 1150     Color, UA Yellow     Clarity, UA Extra Turbid     Specific Gravity, UA 1.012     pH, UA 6.0     Leukocytes, UA Large     Nitrite, UA Positive     Protein, UA 70 (1+) mg/dl      Glucose, UA Negative mg/dl      Ketones, UA Negative mg/dl      Urobilinogen, UA <2.0 mg/dl      Bilirubin, UA Negative     Occult Blood, UA Moderate    Comprehensive metabolic panel [[id number]]  (Abnormal) Collected: 04/16/24 1122    Lab Status:  Final result Specimen: Blood from Arm, Left Updated: 04/16/24 1147     Sodium 133 mmol/L      Potassium 4.5 mmol/L      Chloride 100 mmol/L      CO2 22 mmol/L      ANION GAP 11 mmol/L      BUN 22 mg/dL      Creatinine 1.45 mg/dL      Glucose 125 mg/dL      Calcium 9.1 mg/dL      AST 15 U/L      ALT 8 U/L      Alkaline Phosphatase 57 U/L      Total Protein 6.9 g/dL      Albumin 4.1 g/dL      Total Bilirubin 0.94 mg/dL      eGFR 40 ml/min/1.73sq m     Narrative:      National Kidney Disease Foundation guidelines for Chronic Kidney Disease (CKD):     Stage 1 with normal or high GFR (GFR > 90 mL/min/1.73 square meters)    Stage 2 Mild CKD (GFR = 60-89 mL/min/1.73 square meters)    Stage 3A Moderate CKD (GFR = 45-59 mL/min/1.73 square meters)    Stage 3B Moderate CKD (GFR = 30-44 mL/min/1.73 square meters)    Stage 4 Severe CKD (GFR = 15-29 mL/min/1.73 square meters)    Stage 5 End Stage CKD (GFR <15 mL/min/1.73 square meters)  Note: GFR calculation is accurate only with a steady state creatinine    CBC and differential [[id number]]  (Abnormal) Collected: 04/16/24 1122    Lab Status: Final result Specimen: Blood from Arm, Left Updated: 04/16/24 1130     WBC 13.76 Thousand/uL      RBC 3.48 Million/uL      Hemoglobin 12.0 g/dL      Hematocrit 35.0 %       fL      MCH 34.5 pg      MCHC 34.3 g/dL      RDW 13.5 %      MPV 9.4 fL      Platelets 197 Thousands/uL      nRBC 0 /100 WBCs      Segmented % 86 %      Immature Grans % 1 %      Lymphocytes % 6 %      Monocytes % 6 %      Eosinophils Relative 1 %      Basophils Relative 0 %      Absolute Neutrophils 11.87 Thousands/µL      Absolute Immature Grans 0.07 Thousand/uL      Absolute Lymphocytes 0.75 Thousands/µL      Absolute Monocytes 0.87 Thousand/µL      Eosinophils Absolute 0.17 Thousand/µL      Basophils Absolute 0.03 Thousands/µL                    CT abdomen pelvis wo contrast   Final Result by Bautista Castro MD (04/16 1331)      Urinary bladder wall  thickening which is suggestive of sequela of chronic bladder obstruction, however superimposed cystitis not excluded.      Prostatomegaly.      Colonic diverticulosis.      Cholelithiasis.      Workstation performed: [id number]                    Procedures  Procedures         ED Course  ED Course as of 04/16/24 1735   Tue Apr 16, 2024   1141 Bladder scan 112cc.   1151 Creatinine(!): 1.45  Baseline.                  SBIRT 22yo+      Flowsheet Row Most Recent Value   Initial Alcohol Screen: US AUDIT-C     1. How often do you have a drink containing alcohol? 0 Filed at: 04/16/2024 1356   2. How many drinks containing alcohol do you have on a typical day you are drinking?  0 Filed at: 04/16/2024 1351   3b. FEMALE Any Age, or MALE 65+: How often do you have 4 or more drinks on one occassion? 0 Filed at: 04/16/2024 1353   Audit-C Score 0 Filed at: 04/16/2024 1350   ZUNILDA: How many times in the past year have you...    Used an illegal drug or used a prescription medication for non-medical reasons? Never Filed at: 04/16/2024 0366                      Medical Decision Making  89yoM here with UTI symptoms that began yesterday. C/o urinary frequency and body aches. No fevers, vomiting, flank pain, abdominal pain. He is well appearing with stable vital signs. Temperature 98.4. No abdominal or CVA tenderness on exam.    Initial ED plan: Check CBC, CMP, UA, and CT abdomen.    Final assessment: UA is nitrite positive with innumerable WBC. Leukocytosis noted with a WBC of 13.6. Renal function at baseline. CT abdomen shows bladder wall thickening. No indication for admission. He was started on a course of cefdinir. Advised close PCP and urology follow-up. ED return precautions discussed. Patient expressed understanding and is agreeable to plan. Patient discharged in stable condition.         Problems Addressed:  Urinary tract infection: acute illness or injury    Amount and/or Complexity of Data Reviewed  Labs: ordered.  Decision-making details documented in ED Course.  Radiology: ordered.    Risk  Prescription drug management.             Disposition  Final diagnoses:   Urinary tract infection     Time reflects when diagnosis was documented in both MDM as applicable and the Disposition within this note       Time User Action Codes Description Comment    4/16/2024  1:40 PM Alberta Wong Add [N39.0] Urinary tract infection           ED Disposition       ED Disposition   Discharge    Condition   Stable    Date/Time   Tue Apr 16, 2024 1340    Comment   Arjun Huff discharge to home/self care.                   Follow-up Information       Follow up With Specialties Details Why Contact Info Additional Information    Ismael Riggs DO Internal Medicine Schedule an appointment as soon as possible for a visit   [address]  [address] Floor  [facility] [address]  [phone]       [facility] Emergency Medicine  If symptoms worsen [address] [facility] [id number] [phone] [facility], [address] [place], [place], [address]            Discharge Medication List as of 4/16/2024  1:41 PM        START taking these medications    Details   cefdinir (OMNICEF) 300 mg capsule Take 1 capsule (300 mg total) by mouth every 12 (twelve) hours for 7 days, Starting Tue 4/16/2024, Until Tue 4/23/2024, Normal           CONTINUE these medications which have NOT CHANGED    Details   amLODIPine (NORVASC) 5 mg tablet Take 1 tablet (5 mg total) by mouth daily, Starting Tue 11/28/2023, Normal      aspirin 81 mg chewable tablet Chew 1 tablet (81 mg total) daily, Starting Wed 9/27/2023, Normal      cyanocobalamin (VITAMIN B-12) 500 MCG tablet Take 1 tablet (500 mcg total) by mouth daily, Starting Thu 6/24/2021, Normal      fexofenadine (ALLEGRA) 60 MG tablet Take 60 mg by mouth daily, Historical Med      fluticasone (FLONASE) 50 mcg/act  nasal spray 2 sprays into each nostril daily, Historical Med      fluticasone-umeclidinium-vilanterol (Trelegy Ellipta) 100-62.5-25 MCG/INH inhaler Inhale 1 puff daily Rinse mouth after use., Historical Med      furosemide (LASIX) 20 mg tablet Take 1 tablet (20 mg total) by mouth daily, Starting Tue 11/28/2023, Normal      levETIRAcetam (KEPPRA) 250 mg tablet Take 1 tablet (250 mg total) by mouth 2 (two) times a day, Starting Tue 11/28/2023, Normal      metoprolol succinate (TOPROL-XL) 25 mg 24 hr tablet TAKE 1 TABLET DAILY, Normal      pantoprazole (PROTONIX) 40 mg tablet TAKE 1 TABLET DAILY, Normal      Polyethylene Glycol 3350 (MIRALAX PO) Take by mouth as needed  , Historical Med      simvastatin (ZOCOR) 40 mg tablet TAKE 1 TABLET DAILY, Normal      tamsulosin (FLOMAX) 0.4 mg Take 0.4 mg by mouth daily, Historical Med             No discharge procedures on file.    PDMP Review         Value Time User    PDMP Reviewed  Yes 1/4/2022  4:47 AM Sofia Richardson PA-C            ED Provider  Electronically Signed by             Alberta Wong PA-C  04/16/24 2492

## 2024-04-17 NOTE — PROGRESS NOTES
Arjun Huff is a 89 y.o. male who is currently ordered Vancomycin IV with management by the Pharmacy Consult service.  Relevant clinical data and objective / subjective history reviewed.  Vancomycin Assessment:  Indication and Goal AUC/Trough: Bacteremia (goal -600, trough >10); Urinary tract infection (goal -600, trough >10)  Clinical Status: New Start, worsening  Micro:     Renal Function:  SCr: 2.46 mg/dL  CrCl: 16.2 mL/min  Renal replacement: Not on dialysis  Days of Therapy: 1  Current Dose: 1750mg LDx1, then 750mg IV daily prn when vancomycin level <15  Vancomycin Plan:  New Dosinmg LDx1, then 750mg IV daily prn when vancomycin level <15  Next Level: 24 with AM labs  Renal Function Monitoring: Daily BMP and UOP  Pharmacy will continue to follow closely for s/sx of nephrotoxicity, infusion reactions and appropriateness of therapy.  BMP and CBC will be ordered per protocol. We will continue to follow the patient’s culture results and clinical progress daily.    Sue Ch, Pharmacist

## 2024-04-17 NOTE — PLAN OF CARE
Problem: PAIN - ADULT  Goal: Verbalizes/displays adequate comfort level or baseline comfort level  Description: Interventions:  - Encourage patient to monitor pain and request assistance  - Assess pain using appropriate pain scale  - Administer analgesics based on type and severity of pain and evaluate response  - Implement non-pharmacological measures as appropriate and evaluate response  - Consider cultural and social influences on pain and pain management  - Notify physician/advanced practitioner if interventions unsuccessful or patient reports new pain  Outcome: Progressing     Problem: CARDIOVASCULAR - ADULT  Goal: Maintains optimal cardiac output and hemodynamic stability  Description: INTERVENTIONS:  - Monitor I/O, vital signs and rhythm  - Monitor for S/S and trends of decreased cardiac output  - Administer and titrate ordered vasoactive medications to optimize hemodynamic stability  - Assess quality of pulses, skin color and temperature  - Assess for signs of decreased coronary artery perfusion  - Instruct patient to report change in severity of symptoms  Outcome: Progressing  Goal: Absence of cardiac dysrhythmias or at baseline rhythm  Description: INTERVENTIONS:  - Continuous cardiac monitoring, vital signs, obtain 12 lead EKG if ordered  - Administer antiarrhythmic and heart rate control medications as ordered  - Monitor electrolytes and administer replacement therapy as ordered  Outcome: Progressing

## 2024-04-17 NOTE — ASSESSMENT & PLAN NOTE
Lab Results   Component Value Date    EGFR 29 04/16/2024    EGFR 64 04/16/2024    EGFR 40 04/16/2024    CREATININE 1.89 (H) 04/16/2024    CREATININE 0.99 04/16/2024    CREATININE 1.45 (H) 04/16/2024     Baseline creatinine 1.4-1.6  Close intake and output  Daily weights  Trend serum creatinine  Attempt to place esposito catheter

## 2024-04-17 NOTE — ASSESSMENT & PLAN NOTE
Lab Results   Component Value Date    EGFR 21 04/17/2024    EGFR 24 04/17/2024    EGFR 23 04/17/2024    CREATININE 2.46 (H) 04/17/2024    CREATININE 2.20 (H) 04/17/2024    CREATININE 2.23 (H) 04/17/2024     Baseline creatinine 1.4-1.6  Close intake and output  Daily weights  Trend serum creatinine  If poor UOP, consider diuretic

## 2024-04-17 NOTE — H&P
[facility]  H&P  Name: Arjun Huff 89 y.o. male I MRN: [id number]  Unit/Bed#: ICU 12 I Date of Admission: 4/16/2024   Date of Service: 4/17/2024 I Hospital Day: 0      Assessment/Plan   High anion gap metabolic acidosis  Assessment & Plan  Continue volume resuscitation  Trend serum chemistries closely    Lactic acidosis  Assessment & Plan  Continue to volume resuscitate  Follow q2    Acute kidney injury superimposed on chronic kidney disease  (HCC)  Assessment & Plan  Lab Results   Component Value Date    EGFR 29 04/16/2024    EGFR 64 04/16/2024    EGFR 40 04/16/2024    CREATININE 1.89 (H) 04/16/2024    CREATININE 0.99 04/16/2024    CREATININE 1.45 (H) 04/16/2024     Baseline creatinine 1.4-1.6  Close intake and output  Daily weights  Trend serum creatinine  Attempt to place esposito catheter    BPH with obstruction/lower urinary tract symptoms  Assessment & Plan  Continue home Flomax  Attempt to place esposito catheter    Pulmonary emphysema, unspecified emphysema type (HCC)  Assessment & Plan  Continue home inhalers  Respiratory protocol    Chronic systolic heart failure (HCC)  Assessment & Plan  Wt Readings from Last 3 Encounters:   04/09/24 78.8 kg (173 lb 12.8 oz)   12/19/23 80.3 kg (177 lb)   09/27/23 78 kg (172 lb)     Hold home metoprolol and Lasix  Daily weights  Consider ECHO if pressor requirements persistent      Seizure (HCC)  Assessment & Plan  Continue home Keppra    * Septic shock (HCC)  Assessment & Plan  Suspect secondary to urinary source  Follow results of CT A/P  Day 1 ceftriaxone  Follow temperature, white count, culture results  Continue vasopressors for MAP>65  Will order stress dose steroids           History of Present Illness     HPI: Arjun Huff is a 89 y.o. who presents on 4/17 with a complaint of worsening malaise. He has a past medical history of benign prostate hyperplasia, coronary artery disease, heart failure, hypertension, and chronic kidney disease  "stage 3. He presented on 4/16 due to worsening urinary symptoms and was started on oral cefdinir. He re-presented due to worsening malaise. He was hypotensive despite volume resuscitation and required initiation of pressors. His work-up was notable for an acute kidney injury and lactic acidosis. He was referred to the critical care unit for admission.     History obtained from chart review and the patient.  Review of Systems   Constitutional:  Positive for appetite change (decreased), chills and fatigue.   HENT:  Positive for hearing loss.    Respiratory:  Positive for shortness of breath.    Cardiovascular:  Negative for chest pain.   Gastrointestinal:  Negative for abdominal pain, nausea and vomiting.   Genitourinary:  Positive for difficulty urinating and dysuria.   Musculoskeletal:  Positive for arthralgias and back pain.   Neurological:  Positive for weakness.     Disposition: Critical care  Historical Information   Past Medical History:  No date: Arthritis  No date: CHF (congestive heart failure) ([facility])  No date: Coronary artery disease  No date: Esophageal reflux  No date: History of transfusion      Comment:  \"MANY YEARS AGO\"  No date: Hyperlipidemia  No date: Hypertension  No date: Renal disorder      Comment:  PT DENIES, BUT ON PROBLEM LIST  No date: Stroke ([facility])      Comment:  NOT SURE Past Surgical History:  No date: ABDOMINAL SURGERY  No date: BACK SURGERY  No date: CARDIAC SURGERY  No date: COLON SURGERY  No date: CORONARY ARTERY BYPASS GRAFT  No date: FRACTURE SURGERY  No date: TONSILLECTOMY      Comment:  Last Assessed: 12/5/2016   Current Outpatient Medications   Medication Instructions    amLODIPine (NORVASC) 5 mg, Oral, Daily    aspirin 81 mg, Oral, Daily    cefdinir (OMNICEF) 300 mg, Oral, Every 12 hours scheduled    cyanocobalamin (VITAMIN B-12) 500 mcg, Oral, Daily    fexofenadine (ALLEGRA) 60 mg, Oral, Daily    fluticasone (FLONASE) 50 mcg/act nasal spray 2 sprays, Nasal, Daily    " fluticasone-umeclidinium-vilanterol (Trelegy Ellipta) 100-62.5-25 MCG/INH inhaler 1 puff, Inhalation, Daily, Rinse mouth after use.    furosemide (LASIX) 20 mg, Oral, Daily    levETIRAcetam (KEPPRA) 250 mg, Oral, 2 times daily    metoprolol succinate (TOPROL-XL) 25 mg 24 hr tablet TAKE 1 TABLET DAILY    pantoprazole (PROTONIX) 40 mg tablet TAKE 1 TABLET DAILY    Polyethylene Glycol 3350 (MIRALAX PO) Oral, As needed    simvastatin (ZOCOR) 40 mg tablet TAKE 1 TABLET DAILY    tamsulosin (FLOMAX) 0.4 mg, Oral, Daily    Allergies   Allergen Reactions    Penicillamine Rash    Penicillins Rash     Rash on hands    Morphine Other (See Comments)     Low blood pressure      Social History     Tobacco Use    Smoking status: Former     Current packs/day: 0.00     Types: Cigars, Cigarettes     Quit date: 1950     Years since quittin.0    Smokeless tobacco: Never    Tobacco comments:     occasional-cigar   Vaping Use    Vaping status: Never Used   Substance Use Topics    Alcohol use: Yes     Alcohol/week: 2.0 standard drinks of alcohol     Types: 1 Glasses of wine, 1 Cans of beer per week     Comment: daily    Drug use: No    Family History   Problem Relation Age of Onset    Cancer Mother     Stroke Father           Objective                            Vitals I/O      Most Recent Min/Max in 24hrs   Temp 98.5 °F (36.9 °C) Temp  Min: 98.4 °F (36.9 °C)  Max: 98.5 °F (36.9 °C)   Pulse 97 Pulse  Min: 88  Max: 102   Resp 22 Resp  Min: 18  Max: 36   BP 96/60 BP  Min: 67/40  Max: 118/58   O2 Sat 94 % SpO2  Min: 91 %  Max: 95 %      Intake/Output Summary (Last 24 hours) at 2024 0051  Last data filed at 2024 2158  Gross per 24 hour   Intake 1050 ml   Output --   Net 1050 ml       No diet orders on file    Invasive Monitoring           Physical Exam   Physical Exam  Eyes:      Pupils: Pupils are equal, round, and reactive to light.   Skin:     General: Skin is warm and dry.   HENT:      Head: Normocephalic and atraumatic.       Mouth/Throat:      Mouth: Mucous membranes are dry.   Cardiovascular:      Rate and Rhythm: Regular rhythm. Tachycardia present.      Pulses: Normal pulses.   Musculoskeletal:         General: No tenderness or signs of injury.      Right lower leg: No edema.      Left lower leg: No edema.   Abdominal:      Palpations: Abdomen is soft.   Constitutional:       Appearance: He is ill-appearing.      Interventions: Nasal cannula in place.   Pulmonary:      Effort: Tachypnea present.      Breath sounds: Normal breath sounds.   Psychiatric:         Behavior: Behavior is cooperative.   Neurological:      Mental Status: He is easily aroused.      GCS: GCS eye subscore is 4. GCS verbal subscore is 5. GCS motor subscore is 6.   Genitourinary/Anorectal:     Comments: Small amount of cloudy yellow urine present  external catheter present.          Diagnostic Studies      EKG: Sinus rhytm  Imaging:  I have personally reviewed pertinent reports.   and I have personally reviewed pertinent films in PACS     Medications:  Scheduled PRN          Continuous    norepinephrine, 1-30 mcg/min, Last Rate: 5 mcg/min (04/16/24 2230)  vasopressin (PITRESSIN) 20 Units in sodium chloride 0.9 % 100 mL infusion, 0.04 Units/min, Last Rate: 0.04 Units/min (04/16/24 2016)         Labs:    CBC    Recent Labs     04/16/24  1122 04/16/24 2043   WBC 13.76* 19.02*   HGB 12.0 10.2*   HCT 35.0* 30.4*    175     BMP    Recent Labs     04/16/24 1918 04/16/24 2043   SODIUM 138 133*   K 3.6 4.7   * 102   CO2 9* 16*   AGAP 10 15*   BUN 17 25   CREATININE 0.99 1.89*   CALCIUM 4.7* 7.7*       Coags    Recent Labs     04/16/24 2030   INR 1.39*   PTT 38*        Additional Electrolytes  No recent results       Blood Gas    No recent results  No recent results LFTs  Recent Labs     04/16/24 1918 04/16/24 2043   ALT 24 156*   AST 27 149*   ALKPHOS 33* 47   ALB 2.0* 3.3*   TBILI 0.80 1.40*       Infectious  Recent Labs     04/16/24 2030    PROCALCITONI 0.30*     Glucose  Recent Labs     04/16/24  1122 04/16/24  1918 04/16/24  2043   GLUC 125 142* 286*             Anticipated Length of Stay is > 2 midnights  JESSICA Yi

## 2024-04-17 NOTE — ED PROVIDER NOTES
History  Chief Complaint   Patient presents with    Hypotension     Pt. Discharged this am with a UTI. Family called EMS when patient displayed ambulatory changed and increased weakness.     89-year-old male presents emergency department evaluation of generalized fatigue.  Patient was discharged earlier from this hospital with antibiotic for UTI.  Patient got generally fatigued, weak, chills, could no longer stand, family called 911 who found to be profoundly hypotensive with cool and mottled skin he arrives [place] the state.  He was feeling well at time of discharge.  Declined throughout the day.        Prior to Admission Medications   Prescriptions Last Dose Informant Patient Reported? Taking?   Polyethylene Glycol 3350 (MIRALAX PO)  Child Yes No   Sig: Take by mouth as needed     amLODIPine (NORVASC) 5 mg tablet  Child No No   Sig: Take 1 tablet (5 mg total) by mouth daily   aspirin 81 mg chewable tablet  Child No No   Sig: Chew 1 tablet (81 mg total) daily   Patient taking differently: Chew 324 mg daily   cefdinir (OMNICEF) 300 mg capsule   No No   Sig: Take 1 capsule (300 mg total) by mouth every 12 (twelve) hours for 7 days   cyanocobalamin (VITAMIN B-12) 500 MCG tablet  Child No No   Sig: Take 1 tablet (500 mcg total) by mouth daily   fexofenadine (ALLEGRA) 60 MG tablet  Child Yes No   Sig: Take 60 mg by mouth daily   fluticasone (FLONASE) 50 mcg/act nasal spray  Child Yes No   Si sprays into each nostril daily   fluticasone-umeclidinium-vilanterol (Trelegy Ellipta) 100-62.5-25 MCG/INH inhaler  Child Yes No   Sig: Inhale 1 puff daily Rinse mouth after use.   furosemide (LASIX) 20 mg tablet  Child No No   Sig: Take 1 tablet (20 mg total) by mouth daily   levETIRAcetam (KEPPRA) 250 mg tablet  Child No No   Sig: Take 1 tablet (250 mg total) by mouth 2 (two) times a day   metoprolol succinate (TOPROL-XL) 25 mg 24 hr tablet  Child No No   Sig: TAKE 1 TABLET DAILY   pantoprazole (PROTONIX) 40 mg tablet  Child No No  "  Sig: TAKE 1 TABLET DAILY   simvastatin (ZOCOR) 40 mg tablet  Child No No   Sig: TAKE 1 TABLET DAILY   tamsulosin (FLOMAX) 0.4 mg  Child Yes No   Sig: Take 0.4 mg by mouth daily      Facility-Administered Medications: None       Past Medical History:   Diagnosis Date    Arthritis     CHF (congestive heart failure) (HCC)     Coronary artery disease     Esophageal reflux     History of transfusion     \"MANY YEARS AGO\"    Hyperlipidemia     Hypertension     Renal disorder     PT DENIES, BUT ON PROBLEM LIST    Stroke (HCC)     NOT SURE       Past Surgical History:   Procedure Laterality Date    ABDOMINAL SURGERY      BACK SURGERY      CARDIAC SURGERY      COLON SURGERY      CORONARY ARTERY BYPASS GRAFT      FRACTURE SURGERY      TONSILLECTOMY      Last Assessed: 2016       Family History   Problem Relation Age of Onset    Cancer Mother     Stroke Father      I have reviewed and agree with the history as documented.    E-Cigarette/Vaping    E-Cigarette Use Never User      E-Cigarette/Vaping Substances    Nicotine No     THC No     CBD No     Flavoring No     Other No     Unknown No      Social History     Tobacco Use    Smoking status: Former     Current packs/day: 0.00     Types: Cigars, Cigarettes     Quit date: 1950     Years since quittin.0    Smokeless tobacco: Never    Tobacco comments:     occasional-cigar   Vaping Use    Vaping status: Never Used   Substance Use Topics    Alcohol use: Yes     Alcohol/week: 2.0 standard drinks of alcohol     Types: 1 Glasses of wine, 1 Cans of beer per week     Comment: daily    Drug use: No       Review of Systems   Constitutional:  Positive for chills and fatigue. Negative for appetite change and fever.   HENT:  Negative for sneezing and sore throat.    Eyes:  Negative for visual disturbance.   Respiratory:  Negative for cough, choking, chest tightness, shortness of breath and wheezing.    Cardiovascular:  Negative for chest pain and palpitations. "   Gastrointestinal:  Negative for abdominal pain, constipation, diarrhea, nausea and vomiting.   Genitourinary:  Negative for difficulty urinating and dysuria.   Neurological:  Negative for dizziness, weakness, light-headedness, numbness and headaches.   All other systems reviewed and are negative.      Physical Exam  Physical Exam  Constitutional:       General: He is not in acute distress.     Appearance: He is well-developed. He is ill-appearing. He is not diaphoretic.   HENT:      Head: Normocephalic and atraumatic.   Eyes:      Pupils: Pupils are equal, round, and reactive to light.   Neck:      Vascular: No JVD.      Trachea: No tracheal deviation.   Cardiovascular:      Rate and Rhythm: Normal rate and regular rhythm.      Heart sounds: Normal heart sounds. No murmur heard.     No friction rub. No gallop.   Pulmonary:      Effort: Pulmonary effort is normal. No respiratory distress.      Breath sounds: Normal breath sounds. No wheezing or rales.   Abdominal:      General: Bowel sounds are normal. There is no distension.      Palpations: Abdomen is soft.      Tenderness: There is no abdominal tenderness. There is no guarding or rebound.   Skin:     General: Skin is warm and dry.      Coloration: Skin is mottled, pale and sallow.   Neurological:      Mental Status: He is alert and oriented to person, place, and time.      Cranial Nerves: No cranial nerve deficit.      Motor: No abnormal muscle tone.   Psychiatric:         Behavior: Behavior normal.         Vital Signs  ED Triage Vitals   Temperature Pulse Respirations Blood Pressure SpO2   04/16/24 2054 04/16/24 1911 04/16/24 1911 04/16/24 1911 04/16/24 1911   98.5 °F (36.9 °C) 97 18 (!) 80/52 92 %      Temp Source Heart Rate Source Patient Position - Orthostatic VS BP Location FiO2 (%)   04/16/24 2054 04/16/24 1911 04/16/24 1911 04/16/24 1911 --   Oral Monitor Lying Left arm       Pain Score       04/17/24 0100       No Pain           Vitals:    04/17/24 0219  04/17/24 0241 04/17/24 0245 04/17/24 0257   BP: (!) 89/58 107/65 (!) 87/60 (!) 87/60   Pulse: 96 94 67 70   Patient Position - Orthostatic VS:             Visual Acuity  Visual Acuity      Flowsheet Row Most Recent Value   L Pupil Size (mm) 3   R Pupil Size (mm) 3   L Pupil Shape Round   R Pupil Shape Round            ED Medications  Medications   vasopressin (PITRESSIN) 20 Units in sodium chloride 0.9 % 100 mL infusion (0.04 Units/min Intravenous New Bag 4/16/24 2016)   NOREPINEPHRINE 4 MG  ML NSS (CMPD ORDER) infusion (15 mcg/min Intravenous New Bag 4/17/24 0246)   cyanocobalamin (VITAMIN B-12) tablet 500 mcg (has no administration in time range)   fluticasone (FLONASE) 50 mcg/act nasal spray 2 spray (has no administration in time range)   Fluticasone Furoate-Vilanterol 100-25 mcg/actuation 1 puff (has no administration in time range)     And   umeclidinium 62.5 mcg/actuation inhaler AEPB 1 puff (has no administration in time range)   levETIRAcetam (KEPPRA) tablet 250 mg (has no administration in time range)   pantoprazole (PROTONIX) EC tablet 40 mg (has no administration in time range)   tamsulosin (FLOMAX) capsule 0.4 mg (has no administration in time range)   chlorhexidine (PERIDEX) 0.12 % oral rinse 15 mL (15 mL Mouth/Throat Given 4/17/24 0126)   multi-electrolyte (PLASMALYTE-A/ISOLYTE-S PH 7.4) IV solution (125 mL/hr Intravenous New Bag 4/17/24 0140)   senna-docusate sodium (SENOKOT S) 8.6-50 mg per tablet 2 tablet (has no administration in time range)   heparin (porcine) subcutaneous injection 5,000 Units (has no administration in time range)   cefTRIAXone (ROCEPHIN) 2,000 mg in dextrose 5 % 50 mL IVPB (has no administration in time range)   hydrocortisone (Solu-CORTEF) injection 50 mg (50 mg Intravenous Given 4/17/24 0126)   ondansetron (FOR EMS ONLY) (ZOFRAN) 4 mg/2 mL injection 4 mg (0 mg Does not apply Given to EMS 4/16/24 1911)   lidocaine (cardiac) (FOR EMS ONLY) 20 mg/mL injection 100 mg ( Does  not apply Given to EMS 4/16/24 1911)   ceftriaxone (ROCEPHIN) 1 g/50 mL in dextrose IVPB (0 mg Intravenous Stopped 4/16/24 2158)   sodium chloride 0.9 % bolus 1,000 mL (0 mL Intravenous Stopped 4/16/24 2045)   sodium chloride 0.9 % bolus 1,000 mL (0 mL Intravenous Stopped 4/16/24 2158)   norepinephrine (LEVOPHED) 1 mg/mL injection **ADS Override Pull** (5 mcg  Given 4/16/24 1940)   multi-electrolyte (ISOLYTE-S PH 7.4) bolus 1,000 mL (1,000 mL Intravenous New Bag 4/17/24 0100)   sodium bicarbonate 8.4 % injection 50 mEq (50 mEq Intravenous Given 4/17/24 0126)       Diagnostic Studies  Results Reviewed       Procedure Component Value Units Date/Time    Blood culture #1 [[id number]] Collected: 04/16/24 2030    Lab Status: Preliminary result Specimen: Blood from Arm, Left Updated: 04/17/24 0003     Blood Culture Received in Microbiology Lab. Culture in Progress.    Blood culture #2 [[id number]] Collected: 04/16/24 2030    Lab Status: Preliminary result Specimen: Blood from Arm, Left Updated: 04/17/24 0003     Blood Culture Received in Microbiology Lab. Culture in Progress.    Lactic acid 2 Hours [[id number]]  (Abnormal) Collected: 04/16/24 2318    Lab Status: Final result Specimen: Blood from Central Venous Line Updated: 04/16/24 2353     LACTIC ACID 6.6 mmol/L     Narrative:      Result may be elevated if tourniquet was used during collection.    Comprehensive metabolic panel [[id number]]  (Abnormal) Collected: 04/16/24 2043    Lab Status: Final result Specimen: Blood from Arm, Left Updated: 04/16/24 2150     Sodium 133 mmol/L      Potassium 4.7 mmol/L      Chloride 102 mmol/L      CO2 16 mmol/L      ANION GAP 15 mmol/L      BUN 25 mg/dL      Creatinine 1.89 mg/dL      Glucose 286 mg/dL      Calcium 7.7 mg/dL      Corrected Calcium 8.3 mg/dL       U/L       U/L      Alkaline Phosphatase 47 U/L      Total Protein 5.8 g/dL      Albumin 3.3 g/dL      Total Bilirubin 1.40 mg/dL      eGFR 29 ml/min/1.73sq m      Narrative:      verified by repeat analysis.  National Kid[employer] guidelines for Chronic Kidney Disease (CKD):     Stage 1 with normal or high GFR (GFR > 90 mL/min/1.73 square meters)    Stage 2 Mild CKD (GFR = 60-89 mL/min/1.73 square meters)    Stage 3A Moderate CKD (GFR = 45-59 mL/min/1.73 square meters)    Stage 3B Moderate CKD (GFR = 30-44 mL/min/1.73 square meters)    Stage 4 Severe CKD (GFR = 15-29 mL/min/1.73 square meters)    Stage 5 End Stage CKD (GFR <15 mL/min/1.73 square meters)  Note: GFR calculation is accurate only with a steady state creatinine    Procalcitonin [[id number]]  (Abnormal) Collected: 04/16/24 2030    Lab Status: Final result Specimen: Blood from Arm, Left Updated: 04/16/24 2112     Procalcitonin 0.30 ng/ml     Urine Microscopic [[id number]]  (Abnormal) Collected: 04/16/24 2049    Lab Status: Final result Specimen: Urine, Other Updated: 04/16/24 2108     RBC, UA Innumerable /hpf      WBC, UA Innumerable /hpf      Epithelial Cells None Seen /hpf      Bacteria, UA Moderate /hpf      WBC Clumps Present    Urine culture [[id number]] Collected: 04/16/24 2049    Lab Status: In process Specimen: Urine, Other Updated: 04/16/24 2108    UA w Reflex to Microscopic w Reflex to Culture [[id number]]  (Abnormal) Collected: 04/16/24 2049    Lab Status: Final result Specimen: Urine, Other Updated: 04/16/24 2104     Color, UA Yellow     Clarity, UA Extra Turbid     Specific Gravity, UA 1.016     pH, UA 6.0     Leukocytes, UA Large     Nitrite, UA Negative     Protein,  (3+) mg/dl      Glucose, UA Negative mg/dl      Ketones, UA Negative mg/dl      Urobilinogen, UA <2.0 mg/dl      Bilirubin, UA Negative     Occult Blood, UA Moderate    Lactic acid [[id number]]  (Abnormal) Collected: 04/16/24 2030    Lab Status: Final result Specimen: Blood from Arm, Left Updated: 04/16/24 2103     LACTIC ACID 3.6 mmol/L     Narrative:      Result may be elevated if tourniquet was used during collection.     Protime-INR [[id number]]  (Abnormal) Collected: 04/16/24 2030    Lab Status: Final result Specimen: Blood from Arm, Left Updated: 04/16/24 2103     Protime 17.8 seconds      INR 1.39    APTT [[id number]]  (Abnormal) Collected: 04/16/24 2030    Lab Status: Final result Specimen: Blood from Arm, Left Updated: 04/16/24 2103     PTT 38 seconds     CBC and differential [[id number]]  (Abnormal) Collected: 04/16/24 2043    Lab Status: Final result Specimen: Blood from Arm, Right Updated: 04/16/24 2054     WBC 19.02 Thousand/uL      RBC 2.97 Million/uL      Hemoglobin 10.2 g/dL      Hematocrit 30.4 %       fL      MCH 34.3 pg      MCHC 33.6 g/dL      RDW 13.7 %      MPV 9.3 fL      Platelets 175 Thousands/uL      nRBC 0 /100 WBCs      Segmented % 87 %      Immature Grans % 1 %      Lymphocytes % 5 %      Monocytes % 7 %      Eosinophils Relative 0 %      Basophils Relative 0 %      Absolute Neutrophils 16.41 Thousands/µL      Absolute Immature Grans 0.23 Thousand/uL      Absolute Lymphocytes 0.97 Thousands/µL      Absolute Monocytes 1.38 Thousand/µL      Eosinophils Absolute 0.01 Thousand/µL      Basophils Absolute 0.02 Thousands/µL     Comprehensive metabolic panel [[id number]]  (Abnormal) Collected: 04/16/24 1918    Lab Status: Final result Specimen: Blood from Arm, Right Updated: 04/16/24 2016     Sodium 138 mmol/L      Potassium 3.6 mmol/L      Chloride 119 mmol/L      CO2 9 mmol/L      ANION GAP 10 mmol/L      BUN 17 mg/dL      Creatinine 0.99 mg/dL      Glucose 142 mg/dL      Calcium 4.7 mg/dL      Corrected Calcium 6.3 mg/dL      AST 27 U/L      ALT 24 U/L      Alkaline Phosphatase 33 U/L      Total Protein 3.3 g/dL      Albumin 2.0 g/dL      Total Bilirubin 0.80 mg/dL      eGFR 64 ml/min/1.73sq m     Narrative:      National Kid[employer] Disease Foundation guidelines for Chronic Kidney Disease (CKD):     Stage 1 with normal or high GFR (GFR > 90 mL/min/1.73 square meters)    Stage 2 Mild CKD (GFR = 60-89 mL/min/1.73  square meters)    Stage 3A Moderate CKD (GFR = 45-59 mL/min/1.73 square meters)    Stage 3B Moderate CKD (GFR = 30-44 mL/min/1.73 square meters)    Stage 4 Severe CKD (GFR = 15-29 mL/min/1.73 square meters)    Stage 5 End Stage CKD (GFR <15 mL/min/1.73 square meters)  Note: GFR calculation is accurate only with a steady state creatinine                   CT chest abdomen pelvis wo contrast   Final Result by Steve oJnes MD (04/17 0054)         1. No acute cardiopulmonary process.   2. Stable exam of the abdomen and pelvis.               Workstation performed: [id number]                    Procedures  Central Line    Date/Time: 4/17/2024 7:05 PM    Performed by: Vipul Hughes MD  Authorized by: Vipul Hughes MD    Patient location:  ED  Consent:     Consent obtained:  Emergent situation    Risks discussed:  Arterial puncture, incorrect placement, infection and bleeding    Alternatives discussed:  No treatment  Universal protocol:     Patient identity confirmed:  Verbally with patient  Pre-procedure details:     Hand hygiene: Hand hygiene performed prior to insertion      Sterile barrier technique: All elements of maximal sterile technique followed      Skin preparation:  2% chlorhexidine    Skin preparation agent: Skin preparation agent completely dried prior to procedure    Indications:     Central line indications: medications requiring central line and no peripheral vascular access    Anesthesia (see MAR for exact dosages):     Anesthesia method:  Local infiltration    Local anesthetic:  Lidocaine 1% w/o epi  Procedure details:     Location:  Left femoral    Vessel type: vein      Laterality:  Left    Approach: percutaneous technique used      Patient position:  Flat    Catheter type:  Triple lumen    Catheter size:  7 Fr    Landmarks identified: yes      Ultrasound guidance: yes      Ultrasound image availability:  Not obtained due to urgency    Sterile ultrasound techniques: Sterile gel and sterile probe  covers were used      Number of attempts:  2    Successful placement: yes      Catheter tip vessel location: iliac vein    Post-procedure details:     Post-procedure:  Dressing applied    Assessment:  Free fluid flow and blood return through all ports    Post-procedure complications: none      Patient tolerance of procedure:  Tolerated well, no immediate complications  CriticalCare Time    Date/Time: 4/17/2024 3:05 AM    Performed by: Vipul Hughes MD  Authorized by: Vipul Hughes MD    Critical care provider statement:     Critical care time (minutes):  120    Critical care start time:  4/16/2024 7:00 PM    Critical care end time:  4/16/2024 9:00 PM    Critical care time was exclusive of:  Separately billable procedures and treating other patients    Critical care was necessary to treat or prevent imminent or life-threatening deterioration of the following conditions:  Cardiac failure, circulatory failure, CNS failure or compromise, dehydration, endocrine crisis, hepatic failure and metabolic crisis    Critical care was time spent personally by me on the following activities:  Blood draw for specimens, obtaining history from patient or surrogate, development of treatment plan with patient or surrogate, discussions with consultants, evaluation of patient's response to treatment, examination of patient, review of old charts, re-evaluation of patient's condition, ordering and review of radiographic studies, ordering and review of laboratory studies, ordering and performing treatments and interventions and interpretation of cardiac output measurements           ED Course                                             Medical Decision Making  89-year-old male presenting with hypotension.  This point have high index of suspicion for interval development of septic shock since previous visit.  IV access was obtained as well as central venous access, patient initiated on fluids, vasoactive medicines, broad-spectrum antibiotics,  will complete labs urinalysis and imaging.  Patient will likely require ICU level of care.    Amount and/or Complexity of Data Reviewed  Labs: ordered.  Radiology: ordered.    Risk  Decision regarding hospitalization.             Disposition  Final diagnoses:   UTI (urinary tract infection)     Time reflects when diagnosis was documented in both MDM as applicable and the Disposition within this note       Time User Action Codes Description Comment    4/17/2024 12:21 AM Vipul Hughes Add [N39.0] UTI (urinary tract infection)     4/17/2024 12:50 AM Gael Kennedy Add [A41.9,  R65.21] Septic shock (HCC)           ED Disposition       ED Disposition   Admit    Condition   Stable    Date/Time   Wed Apr 17, 2024 12:21 AM    Comment   Case was discussed with ICU and the patient's admission status was agreed to be Admission Status: inpatient status to the service of Dr. Gao .               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)      aspirin 81 mg chewable tablet Chew 1 tablet (81 mg total) daily  Qty: 90 tablet, Refills: 3    Associated Diagnoses: Coronary artery disease involving native coronary artery of native heart without angina pectoris      cefdinir (OMNICEF) 300 mg capsule Take 1 capsule (300 mg total) by mouth every 12 (twelve) hours for 7 days  Qty: 13 capsule, Refills: 0    Comments: First dose given in ED  Associated Diagnoses: Urinary tract infection      cyanocobalamin (VITAMIN B-12) 500 MCG tablet Take 1 tablet (500 mcg total) by mouth daily  Qty: 90 tablet, Refills: 3    Associated Diagnoses: Vitamin B12 deficiency      fexofenadine (ALLEGRA) 60 MG  tablet Take 60 mg by mouth daily      fluticasone (FLONASE) 50 mcg/act nasal spray 2 sprays into each nostril daily      fluticasone-umeclidinium-vilanterol (Trelegy Ellipta) 100-62.5-25 MCG/INH inhaler Inhale 1 puff daily Rinse mouth after use.      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)      levETIRAcetam (KEPPRA) 250 mg tablet Take 1 tablet (250 mg total) by mouth 2 (two) times a day  Qty: 180 tablet, Refills: 3    Associated Diagnoses: Seizure (HCC)      metoprolol succinate (TOPROL-XL) 25 mg 24 hr tablet TAKE 1 TABLET DAILY  Qty: 90 tablet, Refills: 3    Associated Diagnoses: Essential hypertension      pantoprazole (PROTONIX) 40 mg tablet TAKE 1 TABLET DAILY  Qty: 90 tablet, Refills: 3    Associated Diagnoses: Chronic cough      Polyethylene Glycol 3350 (MIRALAX PO) Take by mouth as needed        simvastatin (ZOCOR) 40 mg tablet TAKE 1 TABLET DAILY  Qty: 90 tablet, Refills: 3    Associated Diagnoses: Mixed hyperlipidemia      tamsulosin (FLOMAX) 0.4 mg Take 0.4 mg by mouth daily             No discharge procedures on file.    PDMP Review         Value Time User    PDMP Reviewed  Yes 1/4/2022  4:47 AM Sofia Richardson PA-C            ED Provider  Electronically Signed by             Vipul Hughes MD  04/17/24 5094

## 2024-04-17 NOTE — ASSESSMENT & PLAN NOTE
Wt Readings from Last 3 Encounters:   04/17/24 77.6 kg (171 lb)   04/09/24 78.8 kg (173 lb 12.8 oz)   12/19/23 80.3 kg (177 lb)     Continue milrinone  Trend VBG  Daily weights  ECHO completed and grossly stable on pressors/milrinone  Consider diuretics

## 2024-04-17 NOTE — ASSESSMENT & PLAN NOTE
Wt Readings from Last 3 Encounters:   04/09/24 78.8 kg (173 lb 12.8 oz)   12/19/23 80.3 kg (177 lb)   09/27/23 78 kg (172 lb)     Hold home metoprolol and Lasix  Daily weights  Consider ECHO if pressor requirements persistent

## 2024-04-17 NOTE — ASSESSMENT & PLAN NOTE
Suspect secondary to urinary source  Follow results of CT A/P  Day 2 antibiotics- currently on ceftriaxone/ampicillin/vancomycin/Flagyl  2/2 blood cultures positive for enterococcus  Pending ID consult  Follow temperature, white count, culture results  Continue vasopressors for MAP>65  Continue stress dose steroids

## 2024-04-17 NOTE — RESPIRATORY THERAPY NOTE
RT Ventilator Management Note  Arjun Huff 89 y.o. male MRN: [id number]  Unit/Bed#: ICU 12 Encounter: [id number]      Daily Screen    No data found in the last 10 encounters.           Physical Exam:   Assessment Type: Assess only  General Appearance: Sleeping  Respiratory Pattern: Assisted, Spontaneous  Chest Assessment: Chest expansion symmetrical  Bilateral Breath Sounds: Diminished, Rales  O2 Device: v60      Resp Comments: no changes made at this time       04/17/24 1444   Respiratory Assessment   Assessment Type Assess only   General Appearance Sleeping   Respiratory Pattern Assisted;Spontaneous   Chest Assessment Chest expansion symmetrical   Bilateral Breath Sounds Diminished;Rales   Resp Comments no changes made at this time   O2 Device v60   Non-Invasive Information   O2 Interface Device Face mask   Non-Invasive Ventilation Mode BiPAP   SpO2 99 %   $ Pulse Oximetry Spot Check Charge Completed   Non-Invasive Settings   IPAP (cm) 14 cm   EPAP (cm) 6 cm   Rate (Set) 8   FiO2 (%) 40   Pressure Support (cm H2O) 8   Rise Time 3   Trigger Sensitivity Flow (lpm) 2   Inspiratory Time (Set) 1   Temperature (Set) 31   Non-Invasive Readings   Skin Intervention Skin intact   Total Rate 26   MV (Mech) 19.1   Peak Pressure (Obs) 15   Spontaneous Vt (mL) 687   Heater Temperature (Obs) 30.8   Leak (lpm) 0   Non-Invasive Alarms   Insp Pressure High (cm H20) 25   Insp Pressure Low (cm H20) 4   Low Insp Pressure Time (sec) 20 sec   MV Low (L/min) 3   Vt High (mL) 1200   Vt Low (mL) 200   High Resp Rate (BPM) 45 BPM   Low Resp Rate (BPM) 2 BPM   Maintenance   Water bag changed No

## 2024-04-17 NOTE — PROGRESS NOTES
[facility]  Interval Progress Note: Critical Care  Name: Arjun Huff I  MRN: [id number]  Unit/Bed#: ICU 12 I Date of Admission: 4/16/2024   Date of Service: 4/17/2024 I Hospital Day: 0    Interval Events:        Patient with significantly worsening metabolic acidosis, oliguric bordering on anuric, and complaining of subjective dyspnea. Patient given 2 pushes of sodium bicarbonate, additional 1L IVF, started on sodium bicarbonate infusion, esposito catheter placed, and bipap started.     Pertinent New Data:   /46  RR 31 SpO2 95% on 6L NC    Physical Exam  Eyes:      Pupils: Pupils are equal, round, and reactive to light.   Skin:     General: Skin is cool and dry.   HENT:      Head: Normocephalic and atraumatic.      Mouth/Throat:      Mouth: Mucous membranes are dry.   Cardiovascular:      Rate and Rhythm: Regular rhythm. Tachycardia present.      Pulses: Normal pulses.   Musculoskeletal:         General: No tenderness or signs of injury.      Right lower leg: Trace Edema present.      Left lower leg: Trace Edema present.   Abdominal: General: There is distension.     Palpations: Abdomen is soft.      Tenderness: There is abdominal tenderness (suprapubic).   Constitutional:       General: He is in acute distress.      Appearance: He is ill-appearing.      Interventions: Face mask in place.   Pulmonary:      Effort: Tachypnea present.      Breath sounds: Rhonchi present.   Neurological:      GCS: GCS eye subscore is 4. GCS verbal subscore is 5. GCS motor subscore is 6.      Sensory: Sensation is intact.   Genitourinary/Anorectal:     Comments: Cream colored urine in esposito  Esposito present.       I have personally reviewed pertinent lab results.  abdominal/pelvic CTI have personally reviewed pertinent reports.   and I have personally reviewed pertinent films in PACS      Assessment and Plan  Diagnosis: Septic shock, acute kidney injury, high anion gap metabolic acidosis,  transaminitis, cardiomyopathy  Plan: Continue vasopressors, additional volume resuscitation, sodium bicarbonate, serial labs, given low ScVO2 could consider milrinone however patient on significant vasopressor support, will order RUQ US and ECHO    Billing Level:  Critical Care Time Statement: Upon my evaluation, this patient had a high probability of imminent or life-threatening deterioration due to worsening shock with end organ dysfunction which required my direct attention, intervention, and personal management.  I spent a total of 45 minutes directly providing critical care services, including interpretation of complex medical databases, evaluating for the presence of life-threatening injuries or illnesses, management of organ system failure(s) , complex medical decision making (to support/prevent further life-threatening deterioration)., interpretation of hemodynamic data, titration of vasoactive medications, and titration of continuous IV medications (drips). This time is exclusive of procedures, teaching, family meetings, and any prior time recorded by providers other than myself.      SIGNATURE: JESSICA Yi

## 2024-04-17 NOTE — RESPIRATORY THERAPY NOTE
04/17/24 0352   Respiratory Assessment   Assessment Type Assess only   General Appearance Awake   Respiratory Pattern Assisted   Chest Assessment Chest expansion symmetrical   Bilateral Breath Sounds Diminished;Rales   Resp Comments patient with increased work of breathing placed on BiPAP at this time   O2 Device V60   Non-Invasive Information   O2 Interface Device Face mask   Non-Invasive Ventilation Mode BiPAP   $ Continous NIV Initial   $ Pulse Oximetry Spot Check Charge Completed   Non-Invasive Settings   IPAP (cm) 14 cm   EPAP (cm) 6 cm   Rate (Set) 8   FiO2 (%) 44   Pressure Support (cm H2O) 8   Rise Time 3   Trigger Sensitivity Flow (lpm) 2   Inspiratory Time (Set) 1   Humidification   (heater)   Temperature (Set) 31   Non-Invasive Readings   Skin Intervention Skin intact   Total Rate 27   MV (Mech) 21.3   Peak Pressure (Obs) 15   Spontaneous Vt (mL) 783   Heater Temperature (Obs) 31   Leak (lpm) 0   Non-Invasive Alarms   Insp Pressure High (cm H20) 25   Insp Pressure Low (cm H20) 4   Low Insp Pressure Time (sec) 20 sec   MV Low (L/min) 3   Vt High (mL) 1200   Vt Low (mL) 200   High Resp Rate (BPM) 45 BPM   Low Resp Rate (BPM) 8 BPM

## 2024-04-17 NOTE — RESPIRATORY THERAPY NOTE
04/17/24 1127   Respiratory Assessment   Assessment Type Assess only   General Appearance Sleeping   Respiratory Pattern Assisted   Chest Assessment Chest expansion symmetrical   Resp Comments remains on Bipap, asleep, no changes at this time, spo2 98%   O2 Device v60   Non-Invasive Information   Non-Invasive Ventilation Mode BiPAP   SpO2 99 %   $ Pulse Oximetry Spot Check Charge Completed   Non-Invasive Settings   IPAP (cm) 14 cm   EPAP (cm) 6 cm   Rate (Set) 8   FiO2 (%) 40   Pressure Support (cm H2O) 8   Rise Time 3   Inspiratory Time (Set) 1   Temperature (Set) 31   Non-Invasive Readings   Skin Intervention Skin intact   Total Rate 21   MV (Mech) 12   Peak Pressure (Obs) 15   Spontaneous Vt (mL) 635   Heater Temperature (Obs) 31   Leak (lpm) 0   Non-Invasive Alarms   Insp Pressure High (cm H20) 25   Insp Pressure Low (cm H20) 4   Low Insp Pressure Time (sec) 20 sec   MV Low (L/min) 3   Vt High (mL) 1200   Vt Low (mL) 200   High Resp Rate (BPM) 45 BPM   Low Resp Rate (BPM) 2 BPM

## 2024-04-17 NOTE — PLAN OF CARE
Problem: Prexisting or High Potential for Compromised Skin Integrity  Goal: Skin integrity is maintained or improved  Description: INTERVENTIONS:  - Identify patients at risk for skin breakdown  - Assess and monitor skin integrity  - Assess and monitor nutrition and hydration status  - Monitor labs   - Assess for incontinence   - Turn and reposition patient  - Assist with mobility/ambulation  - Relieve pressure over bony prominences  - Avoid friction and shearing  - Provide appropriate hygiene as needed including keeping skin clean and dry  - Evaluate need for skin moisturizer/barrier cream  - Collaborate with interdisciplinary team   - Patient/family teaching  - Consider wound care consult   Outcome: Progressing     Problem: PAIN - ADULT  Goal: Verbalizes/displays adequate comfort level or baseline comfort level  Description: Interventions:  - Encourage patient to monitor pain and request assistance  - Assess pain using appropriate pain scale  - Administer analgesics based on type and severity of pain and evaluate response  - Implement non-pharmacological measures as appropriate and evaluate response  - Consider cultural and social influences on pain and pain management  - Notify physician/advanced practitioner if interventions unsuccessful or patient reports new pain  Outcome: Progressing     Problem: INFECTION - ADULT  Goal: Absence or prevention of progression during hospitalization  Description: INTERVENTIONS:  - Assess and monitor for signs and symptoms of infection  - Monitor lab/diagnostic results  - Monitor all insertion sites, i.e. indwelling lines, tubes, and drains  - Monitor endotracheal if appropriate and nasal secretions for changes in amount and color  - Penelope appropriate cooling/warming therapies per order  - Administer medications as ordered  - Instruct and encourage patient and family to use good hand hygiene technique  - Identify and instruct in appropriate isolation precautions for  identified infection/condition  Outcome: Progressing     Problem: SAFETY ADULT  Goal: Patient will remain free of falls  Description: INTERVENTIONS:  - Educate patient/family on patient safety including physical limitations  - Instruct patient to call for assistance with activity   - Consult OT/PT to assist with strengthening/mobility   - Keep Call bell within reach  - Keep bed low and locked with side rails adjusted as appropriate  - Keep care items and personal belongings within reach  - Initiate and maintain comfort rounds  - Make Fall Risk Sign visible to staff  - Offer Toileting every 2 Hours, in advance of need  - Initiate/Maintain bed alarm  - Obtain necessary fall risk management equipment:   - Apply yellow socks and bracelet for high fall risk patients  - Consider moving patient to room near nurses station  Outcome: Progressing  Goal: Maintain or return to baseline ADL function  Description: INTERVENTIONS:  -  Assess patient's ability to carry out ADLs; assess patient's baseline for ADL function and identify physical deficits which impact ability to perform ADLs (bathing, care of mouth/teeth, toileting, grooming, dressing, etc.)  - Assess/evaluate cause of self-care deficits   - Assess range of motion  - Assess patient's mobility; develop plan if impaired  - Assess patient's need for assistive devices and provide as appropriate  - Encourage maximum independence but intervene and supervise when necessary  - Involve family in performance of ADLs  - Assess for home care needs following discharge   - Consider OT consult to assist with ADL evaluation and planning for discharge  - Provide patient education as appropriate  Outcome: Progressing  Goal: Maintains/Returns to pre admission functional level  Description: INTERVENTIONS:  - Perform AM-PAC 6 Click Basic Mobility/ Daily Activity assessment daily.  - Set and communicate daily mobility goal to care team and patient/family/caregiver.   - Collaborate with  rehabilitation services on mobility goals if consulted  - Perform Range of Motion 3 times a day.  - Reposition patient every 2 hours.  - Dangle patient 1 times a day  - Stand patient 6 times a day  - Ambulate patient 3 times a day  - Out of bed to chair 1 times a day   - Out of bed for meals 2 times a day  - Out of bed for toileting  - Record patient progress and toleration of activity level   Outcome: Progressing     Problem: DISCHARGE PLANNING  Goal: Discharge to home or other facility with appropriate resources  Description: INTERVENTIONS:  - Identify barriers to discharge w/patient and caregiver  - Arrange for needed discharge resources and transportation as appropriate  - Identify discharge learning needs (meds, wound care, etc.)  - Arrange for interpretive services to assist at discharge as needed  - Refer to Case Management Department for coordinating discharge planning if the patient needs post-hospital services based on physician/advanced practitioner order or complex needs related to functional status, cognitive ability, or social support system  Outcome: Progressing     Problem: Knowledge Deficit  Goal: Patient/family/caregiver demonstrates understanding of disease process, treatment plan, medications, and discharge instructions  Description: Complete learning assessment and assess knowledge base.  Interventions:  - Provide teaching at level of understanding  - Provide teaching via preferred learning methods  Outcome: Progressing     Problem: CARDIOVASCULAR - ADULT  Goal: Maintains optimal cardiac output and hemodynamic stability  Description: INTERVENTIONS:  - Monitor I/O, vital signs and rhythm  - Monitor for S/S and trends of decreased cardiac output  - Administer and titrate ordered vasoactive medications to optimize hemodynamic stability  - Assess quality of pulses, skin color and temperature  - Assess for signs of decreased coronary artery perfusion  - Instruct patient to report change in severity  of symptoms  Outcome: Progressing  Goal: Absence of cardiac dysrhythmias or at baseline rhythm  Description: INTERVENTIONS:  - Continuous cardiac monitoring, vital signs, obtain 12 lead EKG if ordered  - Administer antiarrhythmic and heart rate control medications as ordered  - Monitor electrolytes and administer replacement therapy as ordered  Outcome: Progressing     Problem: RESPIRATORY - ADULT  Goal: Achieves optimal ventilation and oxygenation  Description: INTERVENTIONS:  - Assess for changes in respiratory status  - Assess for changes in mentation and behavior  - Position to facilitate oxygenation and minimize respiratory effort  - Oxygen administered by appropriate delivery if ordered  - Initiate smoking cessation education as indicated  - Encourage broncho-pulmonary hygiene including cough, deep breathe, Incentive Spirometry  - Assess the need for suctioning and aspirate as needed  - Assess and instruct to report SOB or any respiratory difficulty  - Respiratory Therapy support as indicated  Outcome: Progressing     Problem: GENITOURINARY - ADULT  Goal: Maintains or returns to baseline urinary function  Description: INTERVENTIONS:  - Assess urinary function  - Encourage oral fluids to ensure adequate hydration if ordered  - Administer IV fluids as ordered to ensure adequate hydration  - Administer ordered medications as needed  - Offer frequent toileting  - Follow urinary retention protocol if ordered  Outcome: Progressing  Goal: Absence of urinary retention  Description: INTERVENTIONS:  - Assess patient’s ability to void and empty bladder  - Monitor I/O  - Bladder scan as needed  - Discuss with physician/AP medications to alleviate retention as needed  - Discuss catheterization for long term situations as appropriate  Outcome: Progressing  Goal: Urinary catheter remains patent  Description: INTERVENTIONS:  - Assess patency of urinary catheter  - If patient has a chronic esposito, consider changing catheter if  non-functioning  - Follow guidelines for intermittent irrigation of non-functioning urinary catheter  Outcome: Progressing     Problem: METABOLIC, FLUID AND ELECTROLYTES - ADULT  Goal: Electrolytes maintained within normal limits  Description: INTERVENTIONS:  - Monitor labs and assess patient for signs and symptoms of electrolyte imbalances  - Administer electrolyte replacement as ordered  - Monitor response to electrolyte replacements, including repeat lab results as appropriate  - Instruct patient on fluid and nutrition as appropriate  Outcome: Progressing  Goal: Fluid balance maintained  Description: INTERVENTIONS:  - Monitor labs   - Monitor I/O and WT  - Instruct patient on fluid and nutrition as appropriate  - Assess for signs & symptoms of volume excess or deficit  Outcome: Progressing  Goal: Glucose maintained within target range  Description: INTERVENTIONS:  - Monitor Blood Glucose as ordered  - Assess for signs and symptoms of hyperglycemia and hypoglycemia  - Administer ordered medications to maintain glucose within target range  - Assess nutritional intake and initiate nutrition service referral as needed  Outcome: Progressing

## 2024-04-17 NOTE — ASSESSMENT & PLAN NOTE
Suspect secondary to urinary source  Follow results of CT A/P  Day 1 ceftriaxone  Follow temperature, white count, culture results  Continue vasopressors for MAP>65  Will order stress dose steroids

## 2024-04-18 NOTE — DEATH NOTE
INPATIENT DEATH NOTE  Arjun Huff 89 y.o. male MRN: [id number]  Unit/Bed#: ICU 12 Encounter: [id number]    Date, Time and Cause of Death    Date of Death: 24  Time of Death: 12:50 AM  Preliminary Cause of Death: Septic shock (HCC)  Entered by: Gael Kennedy[PG1.1]       Attribution       PG1.1 JESSICA Yi 24 00:54             Patient's Information  Pronounced by: Gael LOPEZ  Did the patient's death occur in the ED?: No  Did the patient's death occur in the OR?: No  Did the patient's death occur less than 10 days post-op?: No  Did the patient's death occur within 24 hours of admission?: Yes  Was code status DNR at the time of death?: Yes    PHYSICAL EXAM:  Unresponsive to noxious stimuli, Spontaneous respirations absent, Breath sounds absent, and Heart sounds absent    Medical Examiner notification criteria:  Patient  within 24 hours of arrival to hospital   Medical Examiner's office notified?:  Yes   Medical Examiner accepted case?:  Pending  Name of Medical Examiner: Pending    Family Notification  Was the family notified?: Yes  Date Notified: 24  Time Notified: 0050  Notified by: Gael Kennedy  Name of Family Notified of Death: Sagrario Kenyon   Relationship to Patient: Daughter  Family Notification Route: At bedside    Autopsy Options:  Decision for post-mortem examination not yet made by next of kin.    Primary Service Attending Physician notified?:  yes - Attending:  Homero Gao,     Physician/Resident responsible for completing Discharge Summary:  Gael LOPEZ

## 2024-04-18 NOTE — CASE MANAGEMENT
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    Pulmonary emphysema, unspecified emphysema type (HCC) 2022    Mitral regurgitation 2022    Cardiomyopathy ([facility]) 2022    Acute on chronic systolic heart failure (HCC) 2022    Stage 3b chronic kidney disease (HCC) 2021    Seizure ([facility]) 2020    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 ([facility]) 2020    CAD (coronary artery disease) 2016    Actinic keratosis 2016    Hypertension 2016    Hyperlipidemia 2016    BPH with obstruction/lower urinary tract symptoms 2016      LOS (days): 1  Geometric Mean LOS (GMLOS) (days): 5.1  Days to GMLOS:4     OBJECTIVE:    Risk of Unplanned Readmission Score: 28.03         Current admission status: Inpatient       Preferred Pharmacy:   [id number] [place] [facility] [address]  [address]  [facility] [address]  Phone: [phone] Fax: [phone]    [facility]  [employer] - [place], [place] - [address]  [address] [place] [address]  Phone: [phone] Fax: [phone]    [employer] [facility] - [place], [place] - [address]  [address] [address]  Phone: [phone] Fax: [phone]    Primary Care Provider: Ismael Riggs DO    Primary Insurance: MEDICARE  Secondary Insurance: COMMERCIAL  MISCELLANEOUS    ASSESSMENT:  LATE NOTE FOR 4/17/24    Active Health Care Proxies       Sagrario Kenyon Health Care Agent - Daughter   Primary Phone: [phone] (Home)  Mobile Phone: [phone]                 Advance Directives  Primary Contact: Sagrario Kenyon (Daughter)  [phone] (Home Phone)         Readmission Root Cause  30 Day Readmission: No    Patient Information  Admitted from:: Home  Mental Status: Alert  During Assessment patient was accompanied by: Son, Other-Comment (daughter in law)  Assessment information provided by:: Son (daughter in law)  Primary Caregiver: Self  Support Systems: Son, Daughter, Family members  County of Residence: [place]  What city do you live in?: [place], [place]  Home entry access options. Select all that apply.: No steps to enter home  Type of Current Residence: 2 story home  Upon entering residence, is there a bedroom on the main floor (no further steps)?: No  A bedroom is located on the following floor levels of residence (select all that apply):: 2nd Floor  Upon entering residence, is there a bathroom on the main floor (no further steps)?: No  Indicate which floors of current residence have a bathroom (select all the apply):: 2nd Floor  Number of steps to 2nd floor from main floor:  (16 stairs)  Living Arrangements: Lives w/ Daughter  Is patient a ?: Yes  Is patient active with [facility] ( [facility])?: No    Activities of Daily Living Prior to Admission  Functional Status: Independent  Completes ADLs independently?: Yes  Ambulates independently?: Yes  Does patient use assisted devices?: Yes  Assisted Devices (DME) used: Walker, Other (Comment), Shower Chair, Straight Cane (raised toilet seat)  Does patient currently own DME?: Yes  What DME does the patient currently own?: Other (Comment), Straight Cane, Walker, Shower Chair (raised toilet seat)  Does patient have a history of Outpatient Therapy (PT/OT)?: No  Does the patient have a history of Short-Term  Rehab?: No  Does patient have a history of HHC?: Yes  Does patient currently have HHC?: No         Patient Information Continued  Income Source: SSI/SSD  Does patient have prescription coverage?: Yes  Does patient receive dialysis treatments?: No  Does patient have a history of substance abuse?: No  Does patient have a history of Mental Health Diagnosis?: No         Means of Transportation  Means of Transport to [facility]:: Family transport      Social Determinants of Health (SDOH)      Flowsheet Row Most Recent Value   Housing Stability    In the last 12 months, was there a time when you were not able to pay the mortgage or rent on time? N   In the last 12 months, how many places have you lived? 1   In the last 12 months, was there a time when you did not have a steady place to sleep or slept in a shelter (including now)? N   Transportation Needs    In the past 12 months, has lack of transportation kept you from medical appointments or from getting medications? no   In the past 12 months, has lack of transportation kept you from meetings, work, or from getting things needed for daily living? No   Food Insecurity    Within the past 12 months, you worried that your food would run out before you got the money to buy more. Never true   Within the past 12 months, the food you bought just didn't last and you didn't have money to get more. Never true   Utilities    In the past 12 months has the electric, gas, oil, or water company threatened to shut off services in your home? No            DISCHARGE DETAILS:    Discharge planning discussed with:: pt, son, daughter in law  Freedom of Choice: Yes  Comments - Freedom of Choice: Met w pt and family, introduced self and explained role of CM, including arranging DME, STR, home health, out pt tx.  Advised pt/family that CM will make appropriate referrals based on treatment team recommendations and MD orders, and they can consider recommended plan of care and choose from available  vendors.  CM contacted family/caregiver?: Yes  Were Treatment Team discharge recommendations reviewed with patient/caregiver?:  (none at present)          Contacts  Patient Contacts: Hieu Kenyon Son [phone]  Relationship to Patient:: Family  Contact Method: In Person  Reason/Outcome: Continuity of Care, Discharge Planning         DME Referral Provided  Referral made for DME?: No    Other Referral/Resources/Interventions Provided:  Referral Comments: Pt lives w daughter and her family;  pt reports someone is usually home w him.  In addition, family private hires a home health aide 4-5 hrs per day, 5 days per week.  Pt/family open to home health, hesitant re: STR/SNF.  For PT/OT eval once more medically stable.         Treatment Team Recommendation: Other (TBD)  Discharge Destination Plan:: Other (TBD)  Transport at Discharge : Other (Comment) (TBD, pending destination)

## 2024-04-18 NOTE — RESPIRATORY THERAPY NOTE
"RT Ventilator Management Note  Arjun Huff 89 y.o. male MRN: [id number]  Unit/Bed#: ICU 12 Encounter: [id number]      Daily Screen    No data found in the last 10 encounters.           Physical Exam:   Assessment Type: Assess only  General Appearance: Alert, Awake  Respiratory Pattern: Spontaneous, Assisted  Chest Assessment: Chest expansion symmetrical  Bilateral Breath Sounds: Clear, Diminished, Scattered, Crackles  Cough: None  O2 Device: v60 \"[id number]\"  Subjective Data: awake and alert      Resp Comments: patient remaisn on Bipap via amsk s/p being admitted ror septic shock requiring positive prtessure assistance to mainatain adeqauet ventilationa dn oxygenation pateitn awake and alert wihtout apparent respiratory distress plan: contineu curre t support unitl further orders    "

## 2024-04-18 NOTE — DISCHARGE SUMMARY
Discharge Summary - Arjun Huff 89 y.o. male MRN: [id number]    Unit/Bed#: ICU 12 Encounter: [id number] PCP: Ismael Riggs DO    Admission Date:   Admission Orders (From admission, onward)       Ordered        04/17/24 0048  INPATIENT ADMISSION  Once                            Admitting Diagnosis: UTI (urinary tract infection) [N39.0]  Hypotension [I95.9]  Septic shock (HCC) [A41.9, R65.21]    HPI: Patient is a 89 year old male presenting on 4/17 with a complaint of worsening malaise. He has a past medical history of benign prostate hyperplasia, coronary artery disease, heart failure, hypertension, and chronic kidney disease stage 3. He presented on 4/16 with a complaint of worsening urinary symptoms and was started on oral antibiotics. On arrival on 4/17 he was hypotensive despite volume resuscitation and required vasopressor support. His work-up was notable for a likely urinary tract infection and acute kidney injury. He was referred to the critical care unit for admission.    Procedures Performed:   Orders Placed This Encounter   Procedures    Central Line    Critical Care       Summary of Hospital Course: He was started on broad spectrum antibiotics. Despite volume resuscitation and pressor support, the patient's renal indices worsened and he was oliguric. Due to his history of cardiomyopathy, milrinone was initiated to assist with perfusion and a sodium bicarbonate infusion started. The patient was placed on non-invasive positive pressure and had moderate improvement in his metabolic acidosis. The patient continued to have very low urine output. Overnight from 4/17 into 4/18 the patient had recurrent arrhythmias with significantly increased vasopressor requirements. His family was contacted and presented to bedside at which point the patient was transitioned to comfort-focused care and allowed to experience a natural death. Condolences were offered to the family.    Significant Findings, Care, Treatment  and Services Provided:    ECHO- ejection fraction 35% with severely reduced systolic function with global hypokinesis and mildly abnormal relaxation, mildly dilated right ventricle with mildly reduced systolic function, left atrium dilated   Right upper quadrant ultrasound- thickened gallbladder wall with cholelithiasis    CT Chest/abdomen/pelvis- no acute cardiopulmonary process, stable exam of the abdomen and pelvis   Left femoral central line    Complications: None    Disposition:      Final Diagnosis:   Septic shock  Gram negative urinary tract infection  Enterococcus bacteremia  Acute on chronic kidney injury  Acute on chronic systolic heart failure  High anion gap metabolic acidosis  Lactic acidosis  Transaminitis  Acute hypoxic respiratory failure  Pulmonary emphysema  History of seizure disorder    Medical Problems       Resolved Problems  Date Reviewed: 2024   None         Condition at Time of Death: Peaceful, family at bedside    Date, Time and Cause of Death    Date of Death: 24  Time of Death: 12:50 AM  Preliminary Cause of Death: Septic shock (HCC)  Entered by: Gael Kenndey[PG1.1]       Attribution       PG1.1 JESSICA Yi 24 00:54            Death Note:    INPATIENT DEATH NOTE  Arjun Huff 89 y.o. male MRN: [id number]  Unit/Bed#: ICU 12 Encounter: [id number]    Date, Time and Cause of Death    Date of Death: 24  Time of Death: 12:50 AM  Preliminary Cause of Death: Septic shock (HCC)  Entered by: Gael Kennedy[PG1.1]       Attribution       PG1.1 JESSICA Yi 24 00:54             Patient's Information  Pronounced by: Gael LOPEZ  Did the patient's death occur in the ED?: No  Did the patient's death occur in the OR?: No  Did the patient's death occur less than 10 days post-op?: No  Did the patient's death occur within 24 hours of admission?: Yes  Was code status DNR at the time of death?: Yes    PHYSICAL  EXAM:  Unresponsive to noxious stimuli, Spontaneous respirations absent, Breath sounds absent, and Heart sounds absent    Medical Examiner notification criteria:  Patient  within 24 hours of arrival to hospital   Medical Examiner's office notified?:  Yes   Medical Examiner accepted case?:  Pending  Name of Medical Examiner: Pending    Family Notification  Was the family notified?: Yes  Date Notified: 24  Time Notified: 50  Notified by: Gael Kennedy  Name of Family Notified of Death: Sagrario Kenyon   Relationship to Patient: Daughter  Family Notification Route: At bedside    Autopsy Options:  Decision for post-mortem examination not yet made by next of kin.    Primary Service Attending Physician notified?:  yes - Attending:  Homero Gao,     Physician/Resident responsible for completing Discharge Summary:  Gael LOPEZ

## 2024-04-19 LAB
BACTERIA BLD CULT: ABNORMAL
BACTERIA BLD CULT: ABNORMAL
BACTERIA UR CULT: ABNORMAL
E FAECALIS DNA BLD POS QL NAA+NON-PROBE: DETECTED
GRAM STN SPEC: ABNORMAL
GRAM STN SPEC: ABNORMAL

## 2024-05-01 NOTE — ED NOTES
The time documented for collection in the lab results is an error and the actual time was 2020.     Lulu Layton RN  04/30/24 0668

## 2024-05-02 NOTE — ED NOTES
The time documented for collection of blood cultures in the lab results is an error and the actual time was 2020.        Lulu Layton RN  05/01/24 3173